# Patient Record
Sex: MALE | Race: WHITE | NOT HISPANIC OR LATINO | Employment: OTHER | URBAN - METROPOLITAN AREA
[De-identification: names, ages, dates, MRNs, and addresses within clinical notes are randomized per-mention and may not be internally consistent; named-entity substitution may affect disease eponyms.]

---

## 2017-05-17 RX ORDER — ESCITALOPRAM OXALATE 10 MG/1
10 TABLET ORAL DAILY
Qty: 30 TABLET | Refills: 11 | OUTPATIENT
Start: 2017-05-17 | End: 2018-05-17

## 2017-05-31 ENCOUNTER — OFFICE VISIT (OUTPATIENT)
Dept: FAMILY MEDICINE | Facility: CLINIC | Age: 82
End: 2017-05-31
Payer: MEDICARE

## 2017-05-31 VITALS
WEIGHT: 182.5 LBS | DIASTOLIC BLOOD PRESSURE: 60 MMHG | SYSTOLIC BLOOD PRESSURE: 148 MMHG | HEART RATE: 68 BPM | BODY MASS INDEX: 27.03 KG/M2 | TEMPERATURE: 98 F | RESPIRATION RATE: 16 BRPM | HEIGHT: 69 IN
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
## 2017-05-31 DIAGNOSIS — E11.42 TYPE 2 DIABETES MELLITUS WITH DIABETIC POLYNEUROPATHY, WITHOUT LONG-TERM CURRENT USE OF INSULIN: ICD-10-CM

## 2017-05-31 DIAGNOSIS — E03.9 HYPOTHYROIDISM, UNSPECIFIED TYPE: ICD-10-CM

## 2017-05-31 DIAGNOSIS — L30.9 ECZEMA OF BOTH UPPER EXTREMITIES: ICD-10-CM

## 2017-05-31 DIAGNOSIS — M47.817 LUMBAR AND SACRAL ARTHRITIS: ICD-10-CM

## 2017-05-31 DIAGNOSIS — R97.20 ELEVATED PSA: ICD-10-CM

## 2017-05-31 DIAGNOSIS — T63.481A INSECT BITES AND STINGS, ACCIDENTAL OR UNINTENTIONAL, INITIAL ENCOUNTER: Primary | ICD-10-CM

## 2017-05-31 PROCEDURE — 99202 OFFICE O/P NEW SF 15 MIN: CPT | Mod: ,,, | Performed by: FAMILY MEDICINE

## 2017-05-31 RX ORDER — MEMANTINE HYDROCHLORIDE 28 MG/1
CAPSULE, EXTENDED RELEASE ORAL
COMMUNITY
Start: 2017-05-08 | End: 2017-08-02 | Stop reason: SDUPTHER

## 2017-05-31 RX ORDER — LEVOTHYROXINE SODIUM 125 UG/1
125 TABLET ORAL DAILY
Qty: 30 TABLET | Refills: 11 | Status: ON HOLD | OUTPATIENT
Start: 2017-05-31 | End: 2017-08-23 | Stop reason: HOSPADM

## 2017-05-31 RX ORDER — SIMVASTATIN 40 MG/1
TABLET, FILM COATED ORAL
COMMUNITY
Start: 2017-04-21 | End: 2017-05-31 | Stop reason: SDUPTHER

## 2017-05-31 RX ORDER — ENALAPRIL MALEATE 2.5 MG/1
TABLET ORAL
Status: ON HOLD | COMMUNITY
Start: 2017-05-16 | End: 2017-08-23 | Stop reason: HOSPADM

## 2017-05-31 RX ORDER — ESCITALOPRAM OXALATE 10 MG/1
TABLET ORAL
Status: ON HOLD | COMMUNITY
Start: 2017-05-18 | End: 2017-08-23 | Stop reason: HOSPADM

## 2017-05-31 RX ORDER — SIMVASTATIN 20 MG/1
TABLET, FILM COATED ORAL
Status: ON HOLD | COMMUNITY
Start: 2017-03-18 | End: 2017-08-23 | Stop reason: HOSPADM

## 2017-05-31 RX ORDER — LEVOTHYROXINE SODIUM 150 UG/1
TABLET ORAL
COMMUNITY
Start: 2017-05-16 | End: 2017-05-31 | Stop reason: DRUGHIGH

## 2017-05-31 RX ORDER — HYDROXYZINE HYDROCHLORIDE 25 MG/1
25 TABLET, FILM COATED ORAL NIGHTLY PRN
Qty: 30 TABLET | Refills: 1 | Status: ON HOLD | OUTPATIENT
Start: 2017-05-31 | End: 2017-08-23 | Stop reason: HOSPADM

## 2017-05-31 RX ORDER — MOMETASONE FUROATE 1 MG/G
OINTMENT TOPICAL DAILY
Qty: 60 G | Refills: 2 | Status: SHIPPED | OUTPATIENT
Start: 2017-05-31 | End: 2017-07-03 | Stop reason: SDUPTHER

## 2017-05-31 RX ORDER — GLIPIZIDE 5 MG/1
TABLET, FILM COATED, EXTENDED RELEASE ORAL
Status: ON HOLD | COMMUNITY
Start: 2017-04-04 | End: 2017-08-23 | Stop reason: HOSPADM

## 2017-05-31 NOTE — PROGRESS NOTES
Subjective:       Patient ID: Saman Crockett is a 88 y.o. male.    Chief Complaint: Sores (elbow and feet) and Follow-up (medication)    Sores   This is a new problem. The current episode started in the past 7 days. The problem occurs intermittently (one month). Associated symptoms include numbness. Pertinent negatives include no joint swelling or rash. Associated symptoms comments: Diabetic neuropathy. Nothing aggravates the symptoms. He has tried nothing for the symptoms. The treatment provided mild relief.   Elbow Injury   This is a chronic problem. The current episode started more than 1 year ago. The problem occurs 2 to 4 times per day. The problem has been gradually worsening. Associated symptoms include numbness. Pertinent negatives include no joint swelling or rash. Exacerbated by: itching scrathing. He has tried nothing for the symptoms. The treatment provided mild relief.     Review of Systems   Musculoskeletal: Negative for joint swelling.   Skin: Negative for rash.   Neurological: Positive for numbness.       Objective:      Physical Exam   Musculoskeletal:        Back:         Feet:    Skin: Capillary refill takes less than 2 seconds. Rash (as illustrated) noted.            Assessment:       1. Insect bites and stings, accidental or unintentional, initial encounter    2. Hypothyroidism, unspecified type    3. Type 2 diabetes mellitus with diabetic polyneuropathy, without long-term current use of insulin    4. Eczema of both upper extremities    5. Lumbar and sacral arthritis    6. Elevated PSA        Plan:       Saman was seen today for sores and follow-up.    Diagnoses and all orders for this visit:    Insect bites and stings, accidental or unintentional, initial encounter  -     hydrOXYzine HCl (ATARAX) 25 MG tablet; Take 1 tablet (25 mg total) by mouth nightly as needed for Itching.  -     mometasone (ELOCON) 0.1 % ointment; Apply topically once daily.    Hypothyroidism, unspecified type  -      levothyroxine (SYNTHROID) 125 MCG tablet; Take 1 tablet (125 mcg total) by mouth once daily.    Type 2 diabetes mellitus with diabetic polyneuropathy, without long-term current use of insulin    Eczema of both upper extremities  -     hydrOXYzine HCl (ATARAX) 25 MG tablet; Take 1 tablet (25 mg total) by mouth nightly as needed for Itching.  -     mometasone (ELOCON) 0.1 % ointment; Apply topically once daily.    Lumbar and sacral arthritis  -     X-Ray Lumbar Spine Ap And Lateral; Future    Elevated PSA         Return in about 6 months (around 11/30/2017).

## 2017-06-06 DIAGNOSIS — I71.40 ABDOMINAL AORTIC ANEURYSM (AAA) WITHOUT RUPTURE: Primary | ICD-10-CM

## 2017-06-07 ENCOUNTER — TELEPHONE (OUTPATIENT)
Dept: FAMILY MEDICINE | Facility: CLINIC | Age: 82
End: 2017-06-07

## 2017-06-07 NOTE — TELEPHONE ENCOUNTER
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  Rx: CTa of abd as per radiology recs.

## 2017-06-08 ENCOUNTER — TELEPHONE (OUTPATIENT)
Dept: FAMILY MEDICINE | Facility: CLINIC | Age: 82
End: 2017-06-08

## 2017-06-08 NOTE — TELEPHONE ENCOUNTER
----- Message from Rajan Munson MD sent at 6/8/2017  1:11 PM CDT -----  CT reviewed, AAA stable. Please notify pt.
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
## 2017-07-03 DIAGNOSIS — L30.9 ECZEMA OF BOTH UPPER EXTREMITIES: ICD-10-CM

## 2017-07-03 DIAGNOSIS — T63.481A INSECT BITES AND STINGS, ACCIDENTAL OR UNINTENTIONAL, INITIAL ENCOUNTER: ICD-10-CM

## 2017-07-03 RX ORDER — MOMETASONE FUROATE 1 MG/G
OINTMENT TOPICAL DAILY
Qty: 60 G | Refills: 2 | Status: ON HOLD | OUTPATIENT
Start: 2017-07-03 | End: 2017-08-23 | Stop reason: HOSPADM

## 2017-08-02 DIAGNOSIS — G30.1 LATE ONSET ALZHEIMER'S DISEASE WITHOUT BEHAVIORAL DISTURBANCE: Primary | ICD-10-CM

## 2017-08-02 DIAGNOSIS — F02.80 LATE ONSET ALZHEIMER'S DISEASE WITHOUT BEHAVIORAL DISTURBANCE: Primary | ICD-10-CM

## 2017-08-02 RX ORDER — MEMANTINE HYDROCHLORIDE 28 MG/1
1 CAPSULE, EXTENDED RELEASE ORAL DAILY
Qty: 30 CAPSULE | Refills: 11 | Status: ON HOLD | OUTPATIENT
Start: 2017-08-02 | End: 2017-08-23 | Stop reason: HOSPADM

## 2017-08-07 ENCOUNTER — HISTORICAL (OUTPATIENT)
Dept: ADMINISTRATIVE | Facility: HOSPITAL | Age: 82
End: 2017-08-07

## 2017-08-07 ENCOUNTER — TELEPHONE (OUTPATIENT)
Dept: FAMILY MEDICINE | Facility: CLINIC | Age: 82
End: 2017-08-07

## 2017-08-07 LAB
HCT VFR BLD AUTO: 30.3 % (ref 39–55)
HGB BLD-MCNC: 10.2 G/DL (ref 14–16)

## 2017-08-09 ENCOUNTER — TELEPHONE (OUTPATIENT)
Dept: UROLOGY | Facility: CLINIC | Age: 82
End: 2017-08-09

## 2017-08-09 ENCOUNTER — HOSPITAL ENCOUNTER (INPATIENT)
Facility: HOSPITAL | Age: 82
LOS: 14 days | Discharge: HOME HEALTH | DRG: 560 | End: 2017-08-23
Attending: PHYSICAL MEDICINE & REHABILITATION | Admitting: PHYSICAL MEDICINE & REHABILITATION
Payer: MEDICARE

## 2017-08-09 DIAGNOSIS — N28.9 RENAL INSUFFICIENCY: Primary | ICD-10-CM

## 2017-08-09 DIAGNOSIS — Z96.641 S/P TOTAL HIP ARTHROPLASTY, RIGHT: ICD-10-CM

## 2017-08-09 PROBLEM — Z96.649 S/P TOTAL HIP ARTHROPLASTY: Status: ACTIVE | Noted: 2017-08-09

## 2017-08-09 LAB
HCT VFR BLD AUTO: 25.3 % (ref 39–55)
HGB BLD-MCNC: 8.5 G/DL (ref 14–16)
MCH RBC QN AUTO: 31 PG (ref 25–35)
MCHC RBC AUTO-ENTMCNC: 33.6 G/DL (ref 31–36)
MCV RBC AUTO: 92.3 FL (ref 80–100)
NUCLEATED RBCS: 0 %
PLATELET # BLD AUTO: 215 K/UL (ref 140–440)
RBC # BLD AUTO: 2.74 M/UL (ref 4.3–5.9)
WBC # BLD AUTO: 14 K/UL (ref 5–10)

## 2017-08-09 PROCEDURE — 25000003 PHARM REV CODE 250: Performed by: PHYSICAL MEDICINE & REHABILITATION

## 2017-08-09 PROCEDURE — 12800000 HC REHAB SEMI-PRIVATE ROOM

## 2017-08-09 PROCEDURE — 63600175 PHARM REV CODE 636 W HCPCS: Performed by: PHYSICAL MEDICINE & REHABILITATION

## 2017-08-09 RX ORDER — METFORMIN HYDROCHLORIDE 750 MG/1
750 TABLET, EXTENDED RELEASE ORAL 2 TIMES DAILY WITH MEALS
Status: DISCONTINUED | OUTPATIENT
Start: 2017-08-10 | End: 2017-08-23 | Stop reason: HOSPADM

## 2017-08-09 RX ORDER — GLIPIZIDE 5 MG/1
5 TABLET, FILM COATED, EXTENDED RELEASE ORAL
Status: DISCONTINUED | OUTPATIENT
Start: 2017-08-10 | End: 2017-08-23 | Stop reason: HOSPADM

## 2017-08-09 RX ORDER — GEMFIBROZIL 600 MG/1
600 TABLET, FILM COATED ORAL
Status: DISCONTINUED | OUTPATIENT
Start: 2017-08-10 | End: 2017-08-23 | Stop reason: HOSPADM

## 2017-08-09 RX ORDER — MAG HYDROX/ALUMINUM HYD/SIMETH 200-200-20
30 SUSPENSION, ORAL (FINAL DOSE FORM) ORAL 4 TIMES DAILY PRN
Status: DISCONTINUED | OUTPATIENT
Start: 2017-08-09 | End: 2017-08-23 | Stop reason: HOSPADM

## 2017-08-09 RX ORDER — HYDROCODONE BITARTRATE AND ACETAMINOPHEN 5; 325 MG/1; MG/1
2 TABLET ORAL EVERY 4 HOURS PRN
Status: DISCONTINUED | OUTPATIENT
Start: 2017-08-09 | End: 2017-08-23 | Stop reason: HOSPADM

## 2017-08-09 RX ORDER — TAMSULOSIN HYDROCHLORIDE 0.4 MG/1
0.4 CAPSULE ORAL DAILY
Status: DISCONTINUED | OUTPATIENT
Start: 2017-08-10 | End: 2017-08-23 | Stop reason: HOSPADM

## 2017-08-09 RX ORDER — MEMANTINE HYDROCHLORIDE 5 MG/1
5 TABLET ORAL 2 TIMES DAILY
Status: DISCONTINUED | OUTPATIENT
Start: 2017-08-09 | End: 2017-08-10

## 2017-08-09 RX ORDER — ENOXAPARIN SODIUM 100 MG/ML
30 INJECTION SUBCUTANEOUS EVERY 24 HOURS
Status: DISCONTINUED | OUTPATIENT
Start: 2017-08-09 | End: 2017-08-10

## 2017-08-09 RX ORDER — LEVOTHYROXINE SODIUM 150 UG/1
150 TABLET ORAL
Status: DISCONTINUED | OUTPATIENT
Start: 2017-08-10 | End: 2017-08-23 | Stop reason: HOSPADM

## 2017-08-09 RX ORDER — ESCITALOPRAM OXALATE 10 MG/1
10 TABLET ORAL DAILY
Status: DISCONTINUED | OUTPATIENT
Start: 2017-08-10 | End: 2017-08-23 | Stop reason: HOSPADM

## 2017-08-09 RX ORDER — ASPIRIN 325 MG
325 TABLET ORAL 2 TIMES DAILY
Status: DISCONTINUED | OUTPATIENT
Start: 2017-08-09 | End: 2017-08-23 | Stop reason: HOSPADM

## 2017-08-09 RX ORDER — ACETAMINOPHEN 325 MG/1
650 TABLET ORAL EVERY 4 HOURS PRN
Status: DISCONTINUED | OUTPATIENT
Start: 2017-08-09 | End: 2017-08-23 | Stop reason: HOSPADM

## 2017-08-09 RX ORDER — BISACODYL 5 MG
10 TABLET, DELAYED RELEASE (ENTERIC COATED) ORAL DAILY PRN
Status: DISCONTINUED | OUTPATIENT
Start: 2017-08-09 | End: 2017-08-23 | Stop reason: HOSPADM

## 2017-08-09 RX ORDER — SYRING-NEEDL,DISP,INSUL,0.3 ML 29 G X1/2"
296 SYRINGE, EMPTY DISPOSABLE MISCELLANEOUS DAILY PRN
Status: DISCONTINUED | OUTPATIENT
Start: 2017-08-09 | End: 2017-08-23 | Stop reason: HOSPADM

## 2017-08-09 RX ORDER — FERROUS GLUCONATE 324(38)MG
324 TABLET ORAL
Status: DISCONTINUED | OUTPATIENT
Start: 2017-08-10 | End: 2017-08-23 | Stop reason: HOSPADM

## 2017-08-09 RX ORDER — ENALAPRIL MALEATE 2.5 MG/1
2.5 TABLET ORAL DAILY
Status: DISCONTINUED | OUTPATIENT
Start: 2017-08-10 | End: 2017-08-23 | Stop reason: HOSPADM

## 2017-08-09 RX ADMIN — INSULIN DETEMIR 30 UNITS: 100 INJECTION, SOLUTION SUBCUTANEOUS at 10:08

## 2017-08-09 RX ADMIN — ASPIRIN 325 MG ORAL TABLET 325 MG: 325 PILL ORAL at 10:08

## 2017-08-09 RX ADMIN — ENOXAPARIN SODIUM 30 MG: 100 INJECTION SUBCUTANEOUS at 10:08

## 2017-08-09 RX ADMIN — MEMANTINE HYDROCHLORIDE 5 MG: 5 TABLET ORAL at 10:08

## 2017-08-09 NOTE — TELEPHONE ENCOUNTER
This [facility] note was written into [facility] for convenience and making future follow-up easier. It was copied over to [facility] notes.       [facility]    I was consulted on pt at [facility] for urinary retention after hip surgery and some resulting hematuria this morning but he was discharged prior to seeing.     He has a known hx of bph and sees  for this. He has been on flomax at least since 2013.    They removed his elena yesterday and he was unable to void. In and out cath was 325. He was then given opportunity to void but could not and elena was placed. Nurse did not document how much came out.     Would not try another voiding trial until pt is ambulating and having regular bowel movements. Would try again in another week. He can get cipro 500 bid x 3days since he had elena manipulation x 3 but would not recommend daily abx.continue flomax.    If he cannot void after next [facility]ing [facility] and we can set up outpt f/u with .

## 2017-08-09 NOTE — TELEPHONE ENCOUNTER
----- Message from Drea Yuen sent at 8/9/2017  3:13 PM CDT -----  Contact: [facility]-  Saranya- [phone]  The hospital has a consult for the doctor for above listed patient.Thanks!

## 2017-08-09 NOTE — TELEPHONE ENCOUNTER
"Called Saranya back and message sent to Dr. Gray to consult as family concerned that there is a "pinkish" color to urine since catheter placed last night and being dc'd to inpatient rehab today  "

## 2017-08-10 LAB
ALBUMIN SERPL BCP-MCNC: 2.2 G/DL
ALP SERPL-CCNC: 54 U/L
ALT SERPL W/O P-5'-P-CCNC: 5 U/L
AMORPH CRY URNS QL MICRO: ABNORMAL
ANION GAP SERPL CALC-SCNC: 9 MMOL/L
AST SERPL-CCNC: 36 U/L
BACTERIA #/AREA URNS HPF: ABNORMAL /HPF
BASOPHILS # BLD AUTO: 0 K/UL
BASOPHILS NFR BLD: 0.3 %
BILIRUB SERPL-MCNC: 0.6 MG/DL
BILIRUB UR QL STRIP: NEGATIVE
BUN SERPL-MCNC: 26 MG/DL
CALCIUM SERPL-MCNC: 8.1 MG/DL
CHLORIDE SERPL-SCNC: 106 MMOL/L
CLARITY UR: CLEAR
CO2 SERPL-SCNC: 22 MMOL/L
COLOR UR: YELLOW
CREAT SERPL-MCNC: 1.4 MG/DL
DIFFERENTIAL METHOD: ABNORMAL
EOSINOPHIL # BLD AUTO: 0.4 K/UL
EOSINOPHIL NFR BLD: 3.5 %
ERYTHROCYTE [DISTWIDTH] IN BLOOD BY AUTOMATED COUNT: 14.3 %
EST. GFR  (AFRICAN AMERICAN): 51 ML/MIN/1.73 M^2
EST. GFR  (NON AFRICAN AMERICAN): 45 ML/MIN/1.73 M^2
GLUCOSE SERPL-MCNC: 105 MG/DL
GLUCOSE UR QL STRIP: NEGATIVE
GRAN CASTS #/AREA URNS LPF: 4 /LPF
HCT VFR BLD AUTO: 22.2 %
HGB BLD-MCNC: 7.7 G/DL
HGB UR QL STRIP: ABNORMAL
HYALINE CASTS #/AREA URNS LPF: 0 /LPF
KETONES UR QL STRIP: NEGATIVE
LEUKOCYTE ESTERASE UR QL STRIP: ABNORMAL
LYMPHOCYTES # BLD AUTO: 4.1 K/UL
LYMPHOCYTES NFR BLD: 36.8 %
MCH RBC QN AUTO: 31.1 PG
MCHC RBC AUTO-ENTMCNC: 34.5 G/DL
MCV RBC AUTO: 90 FL
MICROSCOPIC COMMENT: ABNORMAL
MONOCYTES # BLD AUTO: 0.7 K/UL
MONOCYTES NFR BLD: 6 %
NEUTROPHILS # BLD AUTO: 6 K/UL
NEUTROPHILS NFR BLD: 53.4 %
NITRITE UR QL STRIP: NEGATIVE
PH UR STRIP: 6 [PH] (ref 5–8)
PLATELET # BLD AUTO: 223 K/UL
PMV BLD AUTO: 7.1 FL
POCT GLUCOSE: 115 MG/DL (ref 70–110)
POCT GLUCOSE: 135 MG/DL (ref 70–110)
POCT GLUCOSE: 183 MG/DL (ref 70–110)
POCT GLUCOSE: 228 MG/DL (ref 70–110)
POCT GLUCOSE: 301 MG/DL (ref 70–110)
POTASSIUM SERPL-SCNC: 3.8 MMOL/L
PROT SERPL-MCNC: 5.5 G/DL
PROT UR QL STRIP: ABNORMAL
RBC # BLD AUTO: 2.47 M/UL
RBC #/AREA URNS HPF: 20 /HPF (ref 0–4)
SODIUM SERPL-SCNC: 137 MMOL/L
SP GR UR STRIP: 1.01 (ref 1–1.03)
SQUAMOUS #/AREA URNS HPF: 1 /HPF
URN SPEC COLLECT METH UR: ABNORMAL
UROBILINOGEN UR STRIP-ACNC: NEGATIVE EU/DL
WBC # BLD AUTO: 11.2 K/UL
WBC #/AREA URNS HPF: 3 /HPF (ref 0–5)

## 2017-08-10 PROCEDURE — 92507 TX SP LANG VOICE COMM INDIV: CPT

## 2017-08-10 PROCEDURE — 97161 PT EVAL LOW COMPLEX 20 MIN: CPT

## 2017-08-10 PROCEDURE — 12800000 HC REHAB SEMI-PRIVATE ROOM

## 2017-08-10 PROCEDURE — 81000 URINALYSIS NONAUTO W/SCOPE: CPT

## 2017-08-10 PROCEDURE — 25000003 PHARM REV CODE 250: Performed by: PHYSICAL MEDICINE & REHABILITATION

## 2017-08-10 PROCEDURE — 87086 URINE CULTURE/COLONY COUNT: CPT

## 2017-08-10 PROCEDURE — 27000221 HC OXYGEN, UP TO 24 HOURS

## 2017-08-10 PROCEDURE — 97165 OT EVAL LOW COMPLEX 30 MIN: CPT

## 2017-08-10 PROCEDURE — 85025 COMPLETE CBC W/AUTO DIFF WBC: CPT

## 2017-08-10 PROCEDURE — 36415 COLL VENOUS BLD VENIPUNCTURE: CPT

## 2017-08-10 PROCEDURE — 80053 COMPREHEN METABOLIC PANEL: CPT

## 2017-08-10 PROCEDURE — 97530 THERAPEUTIC ACTIVITIES: CPT

## 2017-08-10 PROCEDURE — 92523 SPEECH SOUND LANG COMPREHEN: CPT

## 2017-08-10 PROCEDURE — 63600175 PHARM REV CODE 636 W HCPCS: Performed by: PHYSICAL MEDICINE & REHABILITATION

## 2017-08-10 PROCEDURE — 94761 N-INVAS EAR/PLS OXIMETRY MLT: CPT

## 2017-08-10 RX ORDER — MEMANTINE HYDROCHLORIDE 28 MG/1
28 CAPSULE, EXTENDED RELEASE ORAL DAILY
Status: DISCONTINUED | OUTPATIENT
Start: 2017-08-10 | End: 2017-08-23 | Stop reason: HOSPADM

## 2017-08-10 RX ORDER — ENOXAPARIN SODIUM 100 MG/ML
40 INJECTION SUBCUTANEOUS EVERY 24 HOURS
Status: DISCONTINUED | OUTPATIENT
Start: 2017-08-10 | End: 2017-08-23 | Stop reason: HOSPADM

## 2017-08-10 RX ORDER — MEMANTINE HYDROCHLORIDE 5 MG/1
10 TABLET ORAL 2 TIMES DAILY
Status: DISCONTINUED | OUTPATIENT
Start: 2017-08-10 | End: 2017-08-10

## 2017-08-10 RX ADMIN — ENOXAPARIN SODIUM 40 MG: 100 INJECTION SUBCUTANEOUS at 04:08

## 2017-08-10 RX ADMIN — GEMFIBROZIL 600 MG: 600 TABLET ORAL at 04:08

## 2017-08-10 RX ADMIN — INSULIN DETEMIR 30 UNITS: 100 INJECTION, SOLUTION SUBCUTANEOUS at 09:08

## 2017-08-10 RX ADMIN — METFORMIN HYDROCHLORIDE 750 MG: 750 TABLET, EXTENDED RELEASE ORAL at 05:08

## 2017-08-10 RX ADMIN — TAMSULOSIN HYDROCHLORIDE 0.4 MG: 0.4 CAPSULE ORAL at 08:08

## 2017-08-10 RX ADMIN — GEMFIBROZIL 600 MG: 600 TABLET ORAL at 06:08

## 2017-08-10 RX ADMIN — LEVOTHYROXINE SODIUM 150 MCG: 150 TABLET ORAL at 06:08

## 2017-08-10 RX ADMIN — MEMANTINE HYDROCHLORIDE 28 MG: 28 CAPSULE, EXTENDED RELEASE ORAL at 08:08

## 2017-08-10 RX ADMIN — THERA TABS 1 TABLET: TAB at 08:08

## 2017-08-10 RX ADMIN — ESCITALOPRAM 10 MG: 10 TABLET, FILM COATED ORAL at 08:08

## 2017-08-10 RX ADMIN — ASPIRIN 325 MG ORAL TABLET 325 MG: 325 PILL ORAL at 08:08

## 2017-08-10 RX ADMIN — ASPIRIN 325 MG ORAL TABLET 325 MG: 325 PILL ORAL at 09:08

## 2017-08-10 RX ADMIN — FERROUS GLUCONATE 324 MG: 324 TABLET ORAL at 08:08

## 2017-08-10 NOTE — PLAN OF CARE
Problem: Fall Risk (Adult)  Goal: Absence of Falls  Patient will demonstrate the desired outcomes by discharge/transition of care.   Outcome: Ongoing (interventions implemented as appropriate)  Instructed to call, not fall. No incident of injury or fall currently

## 2017-08-10 NOTE — PROGRESS NOTES
Spoke with patient at bedside to complete assessment.  Patient is  and lives with his wife in [place].  They have 4 children who all live in the area.  Patient has no DME and does not have HH following .  Patient uses [facility] on [address] [facility].  Patient's wife fell last night and hit her head and in now hospitalized at [facility].  SW will follow and assist with discharge planning as needed.

## 2017-08-10 NOTE — PLAN OF CARE
08/10/17 1457   PRE-TX-O2-ETCO2   O2 Device (Oxygen Therapy) nasal cannula   $ Is the patient on Oxygen? Yes   Flow (L/min) 3   Oxygen Concentration (%) 32   SpO2 (!) 93 %   Pulse Oximetry Type Intermittent   $ Pulse Oximetry - Multiple Charge Pulse Oximetry - Multiple   Ready to Wean/Extubation Screen   FIO2<=50 (chart decimal) 0.32

## 2017-08-10 NOTE — H&P
DATE OF ADMISSION:  08/09/2017    ADMITTING PHYSICIAN:  Laura Gutierrez M.D.    REFERRING FACILITY:  [facility].    CHIEF COMPLAINT:  Unable to walk.    HISTORY OF PRESENT ILLNESS:  Mr. Crockett is an 88-year-old gentleman with   experience of fall and subsequent admission to [facility] on 08/05/2017.  The patient's   workup included imaging studies as well as evaluation by the orthopedic   surgeon.  The patient with diagnosis of completely displaced femoral neck   fracture, mid portion.  The patient is status post total hip arthroplasty, right   hip, anterior approach.  Procedure was performed on 08/07/2017 and surgeon was   Dr. Ko.  As the patient's medical condition was stabilized, I had the   opportunity to review the patient's medical record and recommended intensive   inhouse rehabilitation prior to considering discharge to home.    PAST MEDICAL HISTORY:  Significant for chronic kidney disease stage III, anemia,   hypertension, insulin-dependent type 2 diabetes, hypothyroidism, coronary   artery disease.    PAST SURGICAL HISTORY:  As above, coronary artery bypass graft.    ALLERGIES:  LEVAQUIN.    MEDICATIONS:  Aspirin 325 mg 1 p.o. b.i.d., enalapril 2.5 mg 1 p.o. daily,   Lovenox 30 mg subcutaneously daily, escitalopram 10 mg p.o. daily, ferrous   gluconate 325 mg 1 p.o. daily with breakfast, gemfibrozil 600 mg 1 p.o. b.i.d.,   glipizide 5 mg 1 p.o. with breakfast, insulin 30 units subQ at bedtime,   levothyroxine 150 mcg 1 p.o. before breakfast, memantine 28 mg 1 p.o. daily,   metformin 750 mg 1 p.o. b.i.d., multivitamin 1 tab p.o. daily, tamsulosin 0.4 mg   1 p.o. daily and hydrocodone 1 p.o. b.i.d.    LABORATORIES:  On 08/10/2017, Accu-Chek is 115.  On 08/10/2017, CBC:  White   blood cells 11.2, hemoglobin 7.7, hematocrit 22.2 and platelets 223.  On   08/10/2017, CMP includes sodium of 137, potassium 3.8, chloride 106, CO2 of 22,   glucose 105, BUN 26, creatinine 1.4, calcium 8.1, albumin  2.2, alkaline   phosphatase 54, AST 36, ALT 5.    PHYSICAL EXAMINATION:  VITAL SIGNS:  Temperature is 99, pulse is 75, respirations 20, systolic blood   pressure is 115, diastolic blood pressure 54, and pulse ox is 92%.  GENERAL:  Calm, cooperative, in no acute distress.  HEENT:  Normocephalic, atraumatic.  Extraocular muscles are intact.  Sclerae are   nonicteric.  NECK:  Supple.  Throat is clear.  LUNGS:  Clear to auscultation bilateral.  HEART:  Regular rate and rhythm.  No murmur or gallops noted.  ABDOMEN:  Soft, nontender, nondistended, positive bowel sounds.  EXTREMITIES:  No clubbing, cyanosis or edema is noted.  MANUAL MUSCLE STRENGTH:  Bilateral upper extremities are 4+/5, left lower   extremity is also 4/5, right lower extremity ankle dorsiflexion and plantar   flexion is 4/5, knee flexion and extension is 3-/5.  Straight leg raising is   1/5.  GAIT:  Deferred.  GENITAL:  Deferred.  RECTAL:  Deferred.  SKIN:  Surgical incision is dry and intact, secured with staples.    ASSESSMENT:  1.  Status post right total hip arthroplasty.  2.  History of hypertension.  3.  History of diabetes.  4.  Prominent anemia.  5.  History of coronary artery disease.    PLAN:  PT, OT eval, treat as indicated.  Social Service to arrange for   post-discharge planning.  Rehab to nursing with emphasis on bowel and bladder   management, skin care and fall precautions.    PROGNOSIS:  Good.    ESTIMATED LENGTH OF STAY:  Approximately 2 weeks.  Additionally, we will monitor   CBC.  Check stool for blood and homemade transfuse as indicated.      DEANNA  dd: 08/10/2017 09:11:51 (CDT)  td: 08/10/2017 12:36:39 (CDT)  Doc ID   #[id number]  Job ID #[id number]    CC:

## 2017-08-10 NOTE — PLAN OF CARE
Problem: Fall Risk (Adult)  Goal: Identify Related Risk Factors and Signs and Symptoms  Related risk factors and signs and symptoms are identified upon initiation of Human Response Clinical Practice Guideline (CPG)   Outcome: Ongoing (interventions implemented as appropriate)  Pt alert with much forgetfulness. Cont of bowel. White cath patent draining yellow urine. Mod assist with transfers. Bed/chair alarm on at all times.

## 2017-08-10 NOTE — PLAN OF CARE
Problem: Physical Therapy Goal  Goal: Physical Therapy Goal  Goals to be met by: 2017     Patient will increase functional independence with mobility by performin. Supine to sit with Stand-by Assistance  2. Sit to supine with Stand-by Assistance  3. Sit to stand transfer with Stand-by Assistance  4. Bed to chair transfer with Stand-by Assistance using Rolling Walker  5. Gait  x 200 feet with Stand-by Assistance using Rolling Walker.   6. Wheelchair propulsion x 200 feet with Modified [place] using bilateral uppper extremities  7. Ascend/descend at least 4 stair with left Handrail Contact Guard Assistance.    Outcome: Ongoing (interventions implemented as appropriate)    PT evaluation completed and POC initiated.

## 2017-08-10 NOTE — PT/OT/SLP EVAL
"Occupational Therapy  Evaluation    Saman Crockett   MRN: [id number]   Admitting Diagnosis: R femur fx with displacement s/p anterior hip arthroplasty     OT Date of Treatment: 08/10/17   OT Start Time: 0800  OT Stop Time: 0930  OT Total Time (min): 90 min    Billable Minutes: 90    Evaluation 60 Therapeutic activity 30  Total Minutes: 90    Diagnosis: R femur fx with displacement s/p anterior hip arthroplasty     Past Medical History:   Diagnosis Date    Diabetes mellitus     Elevated PSA     Hyperlipidemia     Kidney stone     Thyroid disease       Past Surgical History:   Procedure Laterality Date    LITHOTRIPSY      tonisilectomy         Referring physician: Brenda  Date referred to OT: 8/10/17    General Precautions: Standard, fall  Orthopedic Precautions: RLE weight bearing as tolerated, RLE anterior precautions  Braces: N/A    Do you have any cultural, spiritual, Holiness conflicts, given your current situation?: None     Patient History:  Living Environment  Lives With: spouse  Living Arrangements: house  Home Accessibility: stairs to enter home  Home Layout: Able to live on 1st floor  Number of Stairs to Enter Home: 2  Stair Railings at Home: outside, present on left side  Transportation Available: car, family or friend will provide  Equipment Currently Used at Home: none    Prior level of function:   Bed Mobility/Transfers: independent  Grooming: independent  Bathing: independent  Upper Body Dressing: independent  Lower Body Dressing: independent  Toileting: independent  Home Management Skills:  (Shares with spouse)  Homemaking Responsibilities:  (Shares with spouse)  Driving License: Yes  Mode of Transportation: Car     Dominant hand: right    Subjective:  Communicated with nurse prior to session.    Chief Complaint: Pain of R LE   Patient/Family stated goals: "This is really miserable. And I am worried about my wife. She fell yesterday and needed stitches."    Pain/Comfort  Pain Rating 1:  " "(Unrated pain )  Pain Addressed 1: Distraction, Nurse notified (Nurse provided pain medication midway through evaluation)  Pain Rating Post-Intervention 1: 0/10 (no pain reported)    Objective:  Patient found with: bed alarm, elena catheter, oxygen, peripheral IV (Pt found without NC inserted in nose - 2 L/min; (R) hip incision dressing)    Cognitive Exam:  Oriented to: Person and pt somewhat aware of situation - during bed mobility pt felt pain and said, "Oh, I must have broken my hip or something."  Follows Commands/attention: Follows one-step commands  Communication: clear/fluent  Memory:  Impaired - see ST baker for details  Safety awareness/insight to disability: impaired  Coping skills/emotional control: Appropriate to situation    Visual/perceptual:  Intact to perform self care tasks     Physical Exam:  Postural examination/scapula alignment: Rounded shoulder and Head forward  Skin integrity: Bruising of (L) forearm     Sensation:   Bilateral UE light touch intact     Upper Extremity Range of Motion:  Right Upper Extremity: WFL  Left Upper Extremity: WFL    Upper Extremity Strength:  Right Upper Extremity: 4/5  Left Upper Extremity: 4/5   Strength: 4/5    Fine motor coordination:   Intact    Gross motor coordination: WFL    Functional Mobility:  Bed Mobility:   Supine to sit: Moderate Assistance   Sit to supine: Moderate Assistance    Transfers:   Sit to stand:Minimal Assistance with Grab bars verbal instruction for correct hand placement/proper technique  Bed <> Chair:  Stand Pivot with Moderate Assistance with No Assistive Device verbal instruction for correct transfer technique  Toilet Transfer:  Pt Stand Pivot with Minimal Assistance with Grab bars verbal instruction for correct transfer technique with proper hand placement    Feeding:  Set-up of food tray - verbal instruction required for opening milk container - pt able to open with Supervision    Grooming:  Set-up of items sitting sink " side    Bathing:  To be assessed     UE Dressing:  Supervision to don/doff t-shirt from wheelchair level    LE Dressing:  To be assessed     Toileting:  Pt performed toileting with Moderate Assistance with grab bar and bedside commode     Balance:   Static Sit: Supervision   Dynamic Sit: Stand By Assistance   Static Stand: Min (A)/CGA with UE support of DME  Dynamic stand: Min (A) with UE support of DME    Additional Treatment:  - OTR providing education regarding OT role/POC, inpatient rehab purpose, therapy schedule, correct transfer techniques with use of DME, adaptive techniques to increase independence and safety with self care tasks, pain management  - Patient oriented to place, situation, and inpatient rehab therapy (3 hr of therapy/day etc) multiple times throughout evaluation; OTR assisting patient to call daughter 3 times - instructed on use of phone - no answer, OTR instructing patient on use of call button, seatbelt and bed alarms, etc - pt will benefit from daily reinforcement of all education     Patient left HOB elevated with all lines intact, call button in reach, bed alarm on and nurse present    Assessment:  Saman Crockett is a 88 y.o. male with a medical diagnosis of R femur fx with displacement s/p anterior hip arthroplasty and presents with performance deficits of physical skills including impaired balance, mobility, strength, dexterity, fine motor coordination, gross motor coordination and endurance.  These performance deficits have resulted in activity limitations including, but not limited to: bed mobility, transfers, ambulating short distances, navigating around obstacles during ambulation, transitional movement patterns ( kneeling, bending, reaching), upper body dressing, lower body dressing, grooming, toileting, bathing and carrying objects.   Pt's role as adult living independently with all I/ADL without DME, driving has been significantly affected.  Patient will benefit from skilled OT  services to maximize level of independence with deficits listed above.    Rehab potential is good    Activity tolerance: Good    Discharge recommendations: home     Equipment recommendations:  (TBD)     GOALS:    Occupational Therapy Goals        Problem: Occupational Therapy Goal    Goal Priority Disciplines Outcome Interventions   Occupational Therapy Goal     OT, PT/OT     Description:  Goals to be met by: 8/24/17     Patient will increase functional independence with ADLs by performing:    Feeding with [place].  UE Dressing with [place].  LE Dressing with Modified [place].  Grooming while seated with [place].  Toileting from toilet with Modified [place] for hygiene and clothing management.   Bathing from shower chair/bench with Supervision.  Shower transfer with Supervision.  Toilet transfer to toilet with Modified [place].                      PLAN: Patient to be seen 6 x/week to address the above listed problems via self-care/home management, community/work re-entry, therapeutic activities, therapeutic exercises, therapeutic groups, wheelchair management/training  Plan of Care expires: 08/24/17  Plan of Care reviewed with: patient    CHEL Prince LOTR  08/10/2017

## 2017-08-10 NOTE — PLAN OF CARE
Problem: Patient Care Overview  Goal: Plan of Care Review  Outcome: Ongoing (interventions implemented as appropriate)  Slept fair, pulled out iv overnight, was not aware of event. Pain is controlled, safety maintained.

## 2017-08-10 NOTE — PLAN OF CARE
Problem: SLP Goal  Goal: SLP Goal  Long term goals:  1. Pt will use appropriate memory strategies to schedule and recall weekly activities, express needs and recall names to maintain safety and participate socially in functional living environment.     Short term goals:  1. Pt will demonstrate use of memory strategies with 80% acc given verbal and/or visual cues with 80% acc following review.  2. Pt will recall a 3 part functional message related to ADL's with min verbal and visual assistance immediately after review with 80% acc.  3. Pt will recall daily activities via retelling/answering questions with 80% acc given min verbal and visual assistance   4. Pt will use external memory aids and compensatory strategies to recall routine, personal information, and recent events to improve orientation to time & recall daily events with 80% acc and min verbal and/or visual cues.   5. Pt will participate in ongoing assessment of reasoning, problem solving and safety awareness skills   Outcome: Ongoing (interventions implemented as appropriate)  Saman Crockett is a 88 y.o. male with a medical diagnosis of s/p total hip arthroplasty and presents with severe short term memory deficits. Pt would benefit from ST services to introduce compensatory strategies for enhanced memory, safety, and orientation.

## 2017-08-10 NOTE — CONSULTS
The enoxaparin dose has been adjusted for Saman Crockett [id number] according to the pharmacy practice protocol.      Based on the patient's Estimated Creatinine Clearance: 40 mL/min (based on Cr of 1.4)., Body mass index is 24.95 kg/m²., and weight= 83.5 kg (184 lb), the enoxaparin dose has been adjusted 40 mg every 24 hours for CrCl =/>30.    Thank you,  Wilian Ace, Pharmacist

## 2017-08-10 NOTE — PT/OT/SLP EVAL
"Speech Language Pathology  Evaluation    Saman Crockett   MRN: [id number]   Admitting Diagnosis: s/p total hip arthroplasty     Diet recommendations: Solid Diet Level: Regular  Liquid Diet Level: Thin     SLP Treatment Date: 08/10/17  Speech Start Time: 1420     Speech Stop Time: 1505     Speech Total (min): 45 min       TREATMENT BILLABLE MINUTES:  Eval 35  and Speech Therapy Individual 10    Diagnosis: s/p total hip arthroplasty    Past Medical History:   Diagnosis Date    Diabetes mellitus     Elevated PSA     Hyperlipidemia     Kidney stone     Thyroid disease      Past Surgical History:   Procedure Laterality Date    LITHOTRIPSY      tonisilectomy         Has the patient been evaluated by SLP for swallowing? 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 Enjoys yard work; dependent for bill management. Independent for all other ADLs.     Subjective:  "I don't have the funkiest idea of why i'm here"   Patient goals: go home     Pain/Comfort  Pain Rating 1: 0/10     Objective:   Patient found with: bed alarm, elena catheter, oxygen, peripheral IV        Cognitive Status:  Behavioral Observations: alert and appropriate-  Memory: Pt with significant short-term memory deficits; Pt recalled 2/3 items immediately after verbal presentation with category cue for each. Accuracy did not improve with repetition. Following 2 minute delay, Pt was able to recall 3/3 items with category cue. Pt was instructed on visual cues w/in immediate environment to enhance orientation. Pt was unable to identify and utilize visual cues following 3 minute delay.   Orientation: Oriented to self and year; oriented to place, city, date. Pt not oriented to current medical situation   Attention: WFL; no errors in sustained attention or serial subtraction tasks   Problem " Solving: Ongoing assessment   Pragmatics: [facility]   Executive Function: Compare/contrast: 100% acc; Organization: 100% acc with MoCa clock drawing and identifying/completing pattern task    Language: [facility]     Auditory Comprehension: 100% w/ complex Y/N and complex 2 step commands; Pt answered questions following short paragraph (4 sentences) with 60% acc increasing to 100% acc w/ binary choice     Verbal Expression: Pt fluent at the conversational level; named objects with 100% acc and abstract pictures with 2/3 accuracy    Motor Speech: No dysarthria     Voice: WFL    Reading: Ongoing assessment     Writing: Ongoing assessment     Visual-Spatial: Appropriate sizing and spacing throughout written tasks; no evidence of neglect     Additional Treatment:    [place] Cognitive Assessment (MoCA) score - 17 out of 30 indicating moderate-severe cognitive-linguistic deficits     Pt was educated on environmental cues to enhance orientation to environment; Following instruction, Pt demonstrated enhanced orientation and reduced confusion. Following 10 minute delay, Pt was prompted to demonstrate orientation, however was unable to provide accurate responses. With visual cue to utilize board, Pt was able to self orient.     Assessment:  Saman Crockett is a 88 y.o. male with a medical diagnosis of s/p total hip arthroplasty and presents with severe short term memory deficits and mild cognitive linguistic impairment. Pt would benefit from ST services to introduce compensatory strategies for enhanced memory, safety, and orientation.        Discharge recommendations: Discharge Facility/Level Of Care Needs: home     Goals:    SLP Goals        Problem: SLP Goal    Goal Priority Disciplines Outcome   SLP Goal     SLP Ongoing (interventions implemented as appropriate)   Description:  Long term goals:  1. Pt will use appropriate memory strategies to schedule and recall weekly activities, express needs and recall names to maintain safety and  participate socially in functional living environment.     Short term goals:  1. Pt will demonstrate use of memory strategies with 80% acc given verbal and/or visual cues with 80% acc following review.  2. Pt will recall a 3 part functional message related to ADL's with min verbal and visual assistance immediately after review with 80% acc.  3. Pt will recall daily activities via retelling/answering questions with 80% acc given min verbal and visual assistance   4. Pt will use external memory aids and compensatory strategies to recall routine, personal information, and recent events to improve orientation to time & recall daily events with 80% acc and min verbal and/or visual cues.   5. Pt will participate in ongoing assessment of reasoning, problem solving and safety awareness skills                      Plan:   Patient to be seen Therapy Frequency: 5 x/week   Plan of Care expires:    Plan of Care reviewed with: patient  SLP Follow-up?: Yes  SLP - Next Visit Date: 08/11/17           ST Berna  08/10/2017

## 2017-08-10 NOTE — PT/OT/SLP EVAL
"PhysicalTherapy   Evaluation    Saman Crockett   MRN: [id number]     PT Received On: 08/10/17  PT Start Time: 0945     PT Stop Time: 1005   PT Start Time: 1240  PT Stop Time: 1335    PT Total Time (min): 75 min       Billable Minutes:  Evaluation 60 and Therapeutic Activity 15  Total Minutes: 75    Diagnosis: R hip fracture with Anterior Arthroplasty  PT Diagnosis: Impaired mobility and gait disturbance  Past Medical History:   Diagnosis Date    Diabetes mellitus     Elevated PSA     Hyperlipidemia     Kidney stone     Thyroid disease       Past Surgical History:   Procedure Laterality Date    LITHOTRIPSY      tonisilectomy         Referring physician: Dr. Gutierrez  Date referred to PT: 8/09/2017    General Precautions: Standard, fall, hearing impaired  Orthopedic Precautions: RLE weight bearing as tolerated, RLE anterior precautions   Braces:            Patient History:  Lives With: spouse  Living Arrangements: house  Home Accessibility: stairs to enter home  Home Layout: Able to live on 1st floor  Number of Stairs to Enter Home: 2  Stair Railings at Home: outside, present on left side  Transportation Available: family or friend will provide  Equipment Currently Used at Home: none    DME owned (not currently used): none    Previous Level of Function:  Ambulation Skills: independent  Transfer Skills: independent  ADL Skills: independent  Work/Leisure Activity: independent    Subjective:  Communicated with nurse Perea prior to session.  Twice during pm session, pt reported "I can't remember anything anymore."    Chief Complaint: R thigh pain (Initially, pt c/o PT coming too early and pt refused to get oob in am for physical examination)  Patient goals: To get better and go home.  Family goals: Family not present    Pain/Comfort  Pain Rating 1: 3/10  Location - Side 1: Right  Location 1: thigh  Pain Addressed 1: Reposition, Distraction, Nurse notified  Pain Rating Post-Intervention 1: 3/10    Objective:  Patient " found lying in bed in am.  Pt participated in interview with PT in am, but refused to get oob for physical examination.  In pm, pt found in w/c after just finishing lunch.  Pt on 2Lpm O2 by nc.   Patient found with: elena catheter, oxygen, peripheral IV    Cognitive Exam:  Oriented to: Person and Situation  Follows Commands/attention: Follows one-step commands  Communication: clear/fluent  Safety awareness/insight to disability: impaired    Physical Exam:  Postural examination/scapula alignment: Rounded shoulder, Head forward and Posterior pelvic tilt    Skin integrity: Visible skin intact  Edema: Mild R LE    Sensation:   Intact  light/touch B LEs    Upper Extremity Range of Motion:  Refer to OT evaluation for UE ROM.    Upper Extremity Strength:  Refer to OT evaluation for UE strength.    Lower Extremity Range of Motion:  Right Lower Extremity: WFL except knee limited approximately 25% & hip limited approximately 50%  Left Lower Extremity: WFL    Lower Extremity Strength:  Right Lower Extremity: grossly 2+ > 3-/5  Left Lower Extremity: grossly 4+/5     Fine motor coordination:  See OT evaluation    Gross motor coordination: WFL    Functional Mobility:    Bed Mobility :   Supine to sit: Moderate Assistance   Sit to supine: Moderate Assistance   Rolling: Minimal Assistance   Scooting: Minimal Assistance    Transfers:  Sit to stand:Minimal Assistance and Moderate Assistance with Rolling Walker & vc  Bed <> Chair:  step turn with Minimal Assistance with Rolling Walker & vc    Wheelchair:  Pt propelled Standard wheelchair x 200 feet on Level tile with  Bilateral upper extremity with Supervision or Set-up Assistance and extra time needed due to UE fatigue and mild SOB.     Gait:  Patient ambulated FWB/WBAT: right lower extremity 40 and 35  feet on level tile with Rolling Walker with Minimal Assistance.  Pt with demonstarting a  3-point gait with decreased cristiana, decreased step length, decreased stride length and  decreased weight-shifting ability.Impairments contributing to gait deviations include impaired balance, pain and decreased strength.  For 2nd ambulation trial, O2 removed, but SPO2 dropped to 84% after gait trial of room air.      Stairs:  Not Tested      Balance:   Static Sit: GOOD-: Takes MODERATE challenges from all directions but inconsistently  Dynamic Sit:  FAIR+: Maintains balance through MINIMAL excursions of active trunk motion  Static Stand: POOR+: Needs MINIMAL assist to maintain  Dynamic stand: POOR: N/A    Endurance deficit:  Pt needed breaks due to SOB with exertion despite O2.    Additional Treatment:  Pt transferred into bed at end of pm session as noted above.  Pt educated on Rehab process and need to participate in 3 hours of therapy at least 5 days each week to meet requirement for this level of care.    Patient left HOB elevated with all lines intact, call button in reach, bed alarm on and nurse notified.    Assessment:  Saman Crockett is a 88 y.o. male with a medical diagnosis of <principal problem not specified>. He presents with impaired mobility and gait due to R hip arthroplasty.  Pt exhibits O2 dependence, impaired memory, impaired balance and safety awareness and antalgic gait pattern.  Pt would benefit from inpatient PT to increase independence and safety with transfers and gait prior to discharge home.    Rehab potential is good.    Activity tolerance: Fair    Plan: plan care intiated, bed mobility initiated, transfer training iniated, gait training, balance training, ROM, strengthening, wheelchair management/propulsion and endurance training and group procedures.    Discharge recommendations: home, home health PT     Equipment recommendations: walker, rolling    GOALS:    Physical Therapy Goals        Problem: Physical Therapy Goal    Goal Priority Disciplines Outcome Goal Variances Interventions   Physical Therapy Goal     PT/OT, PT Ongoing (interventions implemented as appropriate)      Description:  Goals to be met by: 2017     Patient will increase functional independence with mobility by performin. Supine to sit with Stand-by Assistance  2. Sit to supine with Stand-by Assistance  3. Sit to stand transfer with Stand-by Assistance  4. Bed to chair transfer with Stand-by Assistance using Rolling Walker  5. Gait  x 200 feet with Stand-by Assistance using Rolling Walker.   6. Wheelchair propulsion x 200 feet with Modified [place] using bilateral uppper extremities  7. Ascend/descend at least 4 stair with left Handrail Contact Guard Assistance.                      PLAN:    Patient to be seen daily to address the above listed problems via gait training, therapeutic activities, therapeutic exercises, therapeutic groups, wheelchair management/training  Plan of Care expires: 17  Plan of Care reviewed with: patient          Re Strong, PT 8/10/2017

## 2017-08-11 LAB
ABO + RH BLD: NORMAL
BACTERIA UR CULT: NO GROWTH
BLD GP AB SCN CELLS X3 SERPL QL: NORMAL
BLD PROD TYP BPU: NORMAL
BLD PROD TYP BPU: NORMAL
BLOOD UNIT EXPIRATION DATE: NORMAL
BLOOD UNIT EXPIRATION DATE: NORMAL
BLOOD UNIT TYPE CODE: 9500
BLOOD UNIT TYPE CODE: 9500
BLOOD UNIT TYPE: NORMAL
BLOOD UNIT TYPE: NORMAL
CODING SYSTEM: NORMAL
CODING SYSTEM: NORMAL
DISPENSE STATUS: NORMAL
DISPENSE STATUS: NORMAL
ERYTHROCYTE [DISTWIDTH] IN BLOOD BY AUTOMATED COUNT: 14.2 %
HCT VFR BLD AUTO: 22.1 %
HGB BLD-MCNC: 7.6 G/DL
MCH RBC QN AUTO: 31.1 PG
MCHC RBC AUTO-ENTMCNC: 34.4 G/DL
MCV RBC AUTO: 90 FL
NUM UNITS TRANS PACKED RBC: NORMAL
NUM UNITS TRANS PACKED RBC: NORMAL
OB PNL STL: NEGATIVE
PLATELET # BLD AUTO: 279 K/UL
PMV BLD AUTO: 7 FL
POCT GLUCOSE: 118 MG/DL (ref 70–110)
POCT GLUCOSE: 120 MG/DL (ref 70–110)
POCT GLUCOSE: 148 MG/DL (ref 70–110)
POCT GLUCOSE: 50 MG/DL (ref 70–110)
RBC # BLD AUTO: 2.45 M/UL
WBC # BLD AUTO: 10.7 K/UL

## 2017-08-11 PROCEDURE — 36415 COLL VENOUS BLD VENIPUNCTURE: CPT

## 2017-08-11 PROCEDURE — 97532 *HC SP COG SKL DEV EA 15 MIN: CPT

## 2017-08-11 PROCEDURE — 94761 N-INVAS EAR/PLS OXIMETRY MLT: CPT

## 2017-08-11 PROCEDURE — P9016 RBC LEUKOCYTES REDUCED: HCPCS

## 2017-08-11 PROCEDURE — 97530 THERAPEUTIC ACTIVITIES: CPT

## 2017-08-11 PROCEDURE — 30233N1 TRANSFUSION OF NONAUTOLOGOUS RED BLOOD CELLS INTO PERIPHERAL VEIN, PERCUTANEOUS APPROACH: ICD-10-PCS | Performed by: PHYSICAL MEDICINE & REHABILITATION

## 2017-08-11 PROCEDURE — 97535 SELF CARE MNGMENT TRAINING: CPT

## 2017-08-11 PROCEDURE — 25000003 PHARM REV CODE 250: Performed by: PHYSICAL MEDICINE & REHABILITATION

## 2017-08-11 PROCEDURE — 97110 THERAPEUTIC EXERCISES: CPT

## 2017-08-11 PROCEDURE — 86850 RBC ANTIBODY SCREEN: CPT

## 2017-08-11 PROCEDURE — 97116 GAIT TRAINING THERAPY: CPT

## 2017-08-11 PROCEDURE — 86900 BLOOD TYPING SEROLOGIC ABO: CPT

## 2017-08-11 PROCEDURE — 85027 COMPLETE CBC AUTOMATED: CPT

## 2017-08-11 PROCEDURE — 97542 WHEELCHAIR MNGMENT TRAINING: CPT

## 2017-08-11 PROCEDURE — 86920 COMPATIBILITY TEST SPIN: CPT

## 2017-08-11 PROCEDURE — 92507 TX SP LANG VOICE COMM INDIV: CPT

## 2017-08-11 PROCEDURE — 12800000 HC REHAB SEMI-PRIVATE ROOM

## 2017-08-11 PROCEDURE — 63600175 PHARM REV CODE 636 W HCPCS: Performed by: PHYSICAL MEDICINE & REHABILITATION

## 2017-08-11 PROCEDURE — 82272 OCCULT BLD FECES 1-3 TESTS: CPT

## 2017-08-11 RX ORDER — HYDROCODONE BITARTRATE AND ACETAMINOPHEN 500; 5 MG/1; MG/1
TABLET ORAL
Status: DISCONTINUED | OUTPATIENT
Start: 2017-08-11 | End: 2017-08-18

## 2017-08-11 RX ADMIN — GEMFIBROZIL 600 MG: 600 TABLET ORAL at 06:08

## 2017-08-11 RX ADMIN — METFORMIN HYDROCHLORIDE 750 MG: 750 TABLET, EXTENDED RELEASE ORAL at 04:08

## 2017-08-11 RX ADMIN — SODIUM CHLORIDE: 0.9 INJECTION, SOLUTION INTRAVENOUS at 01:08

## 2017-08-11 RX ADMIN — GLIPIZIDE 5 MG: 5 TABLET, FILM COATED, EXTENDED RELEASE ORAL at 08:08

## 2017-08-11 RX ADMIN — GEMFIBROZIL 600 MG: 600 TABLET ORAL at 04:08

## 2017-08-11 RX ADMIN — ESCITALOPRAM 10 MG: 10 TABLET, FILM COATED ORAL at 09:08

## 2017-08-11 RX ADMIN — METFORMIN HYDROCHLORIDE 750 MG: 750 TABLET, EXTENDED RELEASE ORAL at 08:08

## 2017-08-11 RX ADMIN — TAMSULOSIN HYDROCHLORIDE 0.4 MG: 0.4 CAPSULE ORAL at 09:08

## 2017-08-11 RX ADMIN — ACETAMINOPHEN 650 MG: 325 TABLET, FILM COATED ORAL at 12:08

## 2017-08-11 RX ADMIN — FERROUS GLUCONATE 324 MG: 324 TABLET ORAL at 09:08

## 2017-08-11 RX ADMIN — ASPIRIN 325 MG ORAL TABLET 325 MG: 325 PILL ORAL at 09:08

## 2017-08-11 RX ADMIN — THERA TABS 1 TABLET: TAB at 09:08

## 2017-08-11 RX ADMIN — ENALAPRIL MALEATE 2.5 MG: 2.5 TABLET ORAL at 09:08

## 2017-08-11 RX ADMIN — LEVOTHYROXINE SODIUM 150 MCG: 150 TABLET ORAL at 06:08

## 2017-08-11 RX ADMIN — ENOXAPARIN SODIUM 40 MG: 100 INJECTION SUBCUTANEOUS at 04:08

## 2017-08-11 RX ADMIN — MEMANTINE HYDROCHLORIDE 28 MG: 28 CAPSULE, EXTENDED RELEASE ORAL at 09:08

## 2017-08-11 NOTE — PLAN OF CARE
"Problem: SLP Goal  Goal: SLP Goal  Long term goals:  1. Pt will use appropriate memory strategies to schedule and recall weekly activities, express needs and recall names to maintain safety and participate socially in functional living environment.     Short term goals:  1. Pt will demonstrate use of memory strategies with 80% acc given verbal and/or visual cues with 80% acc following review.  2. Pt will recall a 3 part functional message related to ADL's with min verbal and visual assistance immediately after review with 80% acc.  3. Pt will recall daily activities via retelling/answering questions with 80% acc given min verbal and visual assistance   4. Pt will use external memory aids and compensatory strategies to recall routine, personal information, and recent events to improve orientation to time & recall daily events with 80% acc and min verbal and/or visual cues.   5. Pt will participate in ongoing assessment of reasoning, problem solving and safety awareness skills    Outcome: Ongoing (interventions implemented as appropriate)  Pt noted with tonyargly vocal quality that increased toward end of sentence length utterance. When asked about coughing/difficulties swallowing, Pt stated, "well maybe. I dont have the memory to tell ya". Pt seen with open cup sip x3 and straw sips (single and sequential) of thin liquid with no overt s/s of aspiration or changes in vocal quality observed. Pt was educated on swallowing mechanism and aspiration precautions. Instructed Pt to monitor vocal quality for wetness and to complete volitional swallow when noted.       "

## 2017-08-11 NOTE — H&P
PHYSICAL MEDICINE AND REHABILITATION POST-ADMISSION EVALUATION    PHYSICIAN ADMISSION UPDATE AND COMMENTS REGARDING PREADMISSION SCREENING STATUS:    There are no substantial changes between the preadmission assessment and the   patient's current status.    APPROPRIATENESS FOR ADMISSION TO INPATIENT REHABILITATION FACILITY:  The   patient's condition is sufficiently stable to allow active participation in   intensive inpatient rehabilitation program.  The patient would benefit from   inpatient rehabilitation due to the followin.  Three hours of therapy at least 5 days per week.    PLAN FOR COMMUNITY DISCHARGE:  The patient has good family support.  The patient   is motivated.  The patient is willing to participate.  The patient is   cognitively relatively intact.  The patient has good premorbid functional status   and there is a need for interdisciplinary inpatient rehabilitation provided by   a physician with rehab focused nursing and need for PT and OT.    REHAB DIAGNOSIS:  Status post right total hip arthroplasty.    IMPAIRMENTS AND DISABILITIES:  Pain, inability to ambulate safely and inability   to manage self ADLs.    REHABILITATION PRECAUTIONS AND RESTRICTIONS:  Fall.    POSSIBLE BARRIERS TO PROGRESS AND DISCHARGE:  The patient's progress may be   impaired by the followin.  Anemia.  2.  Hypertension.  3.  Diabetes.  4.  Pain.  5.  Gait disorder.    ACTIVE PROBLEM LIST AND POTENTIAL COMPLICATION FROM PATIENT'S MEDICAL CONDITION   AND PLAN TO PREVENT AND ADDRESS THESE:  The patient remains at risk for fall,   DVT as well as pressure ulcer; however, likely above will be decreased as the   patient will be engaged in therapeutic activities.    THIS PATIENT'S MEDICAL COMPLEXITY REQUIRES CLOSE PHYSICIAN SUPERVISION AND   24-HOUR REHABILITATION NURSING CARE TO ADDRESS THE ETIOLOGIC DIAGNOSIS, WHICH IS   FURTHER COMPLICATED BY FOLLOWING COMORBIDITIES:  1.  Anemia.  2.  Hypertension.  3.  Diabetes.  4.   Pain.  5.  Gait disorder.    FUNCTIONAL GOALS TO BE ACHIEVED:  Modified independent.    THE PATIENT REQUIRES INTENSIVE INPATIENT REHABILITATION WITH CARE AND TREATMENT   BY FOLLOWING DISCIPLINES:  1.  Medical supervision.  2.  A 24-hour rehabilitation nursing.  3.  Physical therapy.  4.  Occupational therapy.    PLAN OF CARE:  The interdisciplinary team will work collaboratively to address   the patient's problem as identified on the individualized interdisciplinary plan   of care.  The plan of care will be initiated and reviewed on a regular basis to   ensure that all appropriate concerns are being addressed throughout the entire   rehab stay.    THE PATIENT'S EXPECTED LEVEL OF IMPROVEMENT WITH INPATIENT REHABILITATION   ADMISSION:  Good.    ANTICIPATED DESTINATION POST-DISCHARGE FROM INPATIENT REHABILITATION:  Home with   family.    INTERDISCIPLINARY CARE PLAN:  Reviewed and there are no changes indicated at   this time.    ESTIMATED LENGTH OF STAY:  Approximately 10 to 14 days.    MEDICAL REHABILITATION PROGNOSIS:  Good.    ARE THE ESTABLISHED GOALS SUFFICIENT FOR ACHIEVING THE OPTIMAL LEVEL OF   FUNCTION:  Yes.    ARE THE INTERVENTION NOTED ALONG WITH THE MEDICAL INTERVENTION AS DOCUMENTED IN   THE HISTORY AND PHYSICAL SUFFICIENT FOR ACHIEVING THE OPTIMAL LEVEL OF FUNCTION:    Yes.    THERAPY PLAN:  Physical therapy b.i.d. for 5 days and every day for Saturday and   Sunday.  Occupational therapy b.i.d. for 5 days and every day for Saturday and   Sunday.    ANTICIPATED DISCHARGE DESTINATION:  Home with family.      DEANNA  dd: 08/11/2017 08:31:28 (CDT)  td: 08/11/2017 16:16:06 (CDT)  Doc ID   #[id number]  Job ID #[id number]    CC:

## 2017-08-11 NOTE — PT/OT/SLP PROGRESS
"Speech Language Pathology Treatment          Saman Crockett   MRN: [id number]     Diet recommendations: Solid Diet Level: Regular  Liquid Diet Level: Thin     SLP Treatment Date: 08/11/17  Speech Start Time: 0935     Speech Stop Time: 1035     Speech Total (min): 60 min       TREATMENT BILLABLE MINUTES:  Speech Therapy Individual 60    General Precautions: Standard, fall, hearing impaired        Subjective:  "I don't know where the hell I am"    Objective:   Patient found with: elena catheter, peripheral IV  1. Pt will demonstrate use of memory strategies with 80% acc given verbal and/or visual cues with 80% acc following review.  2. Pt will use external memory aids and compensatory strategies to recall routine, personal information, and recent events to improve orientation to time & recall daily events with 80% acc and min verbal and/or visual cues.   Pain/Comfort  Pain Rating 1: 0/10    Assessment:  Saman Crockett is a 88 y.o. male with a medical diagnosis of s/p total hip arthroplasty and presents with severe short term memory deficits. Pt would benefit from ST services to introduce compensatory strategies for enhanced memory, safety, and orientation. Pt provided with monthly calendar to enhance orientation to date, place, and therapy schedule. With mod assistance and intermittent cueing to utilize white board for information, Pt was able to copy target information for more convenient access. Calendar was then used throughout spaced retrieval task at 1,3,6, and 12 minute delays. Following initial 1 minute delay, Pt independently recalled city and required verbal prompt to use calendar for remaining information. For remainder of retrievals, Pt was able to recall target information independently with 100% acc.    Pt noted with gurgly vocal quality that increased toward end of sentence length utterance. When asked about coughing/difficulties swallowing, Pt stated, "well maybe. I dont have the memory to tell ya". Pt " seen with open cup sip x3 and straw sips (single and sequential) of thin liquid with no overt s/s of aspiration or changes in vocal quality observed. Pt was educated on swallowing mechanism and aspiration precautions. Instructed Pt to monitor vocal quality for wetness and to complete volitional swallow when noted.     Diet recommendations:        Liquid Diet Level: Thin  Solid: Regular     Discharge recommendations: Discharge Facility/Level Of Care Needs: home     Goals:    SLP Goals        Problem: SLP Goal    Goal Priority Disciplines Outcome   SLP Goal     SLP Ongoing (interventions implemented as appropriate)   Description:  Long term goals:  1. Pt will use appropriate memory strategies to schedule and recall weekly activities, express needs and recall names to maintain safety and participate socially in functional living environment.     Short term goals:  1. Pt will demonstrate use of memory strategies with 80% acc given verbal and/or visual cues with 80% acc following review.  2. Pt will recall a 3 part functional message related to ADL's with min verbal and visual assistance immediately after review with 80% acc.  3. Pt will recall daily activities via retelling/answering questions with 80% acc given min verbal and visual assistance   4. Pt will use external memory aids and compensatory strategies to recall routine, personal information, and recent events to improve orientation to time & recall daily events with 80% acc and min verbal and/or visual cues.   5. Pt will participate in ongoing assessment of reasoning, problem solving and safety awareness skills                      Plan:   Patient to be seen Therapy Frequency: 5 x/week   Plan of Care Expires: 08/24/17  Plan of Care reviewed with: patient  SLP Follow-up?: Yes  SLP - Next Visit Date: 08/11/17           ST Berna 8/11/2017

## 2017-08-11 NOTE — PLAN OF CARE
Problem: Physical Therapy Goal  Goal: Physical Therapy Goal  Goals to be met by: 2017     Patient will increase functional independence with mobility by performin. Supine to sit with Stand-by Assistance  2. Sit to supine with Stand-by Assistance  3. Sit to stand transfer with Stand-by Assistance  4. Bed to chair transfer with Stand-by Assistance using Rolling Walker  5. Gait  x 200 feet with Stand-by Assistance using Rolling Walker.   6. Wheelchair propulsion x 200 feet with Modified [place] using bilateral uppper extremities  7. Ascend/descend at least 4 stair with left Handrail Contact Guard Assistance.     Outcome: Ongoing (interventions implemented as appropriate)    PT for there ex, w/c, activity and gait.

## 2017-08-11 NOTE — PLAN OF CARE
Problem: Occupational Therapy Goal  Goal: Occupational Therapy Goal  Goals to be met by: 8/24/17     Patient will increase functional independence with ADLs by performing:    Feeding with [place].  UE Dressing with [place].  LE Dressing with Modified [place].  Grooming while seated with [place].  Toileting from toilet with Modified [place] for hygiene and clothing management.   Bathing from shower chair/bench with Supervision.  Shower transfer with Supervision.  Toilet transfer to toilet with Modified [place].     Outcome: Ongoing (interventions implemented as appropriate)  OT goals remain appropriate

## 2017-08-11 NOTE — PROGRESS NOTES
[facility]  Physical Medicine & Rehab  Progress Note    Patient Name: Saman Crockett  MRN: [id number]  Patient Class: IP- Rehab   Admission Date: 8/9/2017  Length of Stay: 2 days  Attending Physician: Laura Gutierrez MD  Primary Care Provider: Rajan Munson MD    Subjective:     Principal Problem:  S/p R TIFFANIE   Interval History: pt is 88 y.o male  With anemia & need for transfusion today     Scheduled Medications:  aspirin  325 mg Oral BID    enalapril  2.5 mg Oral Daily    enoxaparin  40 mg Subcutaneous Daily    escitalopram oxalate  10 mg Oral Daily    ferrous gluconate  324 mg Oral Daily with breakfast    gemfibrozil  600 mg Oral BID AC    glipiZIDE  5 mg Oral Daily with breakfast    insulin detemir  30 Units Subcutaneous QHS    levothyroxine  150 mcg Oral Before breakfast    memantine  28 mg Oral Daily    metformin  750 mg Oral BID WM    multivitamin  1 tablet Oral Daily    tamsulosin  0.4 mg Oral Daily       PRN Medications: sodium chloride, acetaminophen, aluminum-magnesium hydroxide-simethicone, bisacodyl, hydrocodone-acetaminophen 5-325mg, magnesium citrate    Review of Systems   Constitutional: Negative.    HENT: Negative.    Eyes: Negative.    Respiratory: Negative.    Cardiovascular: Negative.    Gastrointestinal: Negative.    Endocrine: Negative.    Genitourinary: Negative.    Musculoskeletal: Negative.    Skin: Negative.    Allergic/Immunologic: Negative.    Neurological: Negative.    Hematological: Negative.    Psychiatric/Behavioral: Negative.      Objective:     Vital Signs (Most Recent):  Temp: 97.6 °F (36.4 °C) (08/11/17 0711)  Pulse: (!) 51 (08/11/17 0711)  Resp: 18 (08/11/17 0711)  BP: (!) 126/55 (08/11/17 0711)  SpO2: 98 % (08/11/17 0711)    Vital Signs (24h Range):  Temp:  [97.6 °F (36.4 °C)-98.5 °F (36.9 °C)] 97.6 °F (36.4 °C)  Pulse:  [51-80] 51  Resp:  [16-20] 18  SpO2:  [93 %-98 %] 98 %  BP: (115-150)/(52-69) 126/55     Physical Exam   Constitutional: He  is oriented to person, place, and time. He appears well-developed and well-nourished. No distress.   HENT:   Head: Normocephalic and atraumatic.   Right Ear: External ear normal.   Left Ear: External ear normal.   Nose: Nose normal.   Mouth/Throat: Oropharynx is clear and moist. No oropharyngeal exudate.   Eyes: Conjunctivae and EOM are normal. Pupils are equal, round, and reactive to light. Right eye exhibits no discharge. Left eye exhibits no discharge. No scleral icterus.   Neck: Normal range of motion. Neck supple. No JVD present. No tracheal deviation present. No thyromegaly present.   Cardiovascular: Normal rate, regular rhythm, normal heart sounds and intact distal pulses.  Exam reveals no gallop and no friction rub.    No murmur heard.  Pulmonary/Chest: Effort normal and breath sounds normal. No stridor. No respiratory distress. He has no wheezes. He has no rales. He exhibits no tenderness.   Abdominal: Soft. Bowel sounds are normal. He exhibits no distension and no mass. There is no tenderness. There is no rebound and no guarding. No hernia.   Genitourinary:   Genitourinary Comments: deferred   Musculoskeletal: Normal range of motion. He exhibits tenderness ( right hip ). He exhibits no edema or deformity.   Lymphadenopathy:     He has no cervical adenopathy.   Neurological: He is alert and oriented to person, place, and time. He has normal reflexes. He displays normal reflexes. No cranial nerve deficit. He exhibits normal muscle tone. Coordination normal.   Skin: No rash noted. He is not diaphoretic. No erythema. No pallor.   Psychiatric: He has a normal mood and affect. His behavior is normal. Judgment and thought content normal.     NEUROLOGICAL EXAMINATION:     MENTAL STATUS   Oriented to person, place, and time.     CRANIAL NERVES     CN III, IV, VI   Pupils are equal, round, and reactive to light.  Extraocular motions are normal.       Assessment/Plan:      Saman Crockett is a 88 y.o. male admitted  to inpatient rehabilitation on 8/9/2017 for <principal problem not specified> with impaired mobility and ADLs. Patient remains appropriate for PT, OT, and as required Speech therapy. Patient continues to require 24 hour nursing care as well as daily Physician assessment.    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] 08/09/2017 Not Applicable      Problems Resolved During this Admission:    Diagnosis Date Noted Date Resolved POA     S/p R TIFFANIE, cont PT, OT  DVT prophylaxis, cont sq Lovenox  DM, accu check noted, cont current meds  HTN, vitals noted, cont current meds  Anemia, consent & transfuse today     DISCHARGE PLANNING:  Tentative Discharge Date:     Future Appointments  Date Time Provider Department Center   8/30/2017 1:00 PM Rajan Munson MD [facility] [address]       Laura Gutierrez MD  Department of Physical Medicine & Rehab  [facility]

## 2017-08-11 NOTE — PLAN OF CARE
Problem: Patient Care Overview  Goal: Plan of Care Review  Outcome: Ongoing (interventions implemented as appropriate)  VSS tolerating therapy well no acute distress noted at this time

## 2017-08-11 NOTE — PT/OT/SLP PROGRESS
Physical Therapy         Treatment        Saman Crockett   MRN: [id number]     PT Received On: 08/11/17  Total Time (min): 60        Billable Minutes:  Gait [id number], Therapeutic Activity 10, Therapeutic Exercise 20 and Train/Wheelchair Management 15  Total Minutes: 60    Treatment Type: Treatment  PT/PTA: PT             General Precautions: Standard, fall, hearing impaired  Orthopedic Precautions: Orthopedic Precautions : RLE weight bearing as tolerated, RLE anterior precautions   Braces: Braces: N/A         Subjective:  Communicated with nurse Black prior to session.  Wayne reported pt will be getting a transfusion after therapy today.    Pain/Comfort  Pain Rating 1: 0/10 (pt reported soreness/tenderness, but not pain)  Location - Side 1: Right  Location 1: thigh  Pain Addressed 1: Distraction, Reposition, Cessation of Activity  Pain Rating Post-Intervention 1: 0/10    Objective:  Patient found sitting up in w/c.   Patient found with: elena catheter, peripheral IV    Transfer Training:  Sit to stand:Moderate Assistance with Rolling Walker & vc for technique    Wheelchair Training:  Pt propelled Standard wheelchair x 150 feet on Level tile with  Bilateral upper extremity with Supervision or Set-up Assistance.  SPO2 92% on RA after trial.    Gait Training:  On level tile x 125 feet with Rolling Walker and CGA to steady and vc to widen his GELY for increased stability on RA.  SPO2 dropped to 77% for short period after gait trial, but quickly increased to 100% on RA    Additional Treatment:  Pt performed B LE there ex sitting x 30 reps with vc for proper execution and joint protection: ap, laq, marching, hip abd/add, gs/qs.      Activity Tolerance:  Patient tolerated treatment well despite c/o soreness.    Patient left up in chair with call button in reach, chair alarm on and nurse notified.    Assessment:  Saman Crockett is a 88 y.o. male with a medical diagnosis of <principal problem not specified>. He presents  with impaired mobility and gait disturbance s/p R hip arthroplasty.  Pt tolerated most of session on RA until gait which resulted in pt desatting for short period.    Rehab potential is fair.    Activity tolerance: Fair    Discharge recommendations: Discharge Facility/Level Of Care Needs: home, home health PT     Equipment recommendations: Equipment Needed After Discharge: walker, rolling     GOALS:    Physical Therapy Goals        Problem: Physical Therapy Goal    Goal Priority Disciplines Outcome Goal Variances Interventions   Physical Therapy Goal     PT/OT, PT Ongoing (interventions implemented as appropriate)     Description:  Goals to be met by: 2017     Patient will increase functional independence with mobility by performin. Supine to sit with Stand-by Assistance  2. Sit to supine with Stand-by Assistance  3. Sit to stand transfer with Stand-by Assistance  4. Bed to chair transfer with Stand-by Assistance using Rolling Walker  5. Gait  x 200 feet with Stand-by Assistance using Rolling Walker.   6. Wheelchair propulsion x 200 feet with Modified [place] using bilateral uppper extremities  7. Ascend/descend at least 4 stair with left Handrail Contact Guard Assistance.                      PLAN:    Patient to be seen daily  to address the above listed problems via gait training, therapeutic activities, therapeutic exercises, therapeutic groups, neuromuscular re-education  Plan of Care expires: 17  Plan of Care reviewed with: patient, spouse, daughter         Re Strong, PT 2017

## 2017-08-11 NOTE — PT/OT/SLP PROGRESS
Occupational Therapy  Treatment    Saman Crockett   MRN: [id number]   Admitting Diagnosis: femur fx with displacement s/p R hip arthroplasty     OT Date of Treatment: 08/11/17   Total Time (min): 75 min    Billable Minutes:  Self Care/Home Management 65 and Therapeutic Activity 10  Total Minutes: 75    General Precautions: Standard, fall, hearing impaired  Orthopedic Precautions: RLE weight bearing as tolerated, RLE anterior precautions  Braces: N/A    Do you have any cultural, spiritual, L[employer]-day conflicts, given your current situation?: None    Subjective:  Communicated with nurse prior to session.    Pain/Comfort  Pain Rating 1:  (no pain reported)    Objective:  Patient found with: elena catheter, peripheral IV (w/c alarm intact; pt found without oxygen) SpO2 measured immediately upon OT entry since pt found without oxygen - SpO2 measuring 96% to 99% on room air - nurse clearing pt for OT session without supplemental O2    Functional Mobility:  Transfer Training:  -Sit to stand:Minimal Assistance with Rolling Walker - performed 5+ times throughout session with verbal cues for safety and correct technique  -Toilet Transfer:  Pt Stand Pivot with Contact Guard Assistance with Rolling Walker x 2 throughout session (1x before shower and 1x after shower)  -Tub bench transfer Stand Pivot with Minimal Assistance with Rolling Walker - verbal instruction for correct technique including proper hand placement   >>>> Patient ambulating with CGA to SBA and RW from wheelchair > toilet > transfer tub bench > wheelchair approximately 45 ft with verbal instruction for safety awareness; pt then ambulating again from wheelchair <> toilet with CGA and RW with verbal cues for safety awareness -- approximately 50 ft with no LOB     Bathing:  Patient performed bathing with Minimal Assistance with grab bar, Handheld shower head and tub bench at Shower.- SBA in standing to wash/rinse/dry rehan area and buttocks with alternating UE support  of grab bar     UE Dressing:  Patient performed UE Dressing with Supervision or Set-up Assistance with Set up of items at transfer tub bench.    LE Dressing:  Patient don/doffed socks with Total Assistance  Patient don/doffed long pants with Maximum Assistance - pt requires (A) to thread LEs through clothing; pt able to manage clothing over hips in standing with CGA and UE support of RW     Toilet Training:  Pt performed toileting with Stand-by Assistance with RW and bedside commode positioned over toilet     Balance:   Static Stand: GOOD-: Takes MODERATE challenges from all directions inconsistently  Dynamic stand: FAIR: Needs CONTACT GUARD during gait    Additional Treatment:  - OTR reinforced OT role/POC, inpatient rehab therapy purpose & schedule, etc, correct/safe transfer techniques, safety awareness, adaptive techniques to increase independence with self care tasks and functional mobility  - pt will benefit from daily reinforcement    Patient left up in chair with all lines intact, call button in reach, chair alarm on and nurse notified    ASSESSMENT:  Saman Crockett is a 88 y.o. male with a medical diagnosis of femur fx with displacement s/p R hip arthroplasty and presents with daily improvements towards OT goals. Patient should continue to benefit from skilled OT services per est POC to increase functional independence with self care, functional mobility, and safety.    Rehab potential is good    Activity tolerance: Good    Discharge recommendations: home     Equipment recommendations:  (tbd)     GOALS:    Occupational Therapy Goals        Problem: Occupational Therapy Goal    Goal Priority Disciplines Outcome Interventions   Occupational Therapy Goal     OT, PT/OT Ongoing (interventions implemented as appropriate)    Description:  Goals to be met by: 8/24/17     Patient will increase functional independence with ADLs by performing:    Feeding with [place].  UE Dressing with [place].  LE  Dressing with Modified [place].  Grooming while seated with [place].  Toileting from toilet with Modified [place] for hygiene and clothing management.   Bathing from shower chair/bench with Supervision.  Shower transfer with Supervision.  Toilet transfer to toilet with Modified [place].                      Plan:  Patient to be seen 6 x/week to address the above listed problems via self-care/home management, community/work re-entry, therapeutic activities, therapeutic groups, therapeutic exercises, wheelchair management/training  Plan of Care expires: 08/24/17  Plan of Care reviewed with: patient    Chica CHEL May, BRAYDEN  08/11/2017

## 2017-08-12 LAB
POCT GLUCOSE: 117 MG/DL (ref 70–110)
POCT GLUCOSE: 145 MG/DL (ref 70–110)
POCT GLUCOSE: 48 MG/DL (ref 70–110)
POCT GLUCOSE: 79 MG/DL (ref 70–110)
POCT GLUCOSE: 86 MG/DL (ref 70–110)
POCT GLUCOSE: 96 MG/DL (ref 70–110)

## 2017-08-12 PROCEDURE — 25000003 PHARM REV CODE 250: Performed by: PHYSICAL MEDICINE & REHABILITATION

## 2017-08-12 PROCEDURE — 97542 WHEELCHAIR MNGMENT TRAINING: CPT

## 2017-08-12 PROCEDURE — 63600175 PHARM REV CODE 636 W HCPCS: Performed by: PHYSICAL MEDICINE & REHABILITATION

## 2017-08-12 PROCEDURE — 97110 THERAPEUTIC EXERCISES: CPT

## 2017-08-12 PROCEDURE — 94761 N-INVAS EAR/PLS OXIMETRY MLT: CPT

## 2017-08-12 PROCEDURE — 12800000 HC REHAB SEMI-PRIVATE ROOM

## 2017-08-12 PROCEDURE — 97532 *HC SP COG SKL DEV EA 15 MIN: CPT

## 2017-08-12 PROCEDURE — 97535 SELF CARE MNGMENT TRAINING: CPT

## 2017-08-12 PROCEDURE — 97116 GAIT TRAINING THERAPY: CPT

## 2017-08-12 RX ADMIN — TAMSULOSIN HYDROCHLORIDE 0.4 MG: 0.4 CAPSULE ORAL at 08:08

## 2017-08-12 RX ADMIN — ENOXAPARIN SODIUM 40 MG: 100 INJECTION SUBCUTANEOUS at 05:08

## 2017-08-12 RX ADMIN — THERA TABS 1 TABLET: TAB at 08:08

## 2017-08-12 RX ADMIN — GEMFIBROZIL 600 MG: 600 TABLET ORAL at 05:08

## 2017-08-12 RX ADMIN — GLIPIZIDE 5 MG: 5 TABLET, FILM COATED, EXTENDED RELEASE ORAL at 08:08

## 2017-08-12 RX ADMIN — GEMFIBROZIL 600 MG: 600 TABLET ORAL at 06:08

## 2017-08-12 RX ADMIN — LEVOTHYROXINE SODIUM 150 MCG: 150 TABLET ORAL at 06:08

## 2017-08-12 RX ADMIN — ENALAPRIL MALEATE 2.5 MG: 2.5 TABLET ORAL at 08:08

## 2017-08-12 RX ADMIN — ASPIRIN 325 MG ORAL TABLET 325 MG: 325 PILL ORAL at 08:08

## 2017-08-12 RX ADMIN — METFORMIN HYDROCHLORIDE 750 MG: 750 TABLET, EXTENDED RELEASE ORAL at 05:08

## 2017-08-12 RX ADMIN — FERROUS GLUCONATE 324 MG: 324 TABLET ORAL at 01:08

## 2017-08-12 RX ADMIN — MEMANTINE HYDROCHLORIDE 28 MG: 28 CAPSULE, EXTENDED RELEASE ORAL at 08:08

## 2017-08-12 RX ADMIN — ESCITALOPRAM 10 MG: 10 TABLET, FILM COATED ORAL at 08:08

## 2017-08-12 RX ADMIN — ACETAMINOPHEN 650 MG: 325 TABLET, FILM COATED ORAL at 02:08

## 2017-08-12 RX ADMIN — METFORMIN HYDROCHLORIDE 750 MG: 750 TABLET, EXTENDED RELEASE ORAL at 08:08

## 2017-08-12 RX ADMIN — ASPIRIN 325 MG ORAL TABLET 325 MG: 325 PILL ORAL at 09:08

## 2017-08-12 NOTE — PLAN OF CARE
Problem: Pain, Acute (Adult)  Goal: Acceptable Pain Control/Comfort Level  Patient will demonstrate the desired outcomes by discharge/transition of care.   Outcome: Ongoing (interventions implemented as appropriate)  Pain is well controlled on non narcotic analgesia. Pain is currently denied

## 2017-08-12 NOTE — PT/OT/SLP PROGRESS
"Physical Therapy         Treatment        Saman Crockett   MRN: [id number]     PT Received On: 08/12/17  Total Time (min): 60        Billable Minutes:  Gait [id number], Therapeutic Exercise 40 and Train/Wheelchair Management 10  Total Minutes: 60    Treatment Type: Treatment  PT/PTA: PTA     PTA Visit Number: 1       General Precautions: Standard, fall, hearing impaired  Orthopedic Precautions: Orthopedic Precautions : RLE weight bearing as tolerated, RLE anterior precautions   Braces:           Subjective:  Communicated with nurse Tia prior to session.  Pt stated, "I can tolerate it" when asked about his pain level.  Pt also stated, " I hurt but its not that bad."       Objective:  Patient found supine in bed, with Patient found with: elena catheter, peripheral IV    Functional Mobility:  Bed Mobility:   Supine to sit: Contact Guard Assistance   Sit to supine: Activity did not occur   Rolling: Contact Guard Assistance   Scooting: Contact Guard Assistance      Transfer Training:  Sit to stand:Minimal Assistance with No Assistive Device verbal cues for technique  Bed <> Chair:  Stand Pivot with Minimal Assistance with No Assistive Device verbal cues for technique    Wheelchair Training:  Pt propelled standard w/c 150' x 1 with CGA/SBA.  Pt occasionally needing assist with maneuvering w/c      Gait Training:  Patient gait trained FWB/WBAT: right lower extremity 80' x 2  feet on level tile with Rolling Walker with Contact Guard Assistance.  Pt with demonstarting a  3-point gait with decreased cristiana, increased time in double stance, decreased velocity of limb motion, decreased step length, decreased stride length, decreased swing-to-stance ratio, decreased toe-to-floor clearance and decreased weight-shifting ability.Impairments contributing to gait deviations include impaired balance, impaired coordination, pain, impaired postural control, decreased ROM and decreased strength    Additional Treatment:  Supine there ex " AP, QS, HS, abd, SLR 2 x 10 reps,    Seated there ex HR/TR, LAQ with 2#, ball squeezes x 30 reps  scifit L-1 x 10 min    Activity Tolerance:  Patient tolerated treatment well    Patient left up in chair with call button in reach and chair alarm on.    Assessment:  Saman Crockett is a 88 y.o. male with a medical diagnosis of <principal problem not specified>.    Rehab potential is good.    Activity tolerance: Good    Discharge recommendations:       Equipment recommendations:       GOALS:    Physical Therapy Goals        Problem: Physical Therapy Goal    Goal Priority Disciplines Outcome Goal Variances Interventions   Physical Therapy Goal     PT/OT, PT Ongoing (interventions implemented as appropriate)     Description:  Goals to be met by: 2017     Patient will increase functional independence with mobility by performin. Supine to sit with Stand-by Assistance  2. Sit to supine with Stand-by Assistance  3. Sit to stand transfer with Stand-by Assistance  4. Bed to chair transfer with Stand-by Assistance using Rolling Walker  5. Gait  x 200 feet with Stand-by Assistance using Rolling Walker.   6. Wheelchair propulsion x 200 feet with Modified [place] using bilateral uppper extremities  7. Ascend/descend at least 4 stair with left Handrail Contact Guard Assistance.                      PLAN:    Patient to be seen daily  to address the above listed problems via gait training, therapeutic activities, therapeutic exercises, therapeutic groups, neuromuscular re-education  Plan of Care expires: 17  Plan of Care reviewed with: patient         Celia Jett, PTA 2017

## 2017-08-12 NOTE — PLAN OF CARE
Problem: Patient Care Overview  Goal: Plan of Care Review  Outcome: Ongoing (interventions implemented as appropriate)  Second unit of blood transfused without incident. Pain is well controlled, slept only fair. No new incident or issue.   Safety maintained.

## 2017-08-12 NOTE — PROGRESS NOTES
08/12/17 0913   PRE-TX-O2-ETCO2   O2 Device (Oxygen Therapy) room air   SpO2 (!) 92 %   Pulse Oximetry Type Intermittent   $ Pulse Oximetry - Multiple Charge Pulse Oximetry - Multiple   Pulse (!) 55   Resp 20

## 2017-08-12 NOTE — PLAN OF CARE
Problem: Fall Risk (Adult)  Goal: Absence of Falls  Patient will demonstrate the desired outcomes by discharge/transition of care.   Outcome: Ongoing (interventions implemented as appropriate)  No current incident of injury or fall.

## 2017-08-12 NOTE — PROGRESS NOTES
[facility]  Physical Medicine & Rehab  Progress Note    Patient Name: Saman Crockett  MRN: [id number]  Patient Class: IP- Rehab   Admission Date: 8/9/2017  Length of Stay: 3 days  Attending Physician: Laura Gutierrez MD  Primary Care Provider: Rajan Munson MD    Subjective:     Principal Problem:  S/p R TIFFANIE  Interval History: pt is 88 y.o male s/p R TIFFANIE,  participating with therapy .    Scheduled Medications:  aspirin  325 mg Oral BID    enalapril  2.5 mg Oral Daily    enoxaparin  40 mg Subcutaneous Daily    escitalopram oxalate  10 mg Oral Daily    ferrous gluconate  324 mg Oral Daily with breakfast    gemfibrozil  600 mg Oral BID AC    glipiZIDE  5 mg Oral Daily with breakfast    insulin detemir  25 Units Subcutaneous QHS    levothyroxine  150 mcg Oral Before breakfast    memantine  28 mg Oral Daily    metformin  750 mg Oral BID WM    multivitamin  1 tablet Oral Daily    tamsulosin  0.4 mg Oral Daily       PRN Medications: sodium chloride, acetaminophen, aluminum-magnesium hydroxide-simethicone, bisacodyl, hydrocodone-acetaminophen 5-325mg, magnesium citrate    Review of Systems   Constitutional: Negative.    HENT: Negative.    Eyes: Negative.    Respiratory: Negative.    Cardiovascular: Negative.    Gastrointestinal: Negative.    Endocrine: Negative.    Genitourinary: Negative.    Musculoskeletal: Negative.    Skin: Negative.    Allergic/Immunologic: Negative.    Neurological: Negative.    Hematological: Negative.    Psychiatric/Behavioral: Negative.      Objective:     Vital Signs (Most Recent):  Temp: 97.5 °F (36.4 °C) (08/12/17 0628)  Pulse: (!) 55 (08/12/17 0913)  Resp: 20 (08/12/17 0913)  BP: (!) 127/53 (08/12/17 0628)  SpO2: (!) 92 % (08/12/17 0913)    Vital Signs (24h Range):  Temp:  [97.3 °F (36.3 °C)-98.9 °F (37.2 °C)] 97.5 °F (36.4 °C)  Pulse:  [55-93] 55  Resp:  [18-20] 20  SpO2:  [92 %-99 %] 92 %  BP: (127-156)/(51-68) 127/53     Physical Exam   Constitutional: He  is oriented to person, place, and time. He appears well-developed and well-nourished. No distress.   HENT:   Head: Normocephalic and atraumatic.   Right Ear: External ear normal.   Left Ear: External ear normal.   Nose: Nose normal.   Mouth/Throat: Oropharynx is clear and moist. No oropharyngeal exudate.   Eyes: Conjunctivae and EOM are normal. Pupils are equal, round, and reactive to light. Right eye exhibits no discharge. Left eye exhibits no discharge. No scleral icterus.   Neck: Normal range of motion. Neck supple. No JVD present. No tracheal deviation present. No thyromegaly present.   Cardiovascular: Normal rate, regular rhythm, normal heart sounds and intact distal pulses.  Exam reveals no gallop and no friction rub.    No murmur heard.  Pulmonary/Chest: Effort normal and breath sounds normal. No stridor. No respiratory distress. He has no wheezes. He has no rales. He exhibits no tenderness.   Abdominal: Soft. Bowel sounds are normal. He exhibits no distension and no mass. There is no tenderness. There is no rebound and no guarding. No hernia.   Genitourinary:   Genitourinary Comments: deferred   Musculoskeletal: Normal range of motion. He exhibits no edema, tenderness or deformity.   Lymphadenopathy:     He has no cervical adenopathy.   Neurological: He is alert and oriented to person, place, and time. He has normal reflexes. He exhibits normal muscle tone.   Skin: No rash noted. He is not diaphoretic. No erythema. No pallor.   Surgical incision stable    Psychiatric: He has a normal mood and affect. His behavior is normal. Judgment and thought content normal.     NEUROLOGICAL EXAMINATION:     MENTAL STATUS   Oriented to person, place, and time.     CRANIAL NERVES     CN III, IV, VI   Pupils are equal, round, and reactive to light.  Extraocular motions are normal.       Assessment/Plan:      Saman Crockett is a 88 y.o. male admitted to inpatient rehabilitation on 8/9/2017 for <principal problem not  specified> with impaired mobility and ADLs. Patient remains appropriate for PT, OT, and as required Speech therapy. Patient continues to require 24 hour nursing care as well as daily Physician assessment.    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] 08/09/2017 Not Applicable      Problems Resolved During this Admission:    Diagnosis Date Noted Date Resolved POA   s/p  R TIFFANIE, cont PT, OT  Pain, managed with current meds  DVT prophylaxis, cont sq Lovenox   HTN, vitals noted, cont current meds  DM, accu check noted, cont current meds      DISCHARGE PLANNING:  Tentative Discharge Date:     Future Appointments  Date Time Provider Department Center   8/30/2017 1:00 PM Rajan Munson MD [facility] [address]       Laura Gutierrez MD  Department of Physical Medicine & Rehab  [facility]

## 2017-08-12 NOTE — PLAN OF CARE
Problem: SLP Goal  Goal: SLP Goal  Long term goals:  1. Pt will use appropriate memory strategies to schedule and recall weekly activities, express needs and recall names to maintain safety and participate socially in functional living environment.     Short term goals:  1. Pt will demonstrate use of memory strategies with 80% acc given verbal and/or visual cues with 80% acc following review.  2. Pt will recall a 3 part functional message related to ADL's with min verbal and visual assistance immediately after review with 80% acc.  3. Pt will recall daily activities via retelling/answering questions with 80% acc given min verbal and visual assistance   4. Pt will use external memory aids and compensatory strategies to recall routine, personal information, and recent events to improve orientation to time & recall daily events with 80% acc and min verbal and/or visual cues.   5. Pt will participate in ongoing assessment of reasoning, problem solving and safety awareness skills    Outcome: Ongoing (interventions implemented as appropriate)  1. Intro to memory strategies, recalled 2 facts from paragraph immediately after it was read to him; recalled 1 fact after 1 min filled delay given SBA  2 Recalled 3 steps to avoid falls immediately after presentation, recalled 1 step after 1 min filled delay  3 mod-max A to recall any activities in earlier PT/OT tx or nurse's name.  After review & filled delay, looked at board for RN's name, recalled he walked & rolled w/c in earlier tx.  5. Max A for any recall of safety precautions.

## 2017-08-12 NOTE — PLAN OF CARE
Problem: Physical Therapy Goal  Goal: Physical Therapy Goal  Goals to be met by: 2017     Patient will increase functional independence with mobility by performin. Supine to sit with Stand-by Assistance  2. Sit to supine with Stand-by Assistance  3. Sit to stand transfer with Stand-by Assistance  4. Bed to chair transfer with Stand-by Assistance using Rolling Walker  5. Gait  x 200 feet with Stand-by Assistance using Rolling Walker.   6. Wheelchair propulsion x 200 feet with Modified [place] using bilateral uppper extremities  7. Ascend/descend at least 4 stair with left Handrail Contact Guard Assistance.     Outcome: Ongoing (interventions implemented as appropriate)  Pt progressing in PT

## 2017-08-12 NOTE — PLAN OF CARE
Problem: Occupational Therapy Goal  Goal: Occupational Therapy Goal  Goals to be met by: 8/24/17     Patient will increase functional independence with ADLs by performing:    Feeding with [place].  UE Dressing with [place].  LE Dressing with Modified [place].  Grooming while seated with [place].  Toileting from toilet with Modified [place] for hygiene and clothing management.   Bathing from shower chair/bench with Supervision.  Shower transfer with Supervision.  Toilet transfer to toilet with Modified [place].     Outcome: Ongoing (interventions implemented as appropriate)  Goals remain appropriate

## 2017-08-12 NOTE — PLAN OF CARE
Overall Plan of Care      Saman Crockett   MRN: [id number]  Admit Date/Time: 8/9/2017  9:04 PM   Admitting Diagnosis: s/p R TIFFANIE    Estimated Length of Stay (days): 14 days     1.  Medical Prognosis:     I have reviewed each team member's Treatment Plan and the current list of barriers/impairments and limitations for this patient.  I approve the goals and recommendations outlined in the Transdisciplinary Plan of Care     1. Assessment:      Medical Prognosis       [ x ]  Improvement expected in admitting condition, with associated             Functional improvement       [   ] Medical condition is chronic or deteriorating, but the patient          Should have functional improvement       [   ]  Other:        Medical/Nursing Interventions:    [x  ]  Fall Prevention Program                                 [x  ]  Adequate Nutrition/Hydration    [x  ]  Monitor Skin Condition                                   [ x ]  Adaptive Equipment    [x  ]  Implement Rehab Therapy Goals                  [ x ]  Bowel and Bladder Program    [ x ]  Monitor Surgical Site Healing                         [  ]  Manage Swallowing disorder    [  ]  Manage Swallowing Disorder                         [  x]  Pain Management    [x  ]  Effectiveness of Medications                          [x ]  Patient / Family Education    [x  ]  Manage Risk of UTI ([facility])                   [ x ]  Diabetic Teaching, Blood Sugar                                                                                      Checks / Insulin Administration    [  ]  Peg Tube Feeds                                             [ x ]  Frequent Vitals/Neuro                                                                                       Assessments and/or Changes     [  ] Trach Care/Education           [  ]  Ostomy Care/Education      Therapy Plan:    Physical Therapy:  Intensity (hrs/day): 1-1.5  Frequency (day/wk:) 7  Estimated Discharge Date: 08/25/17     PT Treatment  Plan:  Plan: gait training, therapeutic activities, therapeutic exercises, therapeutic groups, neuromuscular re-education      Occupational Therapy:  Intensity (hrs/day): 1-1.5  Frequency (day/wk:) 7  Estimated Discharge Date:  08/24/17    OT Treatment Plan:  OT Plan: self-care/home management, community/work re-entry, therapeutic activities, therapeutic exercises, therapeutic groups, wheelchair management/training    SLP:  Intensity (hrs/day): 1-1.5  Frequency (day/wk:) 5  Estimated Discharge Date: 08/24/17    SLP Treatment Plan:  SLP Plan: Therapy Frequency: 5 x/week      Therapy Recommendations:    Therapy Discharge Recommendations: home     Therapy Equipment Recommendations:  (tbd)          Anticipated Functional Outcome:    [  ]  Independent    [ x ]  Modified Independent    [  ]  Requiring Supervision / Assistance      Discharge Planning:    Discharge Disposition:    [  ]  Home with Home Health    [ x ]  Home with Oupatient    [  ]  Assisted Living Facility      Team Goal:    To enable the patient's safe return to the home or a community based environment upon discharge.       Laura Gutierrez MD  08/12/2017  11:15 AM

## 2017-08-12 NOTE — PROGRESS NOTES
08/11/17 2100   PRE-TX-O2-ETCO2   O2 Device (Oxygen Therapy) room air   SpO2 95 %   Pulse Oximetry Type Intermittent   Pulse 62   Resp 18

## 2017-08-12 NOTE — PT/OT/SLP PROGRESS
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    Pain/Comfort  Pain Rating 1: 0/10    Objective:  Patient found with: elena catheter, peripheral IV    Functional Mobility:  Bed Mobility:   Supine to sit: Standby Assistance   Sit to supine: Standby Assistance     Transfer Training:  Sit to stand:Minimal Assistance with Rolling Walker verbal instruction for proper hand placement as pt attempts to pull on walker to stand  Bed <> Chair:  Stand Pivot with Contact Guard Assistance with Rolling Walker from bed with RW and to bed with no assistive device  Toilet Transfer:  Pt Stand Pivot with Stand By Assistance with Rolling Walker pt demonstrates correct transfer technique  >> pt ambulating with CGA/SBA from EOB > toilet > sink in bathroom > EOB with verbal cues for safety awareness and no LOB     Feeding:  Set-up of sandwich and milk carton     Grooming:  SBA standing sink side with RW with no LOB    Toilet Training:  Pt performed toileting with Stand-by Assistance with RW and bedside commode positioned over toilet     Additional Treatment:  - Wheelchair propulsion > 50 ft with Supervision  - UBE x 5 mins with no rest breaks    Patient left HOB elevated with all lines intact, call button in reach, bed alarm on, nurse notified and set up of lunch tray    ASSESSMENT:  Saman Crockett is a 88 y.o. male with a medical diagnosis of femur fx with displacement s/p R hip  arthroplasty and presents with daily progress towards OT goals as pt performing toileting tasks with SBA and grooming in standing with SBA. Patient should continue to benefit from skilled OT services per est POC to increase independence with I/ADL, mobility, and safety.     Rehab potential is good    Activity tolerance: Good    Discharge recommendations: home     Equipment recommendations:  (tbd)     GOALS:    Occupational Therapy Goals        Problem: Occupational Therapy Goal    Goal Priority Disciplines Outcome Interventions   Occupational Therapy Goal     OT, PT/OT Ongoing (interventions implemented as appropriate)    Description:  Goals to be met by: 8/24/17     Patient will increase functional independence with ADLs by performing:    Feeding with [place].  UE Dressing with [place].  LE Dressing with Modified [place].  Grooming while seated with [place].  Toileting from toilet with Modified [place] for hygiene and clothing management.   Bathing from shower chair/bench with Supervision.  Shower transfer with Supervision.  Toilet transfer to toilet with Modified [place].                      Plan:  Patient to be seen 6 x/week to address the above listed problems via self-care/home management, community/work re-entry, therapeutic activities, therapeutic exercises, therapeutic groups, wheelchair management/training  Plan of Care expires: 08/24/17  Plan of Care reviewed with: patient    CHEL Prince LOTR  08/12/2017

## 2017-08-12 NOTE — PT/OT/SLP PROGRESS
"Speech Language Pathology Treatment          Saman Crockett   MRN: [id number]     Diet recommendations: Solid Diet Level: Regular  Liquid Diet Level: Thin     SLP Treatment Date: 08/12/17  Speech Start Time: 1338     Speech Stop Time: 1445     Speech Total (min): 67 min       TREATMENT BILLABLE MINUTES:  Speech Therapy Individual 60 actual tx minutes    General Precautions: Standard, fall, hearing impaired          Subjective:  My memory is awful    Objective:   Patient found in bed, alert & cooperative, no dysarthria.  See [facility] for tx details           Assessment:  Saman Crockett is a 88 y.o. male with a SLP diagnosis of Cognitive-Linguistic Impairment. He presented with moderate effort to complete tasks, using "yes,but..." as an excuse for not accurately completing any task.  Cont POC      Discharge recommendations: Discharge Facility/Level Of Care Needs: home     Goals:    SLP Goals        Problem: SLP Goal    Goal Priority Disciplines Outcome   SLP Goal     SLP Ongoing (interventions implemented as appropriate)   Description:  Long term goals:  1. Pt will use appropriate memory strategies to schedule and recall weekly activities, express needs and recall names to maintain safety and participate socially in functional living environment.     Short term goals:  1. Pt will demonstrate use of memory strategies with 80% acc given verbal and/or visual cues with 80% acc following review.  2. Pt will recall a 3 part functional message related to ADL's with min verbal and visual assistance immediately after review with 80% acc.  3. Pt will recall daily activities via retelling/answering questions with 80% acc given min verbal and visual assistance   4. Pt will use external memory aids and compensatory strategies to recall routine, personal information, and recent events to improve orientation to time & recall daily events with 80% acc and min verbal and/or visual cues.   5. Pt will participate in ongoing assessment " of reasoning, problem solving and safety awareness skills                      Plan:   Patient to be seen Therapy Frequency: 5 x/week   Plan of Care Expires: 08/24/17  Plan of Care reviewed with: patient  SLP Follow-up?: Yes  SLP - Next Visit Date: 08/13/17           Tati Murphy CCC-SLP/A 8/12/2017

## 2017-08-13 LAB
ERYTHROCYTE [DISTWIDTH] IN BLOOD BY AUTOMATED COUNT: 14.4 %
HCT VFR BLD AUTO: 29.4 %
HGB BLD-MCNC: 10.1 G/DL
MCH RBC QN AUTO: 31.2 PG
MCHC RBC AUTO-ENTMCNC: 34.2 G/DL
MCV RBC AUTO: 91 FL
PLATELET # BLD AUTO: 384 K/UL
PLATELET BLD QL SMEAR: NORMAL
PMV BLD AUTO: 6.7 FL
POCT GLUCOSE: 130 MG/DL (ref 70–110)
POCT GLUCOSE: 199 MG/DL (ref 70–110)
POCT GLUCOSE: 264 MG/DL (ref 70–110)
POCT GLUCOSE: 98 MG/DL (ref 70–110)
RBC # BLD AUTO: 3.23 M/UL
WBC # BLD AUTO: 11.1 K/UL

## 2017-08-13 PROCEDURE — 94761 N-INVAS EAR/PLS OXIMETRY MLT: CPT

## 2017-08-13 PROCEDURE — 97535 SELF CARE MNGMENT TRAINING: CPT

## 2017-08-13 PROCEDURE — 25000003 PHARM REV CODE 250: Performed by: PHYSICAL MEDICINE & REHABILITATION

## 2017-08-13 PROCEDURE — 97110 THERAPEUTIC EXERCISES: CPT

## 2017-08-13 PROCEDURE — 63600175 PHARM REV CODE 636 W HCPCS: Performed by: PHYSICAL MEDICINE & REHABILITATION

## 2017-08-13 PROCEDURE — 85027 COMPLETE CBC AUTOMATED: CPT

## 2017-08-13 PROCEDURE — 36415 COLL VENOUS BLD VENIPUNCTURE: CPT

## 2017-08-13 PROCEDURE — 97530 THERAPEUTIC ACTIVITIES: CPT

## 2017-08-13 PROCEDURE — 12800000 HC REHAB SEMI-PRIVATE ROOM

## 2017-08-13 PROCEDURE — 97532 *HC SP COG SKL DEV EA 15 MIN: CPT

## 2017-08-13 RX ADMIN — METFORMIN HYDROCHLORIDE 750 MG: 750 TABLET, EXTENDED RELEASE ORAL at 04:08

## 2017-08-13 RX ADMIN — METFORMIN HYDROCHLORIDE 750 MG: 750 TABLET, EXTENDED RELEASE ORAL at 08:08

## 2017-08-13 RX ADMIN — GEMFIBROZIL 600 MG: 600 TABLET ORAL at 04:08

## 2017-08-13 RX ADMIN — HYDROCODONE BITARTRATE AND ACETAMINOPHEN 2 TABLET: 5; 325 TABLET ORAL at 08:08

## 2017-08-13 RX ADMIN — ASPIRIN 325 MG ORAL TABLET 325 MG: 325 PILL ORAL at 08:08

## 2017-08-13 RX ADMIN — MEMANTINE HYDROCHLORIDE 28 MG: 28 CAPSULE, EXTENDED RELEASE ORAL at 08:08

## 2017-08-13 RX ADMIN — GEMFIBROZIL 600 MG: 600 TABLET ORAL at 05:08

## 2017-08-13 RX ADMIN — ENALAPRIL MALEATE 2.5 MG: 2.5 TABLET ORAL at 08:08

## 2017-08-13 RX ADMIN — GLIPIZIDE 5 MG: 5 TABLET, FILM COATED, EXTENDED RELEASE ORAL at 08:08

## 2017-08-13 RX ADMIN — TAMSULOSIN HYDROCHLORIDE 0.4 MG: 0.4 CAPSULE ORAL at 08:08

## 2017-08-13 RX ADMIN — HYDROCODONE BITARTRATE AND ACETAMINOPHEN 2 TABLET: 5; 325 TABLET ORAL at 10:08

## 2017-08-13 RX ADMIN — ESCITALOPRAM 10 MG: 10 TABLET, FILM COATED ORAL at 08:08

## 2017-08-13 RX ADMIN — HYDROCODONE BITARTRATE AND ACETAMINOPHEN 2 TABLET: 5; 325 TABLET ORAL at 01:08

## 2017-08-13 RX ADMIN — FERROUS GLUCONATE 324 MG: 324 TABLET ORAL at 08:08

## 2017-08-13 RX ADMIN — ENOXAPARIN SODIUM 40 MG: 100 INJECTION SUBCUTANEOUS at 04:08

## 2017-08-13 RX ADMIN — LEVOTHYROXINE SODIUM 150 MCG: 150 TABLET ORAL at 05:08

## 2017-08-13 RX ADMIN — THERA TABS 1 TABLET: TAB at 08:08

## 2017-08-13 NOTE — PLAN OF CARE
Problem: Patient Care Overview  Goal: Plan of Care Review  Outcome: Ongoing (interventions implemented as appropriate)  Awake, alert and oriented to self and place. Medicated for pain x 1. Forgetful and difficulty sleeping. Ambulates with walker to bathroom for toileting. Safety ongoing with alarms in use.

## 2017-08-13 NOTE — PLAN OF CARE
Problem: Patient Care Overview  Goal: Plan of Care Review  Plan of Care Reviewed    Comments: Patient is alert and oriented to person and place. Bed and chair alarm used. Patient is forgetful and education needs to be reinforced about usage of the call light. Patient remained free from falls. Standard precautions maintained.

## 2017-08-13 NOTE — PT/OT/SLP PROGRESS
Occupational Therapy  Treatment    Saman Crockett   MRN: [id number]     OT Date of Treatment: 08/13/17   Total Time (min): 60 min      Billable Minutes:  Self Care/Home Management 60  Total Minutes: 60    General Precautions: Standard, fall, hearing impaired  Orthopedic Precautions: RLE weight bearing as tolerated, RLE anterior precautions    Do you have any cultural, spiritual, Mandaen conflicts, given your current situation?: None    Subjective:  Communicated with nurse prior to session.    Pain/Comfort  Pain Rating 1: 0/10    Objective:  Patient found with: elena catheter    Functional Mobility:  Bed Mobility:   Supine to sit: Standby Assistance   Sit to supine: Standby Assistance      Transfer Training:  Bed <> chair: stand pivot with CGA with no assistance device    Feeding:  Set up A with sandwich    Grooming:  Patient performed hand washing with standby assistance sitting at sink.  Patient performed hair grooming with standby assistance sitting at sink.          Patient left HOB elevated with call button in reach and bed alarm on    ASSESSMENT:  Saman Crockett is a 88 y.o. male and presents with progress towards goals. He would benefit from continued skilled occupational therapy services to improve independence, mobility and safety.    Rehab potential is good    Activity tolerance: good          GOALS:    Occupational Therapy Goals        Problem: Occupational Therapy Goal    Goal Priority Disciplines Outcome Interventions   Occupational Therapy Goal     OT, PT/OT Ongoing (interventions implemented as appropriate)    Description:  Goals to be met by: 8/24/17     Patient will increase functional independence with ADLs by performing:    Feeding with [place].  UE Dressing with [place].  LE Dressing with Modified [place].  Grooming while seated with [place].  Toileting from toilet with Modified [place] for hygiene and clothing management.   Bathing from shower chair/bench with  Supervision.  Shower transfer with Supervision.  Toilet transfer to toilet with Modified [place].                      Plan:  Patient to be seen 6 x/week to address the above listed problems via self-care/home management, community/work re-entry, therapeutic activities, therapeutic exercises, therapeutic groups, wheelchair management/training  Plan of Care expires: 08/24/17  Plan of Care reviewed with: patient      Tena BRAYDEN Pitts    8/13/2017

## 2017-08-13 NOTE — PROGRESS NOTES
08/12/17 1933   PRE-TX-O2-ETCO2   O2 Device (Oxygen Therapy) room air   SpO2 98 %   Pulse Oximetry Type Intermittent   Pulse 70

## 2017-08-13 NOTE — PT/OT/SLP PROGRESS
"Speech Language Pathology Treatment          Saman Crockett   MRN: [id number]     Diet recommendations: Solid Diet Level: Regular  Liquid Diet Level: Thin     SLP Treatment Date: 17  Speech Start Time: 134     Speech Stop Time: 1442     Speech Total (min): 60 min       TREATMENT BILLABLE MINUTES:  Speech Therapy Individual 60 and Total Time 60    General Precautions: Standard, fall, hearing impaired          Subjective:  In bed visiting with multiple family members. Agreed to therapy.   "My memory is shot."    Objective:    Patient found with: bed alarm, elena catheter  1. Pt will demonstrate use of memory strategies with 80% acc given verbal and/or visual cues with 80% acc following review.  2. Pt will recall a 3 part functional message related to ADL's with min verbal and visual assistance immediately after review with 80% acc.  4. Pt will use external memory aids and compensatory strategies to recall routine, personal information, and recent events to improve orientation to time & recall daily events with 80% acc and min verbal and/or visual cues.     Pain/Comfort  Pain Rating 1: 0/10    Assessment:  Saman Crockett is a 88 y.o. male with a SLP diagnosis of Cognitive Impairment. He present with improved orientation. Consistently oriented to time, year, place and situation. Required constant verbal cues for use of visual cue (calendar) to accurately recall month and date. He answered Y/N Q related to orientation with 100% accuracy. Recalled personal information (, age, address, phone #, children dn spouse names) independently. Required MOD/MAX A to recall daily activities, therapies and meal items. Immediately recalled 3 details from message presented verbally with 83% accuracy given MIN A; 2 details with 100% accuracy independently. He demonstrated impulsivity when transferring to and from w/c to bed. Risk for fall. Ensure bed and w/c alarm engaged at all times.     Discharge recommendations: Discharge " Facility/Level Of Care Needs: home     Goals:    SLP Goals        Problem: SLP Goal    Goal Priority Disciplines Outcome   SLP Goal     SLP Ongoing (interventions implemented as appropriate)   Description:  Long term goals:  1. Pt will use appropriate memory strategies to schedule and recall weekly activities, express needs and recall names to maintain safety and participate socially in functional living environment.     Short term goals:  1. Pt will demonstrate use of memory strategies with 80% acc given verbal and/or visual cues with 80% acc following review.  2. Pt will recall a 3 part functional message related to ADL's with min verbal and visual assistance immediately after review with 80% acc.  3. Pt will recall daily activities via retelling/answering questions with 80% acc given min verbal and visual assistance   4. Pt will use external memory aids and compensatory strategies to recall routine, personal information, and recent events to improve orientation to time & recall daily events with 80% acc and min verbal and/or visual cues.   5. Pt will participate in ongoing assessment of reasoning, problem solving and safety awareness skills                      Plan: Cont POC  Patient to be seen Therapy Frequency: 5 x/week   Plan of Care Expires: 08/24/17  Plan of Care reviewed with: patient, spouse  SLP Follow-up?: Yes  SLP - Next Visit Date: 08/14/17           Sharri Stack CCC-SLP 8/13/2017

## 2017-08-13 NOTE — PLAN OF CARE
Problem: SLP Goal  Goal: SLP Goal  Long term goals:  1. Pt will use appropriate memory strategies to schedule and recall weekly activities, express needs and recall names to maintain safety and participate socially in functional living environment.     Short term goals:  1. Pt will demonstrate use of memory strategies with 80% acc given verbal and/or visual cues with 80% acc following review.  2. Pt will recall a 3 part functional message related to ADL's with min verbal and visual assistance immediately after review with 80% acc.  3. Pt will recall daily activities via retelling/answering questions with 80% acc given min verbal and visual assistance   4. Pt will use external memory aids and compensatory strategies to recall routine, personal information, and recent events to improve orientation to time & recall daily events with 80% acc and min verbal and/or visual cues.   5. Pt will participate in ongoing assessment of reasoning, problem solving and safety awareness skills    Outcome: Ongoing (interventions implemented as appropriate)  Saman Crockett is a 88 y.o. male with a SLP diagnosis of Cognitive Impairment. He present with improved orientation. Consistently oriented to time, year, place and situation. Required constant verbal cues for use of visual cue (calendar) to accurately recall month and date. He answered Y/N Q related to orientation with 100% accuracy. Recalled personal information (, age, address, phone #, children dn spouse names) independently. Required MOD/MAX A to recall daily activities, therapies and meal items. Immediately recalled 3 details from message presented verbally with 83% accuracy given MIN A; 2 details with 100% accuracy independently. He demonstrated impulsivity when transferring to and from w/c to bed. Risk for fall. Ensure bed and w/c alarm engaged at all times.     Sharri Stack MS, CCC/SLP 2017

## 2017-08-13 NOTE — PT/OT/SLP PROGRESS
Physical Therapy         Treatment        Saman Crockett   MRN: [id number]     PT Received On: 17  Total Time (min): 60        Billable Minutes:  Therapeutic Activity 20 and Therapeutic Exercise 40  Total Minutes: 60    Treatment Type: Treatment  PT/PTA: PT     PTA Visit Number: 0       General Precautions: Standard, fall, hearing impaired  Orthopedic Precautions: Orthopedic Precautions : RLE weight bearing as tolerated, RLE anterior precautions        Subjective:  Pain/Comfort  Pain Rating 1: 310  Location - Side 1: Right  Location 1: hip  Pain Addressed 1: Nurse notified, Cessation of Activity, Distraction, Reposition  Pain Rating Post-Intervention 1: 3/10    Objective:  Patient found supine in bed, in NAD.       Functional Mobility:  Bed Mobility:   Supine to sit: Minimal Assistance     Transfer Training:  Sit to stand:Minimal Assistance with Rolling Walker  x 8  Bed <> Chair:  Stand Pivot with Contact Guard Assistance with Rolling Walker      Wheelchair Trainin' with SBA    Gait Trainin' x 3 with RW with CGA and cues    Additional Treatment:  SEATED: LAQ and hip flexion (L with 3#), hip abduction with yellow tband, hip adduction with ball, ankle DF/PF, knee flexion with red tband, x 20 reps each  SUPINE: SLR (R with assist), ankle DF, x 10 reps each (ankle x 3 sets)  STANDING: with RW: ankle DF, ankle PF, minisquats, x 20 reps each       Activity Tolerance:  Patient tolerated treatment well    Patient left up in chair with call button in reach and chair alarm on.    Assessment:  Saman Crockett is a 88 y.o. male     Rehab potential is good.    Activity tolerance: Fair    GOALS:    Physical Therapy Goals        Problem: Physical Therapy Goal    Goal Priority Disciplines Outcome Goal Variances Interventions   Physical Therapy Goal     PT/OT, PT Ongoing (interventions implemented as appropriate)     Description:  Goals to be met by: 2017     Patient will increase functional independence with  mobility by performin. Supine to sit with Stand-by Assistance  2. Sit to supine with Stand-by Assistance  3. Sit to stand transfer with Stand-by Assistance  4. Bed to chair transfer with Stand-by Assistance using Rolling Walker  5. Gait  x 200 feet with Stand-by Assistance using Rolling Walker.   6. Wheelchair propulsion x 200 feet with Modified [place] using bilateral uppper extremities  7. Ascend/descend at least 4 stair with left Handrail Contact Guard Assistance.                      PLAN:    Patient to be seen daily  to address the above listed problems via gait training, therapeutic activities, therapeutic exercises, neuromuscular re-education, wheelchair management/training  Plan of Care expires: 17  Plan of Care reviewed with: patient         Jeffrey ZUNIGA Katherine, PT 2017

## 2017-08-13 NOTE — PROGRESS NOTES
[facility]  Physical Medicine & Rehab  Progress Note    Patient Name: Saman Crockett  MRN: [id number]  Patient Class: IP- Rehab   Admission Date: 8/9/2017  Length of Stay: 4 days  Attending Physician: Laura Gutierrez MD  Primary Care Provider: Rajan Munson MD    Subjective:     Principal Problem:  S/p R TIFFANIE  Interval History: pt is 88 y.o male s/p R TIFFANIE, participating with therapy    Scheduled Medications:  aspirin  325 mg Oral BID    enalapril  2.5 mg Oral Daily    enoxaparin  40 mg Subcutaneous Daily    escitalopram oxalate  10 mg Oral Daily    ferrous gluconate  324 mg Oral Daily with breakfast    gemfibrozil  600 mg Oral BID AC    glipiZIDE  5 mg Oral Daily with breakfast    insulin detemir  25 Units Subcutaneous QHS    levothyroxine  150 mcg Oral Before breakfast    memantine  28 mg Oral Daily    metformin  750 mg Oral BID WM    multivitamin  1 tablet Oral Daily    tamsulosin  0.4 mg Oral Daily       PRN Medications: sodium chloride, acetaminophen, aluminum-magnesium hydroxide-simethicone, bisacodyl, hydrocodone-acetaminophen 5-325mg, magnesium citrate    Review of Systems   Constitutional: Negative.    HENT: Negative.    Eyes: Negative.    Respiratory: Negative.    Cardiovascular: Negative.    Gastrointestinal: Negative.    Endocrine: Negative.    Genitourinary: Negative.    Musculoskeletal: Negative.    Skin: Negative.    Allergic/Immunologic: Negative.    Neurological: Negative.    Hematological: Negative.    Psychiatric/Behavioral: Negative.      Objective:     Vital Signs (Most Recent):  Temp: 97.6 °F (36.4 °C) (08/13/17 0710)  Pulse: 62 (08/13/17 0710)  Resp: 16 (08/13/17 0710)  BP: (!) 168/66 (08/13/17 0710)  SpO2: 100 % (08/13/17 0710)    Vital Signs (24h Range):  Temp:  [97.6 °F (36.4 °C)-98.5 °F (36.9 °C)] 97.6 °F (36.4 °C)  Pulse:  [58-70] 62  Resp:  [14-20] 16  SpO2:  [93 %-100 %] 100 %  BP: (144-177)/(58-68) 168/66     Physical Exam   Constitutional: He is  oriented to person, place, and time. He appears well-developed and well-nourished. No distress.   HENT:   Head: Normocephalic and atraumatic.   Right Ear: External ear normal.   Left Ear: External ear normal.   Nose: Nose normal.   Mouth/Throat: No oropharyngeal exudate.   Eyes: Conjunctivae and EOM are normal. Pupils are equal, round, and reactive to light. Right eye exhibits no discharge. Left eye exhibits no discharge. No scleral icterus.   Neck: Normal range of motion. Neck supple. No JVD present. No tracheal deviation present. No thyromegaly present.   Cardiovascular: Normal rate, regular rhythm, normal heart sounds and intact distal pulses.  Exam reveals no gallop and no friction rub.    No murmur heard.  Pulmonary/Chest: Effort normal and breath sounds normal. No stridor. No respiratory distress. He has no wheezes. He has no rales. He exhibits no tenderness.   Abdominal: Soft. Bowel sounds are normal. He exhibits no distension and no mass. There is no tenderness. There is no rebound and no guarding. No hernia.   Genitourinary:   Genitourinary Comments: deferred   Musculoskeletal: Normal range of motion. He exhibits tenderness ( right hip typical post op ). He exhibits no edema or deformity.   Lymphadenopathy:     He has no cervical adenopathy.   Neurological: He is alert and oriented to person, place, and time. He has normal reflexes. He exhibits normal muscle tone.   Skin: No rash noted. He is not diaphoretic. No erythema. No pallor.   Incision stable    Psychiatric: He has a normal mood and affect. His behavior is normal. Judgment and thought content normal.     NEUROLOGICAL EXAMINATION:     MENTAL STATUS   Oriented to person, place, and time.     CRANIAL NERVES     CN III, IV, VI   Pupils are equal, round, and reactive to light.  Extraocular motions are normal.       Assessment/Plan:      Saman Crockett is a 88 y.o. male admitted to inpatient rehabilitation on 8/9/2017 for <principal problem not  specified> with impaired mobility and ADLs. Patient remains appropriate for PT, OT, and as required Speech therapy. Patient continues to require 24 hour nursing care as well as daily Physician assessment.    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] 08/09/2017 Not Applicable      Problems Resolved During this Admission:    Diagnosis Date Noted Date Resolved POA     S/p R TIFFANIE, cont PT, OT  Pain, managed with current meds  DVT prophylaxis , cont sq Lovenox   Anemia, s/p transfusion, CBC pending  HTN, vitals noted, cont current meds  Diabeties, cont current management     DISCHARGE PLANNING:  Tentative Discharge Date:     Future Appointments  Date Time Provider Department Center   8/30/2017 1:00 PM Rajan Munson MD [facility] [address]       Laura Gutierrez MD  Department of Physical Medicine & Rehab  [facility]

## 2017-08-14 LAB
POCT GLUCOSE: 112 MG/DL (ref 70–110)
POCT GLUCOSE: 195 MG/DL (ref 70–110)
POCT GLUCOSE: 46 MG/DL (ref 70–110)
POCT GLUCOSE: 53 MG/DL (ref 70–110)
POCT GLUCOSE: 73 MG/DL (ref 70–110)

## 2017-08-14 PROCEDURE — 97530 THERAPEUTIC ACTIVITIES: CPT

## 2017-08-14 PROCEDURE — 97116 GAIT TRAINING THERAPY: CPT

## 2017-08-14 PROCEDURE — 97110 THERAPEUTIC EXERCISES: CPT

## 2017-08-14 PROCEDURE — 25000003 PHARM REV CODE 250: Performed by: PHYSICAL MEDICINE & REHABILITATION

## 2017-08-14 PROCEDURE — 63600175 PHARM REV CODE 636 W HCPCS: Performed by: PHYSICAL MEDICINE & REHABILITATION

## 2017-08-14 PROCEDURE — 12800000 HC REHAB SEMI-PRIVATE ROOM

## 2017-08-14 PROCEDURE — 97542 WHEELCHAIR MNGMENT TRAINING: CPT

## 2017-08-14 PROCEDURE — 97535 SELF CARE MNGMENT TRAINING: CPT

## 2017-08-14 PROCEDURE — 94761 N-INVAS EAR/PLS OXIMETRY MLT: CPT

## 2017-08-14 RX ADMIN — ASPIRIN 325 MG ORAL TABLET 325 MG: 325 PILL ORAL at 08:08

## 2017-08-14 RX ADMIN — TAMSULOSIN HYDROCHLORIDE 0.4 MG: 0.4 CAPSULE ORAL at 08:08

## 2017-08-14 RX ADMIN — GEMFIBROZIL 600 MG: 600 TABLET ORAL at 05:08

## 2017-08-14 RX ADMIN — MEMANTINE HYDROCHLORIDE 28 MG: 28 CAPSULE, EXTENDED RELEASE ORAL at 09:08

## 2017-08-14 RX ADMIN — METFORMIN HYDROCHLORIDE 750 MG: 750 TABLET, EXTENDED RELEASE ORAL at 05:08

## 2017-08-14 RX ADMIN — THERA TABS 1 TABLET: TAB at 08:08

## 2017-08-14 RX ADMIN — FERROUS GLUCONATE 324 MG: 324 TABLET ORAL at 08:08

## 2017-08-14 RX ADMIN — GLIPIZIDE 5 MG: 5 TABLET, FILM COATED, EXTENDED RELEASE ORAL at 07:08

## 2017-08-14 RX ADMIN — ENALAPRIL MALEATE 2.5 MG: 2.5 TABLET ORAL at 08:08

## 2017-08-14 RX ADMIN — GEMFIBROZIL 600 MG: 600 TABLET ORAL at 06:08

## 2017-08-14 RX ADMIN — ENOXAPARIN SODIUM 40 MG: 100 INJECTION SUBCUTANEOUS at 05:08

## 2017-08-14 RX ADMIN — ESCITALOPRAM 10 MG: 10 TABLET, FILM COATED ORAL at 08:08

## 2017-08-14 RX ADMIN — ACETAMINOPHEN 650 MG: 325 TABLET, FILM COATED ORAL at 08:08

## 2017-08-14 RX ADMIN — METFORMIN HYDROCHLORIDE 750 MG: 750 TABLET, EXTENDED RELEASE ORAL at 07:08

## 2017-08-14 RX ADMIN — LEVOTHYROXINE SODIUM 150 MCG: 150 TABLET ORAL at 06:08

## 2017-08-14 NOTE — PLAN OF CARE
Problem: Patient Care Overview  Goal: Plan of Care Review  Outcome: Ongoing (interventions implemented as appropriate)  AAOx3 incision well approximated and healing tolerating therapy well

## 2017-08-14 NOTE — PLAN OF CARE
Problem: Physical Therapy Goal  Goal: Physical Therapy Goal  Goals to be met by: 2017     Patient will increase functional independence with mobility by performin. Supine to sit with Stand-by Assistance  2. Sit to supine with Stand-by Assistance  3. Sit to stand transfer with Stand-by Assistance  4. Bed to chair transfer with Stand-by Assistance using Rolling Walker  5. Gait  x 200 feet with Stand-by Assistance using Rolling Walker.   6. Wheelchair propulsion x 200 feet with Modified [place] using bilateral uppper extremities  7. Ascend/descend at least 4 stair with left Handrail Contact Guard Assistance.     Outcome: Ongoing (interventions implemented as appropriate)    PT for there ex, activity w/c and gait.

## 2017-08-14 NOTE — PLAN OF CARE
Problem: Occupational Therapy Goal  Goal: Occupational Therapy Goal  Goals to be met by: 8/24/17     Patient will increase functional independence with ADLs by performing:    Feeding with [place].  UE Dressing with [place].  LE Dressing with Modified [place].  Grooming while seated with [place].  Toileting from toilet with Modified [place] for hygiene and clothing management.   Bathing from shower chair/bench with Supervision.  Shower transfer with Supervision.  Toilet transfer to toilet with Modified [place].     Outcome: Ongoing (interventions implemented as appropriate)  OT goals remain appropriate

## 2017-08-14 NOTE — PT/OT/SLP PROGRESS
Occupational Therapy  Treatment    Saman Crockett   MRN: [id number]   Admitting Diagnosis: femur fx with displacement s/p R hip arthroplasty     OT Date of Treatment: 08/14/17   Total Time (min): 70 min    Billable Minutes:  Self Care/Home Management 60 and Therapeutic Exercise 10  Total Minutes: 70    General Precautions: Standard, fall, hearing impaired  Orthopedic Precautions: RLE weight bearing as tolerated, RLE anterior precautions  Braces: N/A    Do you have any cultural, spiritual, Mosque conflicts, given your current situation?: None    Subjective:  Communicated with nurse prior to session.    Pain/Comfort  Pain Rating 1: 0/10 (no pain reported)  Pain Rating Post-Intervention 1: 0/10    Objective:  Patient found with: peripheral IV (R forearm )    Functional Mobility:  Bed Mobility:   Sit to supine: Minimal Assistance    Transfer Training:  Sit to stand:Contact Guard Assistance with Rolling Walker verbal cues for safety - locking w/c brakes  Bed <> Chair:  Stand Pivot with Contact Guard Assistance with Rolling Walker verbal cues for safety   Patient tub bench transfer Stand Pivot with Contact Guard Assistance with Grab bars   >>>> pt ambulating with CGA and RW from wheelchair in hallway > EOB approximately 20 ft with no LOB     Grooming:  Set-up sitting sink side    Bathing:  Patient performed bathing with Minimal Assistance with grab bar, Handheld shower head and tub bench at Shower.   > standing with unilateral UE support of grab bar to wash/rinse/dry rehan area/buttocks with SBA no LOB     UE Dressing:  Patient performed UE Dressing with Supervision or Set-up Assistance with no AE at tub bench prior to shower and w/c after shower.    LE Dressing:  Patient don/doffed socks with Moderate Assistance with use of sock aid - pt provided with and instructed on use of sock aid to increase independence with LB dressing tasks - pt demonstrates use with Mod (A)  Patient don/doffed long pants with Minimal Assistance  - pt able to thread LE with Min (A) to adjust clothing and CGA/SBA in standing to manage clothing over hips     Additional Treatment:  - Wheelchair propulsion > 50 ft with SBA/Min (A)   - UBE x 5 mins with no rest breaks     Patient left HOB elevated with all lines intact, call button in reach, bed alarm on and nurse notified    ASSESSMENT:  Saman Crockett is a 88 y.o. male with a medical diagnosis of femur fx with displacement s/p R hip arthroplasty and presents with progress towards OT goals with self care tasks; however, pt also demonstrates memory deficits requiring frequent verbal cues for safety. Patient should continue to benefit from skilled OT services per est POC to increase independence with self care tasks, functional mobility, and safety.     Rehab potential is good    Activity tolerance: Good    Discharge recommendations: home     Equipment recommendations:  (tbd)     GOALS:    Occupational Therapy Goals        Problem: Occupational Therapy Goal    Goal Priority Disciplines Outcome Interventions   Occupational Therapy Goal     OT, PT/OT Ongoing (interventions implemented as appropriate)    Description:  Goals to be met by: 8/24/17     Patient will increase functional independence with ADLs by performing:    Feeding with [place].  UE Dressing with [place].  LE Dressing with Modified [place].  Grooming while seated with [place].  Toileting from toilet with Modified [place] for hygiene and clothing management.   Bathing from shower chair/bench with Supervision.  Shower transfer with Supervision.  Toilet transfer to toilet with Modified [place].                      Plan:  Patient to be seen 6 x/week to address the above listed problems via self-care/home management, community/work re-entry, therapeutic activities, therapeutic exercises, therapeutic groups, wheelchair management/training  Plan of Care expires: 08/24/17  Plan of Care reviewed with: patient    Chica May  BRAYDEN MILLIGAN    08/14/2017

## 2017-08-14 NOTE — NURSING
Awake and alert and oriented in no distress. Vital signs wnl with blood sugar 46 and no s/s noted. 4oz OJ with carrol/peanut butter given. 0635 blood sugar retaken at 73. Remains alert sitting up in w/c in room.

## 2017-08-14 NOTE — PROGRESS NOTES
[facility]  Physical Medicine & Rehab  Progress Note    Patient Name: Saman Crockett  MRN: [id number]  Patient Class: IP- Rehab   Admission Date: 8/9/2017  Length of Stay: 5 days  Attending Physician: Laura Gutierrez MD  Primary Care Provider: Rajan Munson MD    Subjective:     Principal Problem:  S/p R TIFFANIE  Interval History: pt is 88 y.o male s/p R TIFFANIE,  Participating with therapy     Scheduled Medications:  aspirin  325 mg Oral BID    enalapril  2.5 mg Oral Daily    enoxaparin  40 mg Subcutaneous Daily    escitalopram oxalate  10 mg Oral Daily    ferrous gluconate  324 mg Oral Daily with breakfast    gemfibrozil  600 mg Oral BID AC    glipiZIDE  5 mg Oral Daily with breakfast    insulin detemir  18 Units Subcutaneous QHS    levothyroxine  150 mcg Oral Before breakfast    memantine  28 mg Oral Daily    metformin  750 mg Oral BID WM    multivitamin  1 tablet Oral Daily    tamsulosin  0.4 mg Oral Daily       PRN Medications: sodium chloride, acetaminophen, aluminum-magnesium hydroxide-simethicone, bisacodyl, hydrocodone-acetaminophen 5-325mg, magnesium citrate    Review of Systems   Constitutional: Negative.    HENT: Negative.    Eyes: Negative.    Respiratory: Negative.    Cardiovascular: Negative.    Gastrointestinal: Negative.    Endocrine: Negative.    Genitourinary: Negative.    Musculoskeletal: Negative.    Skin: Negative.    Allergic/Immunologic: Negative.    Neurological: Negative.    Hematological: Negative.    Psychiatric/Behavioral: Negative.      Objective:     Vital Signs (Most Recent):  Temp: 98.1 °F (36.7 °C) (08/14/17 0556)  Pulse: (!) 57 (08/14/17 0701)  Resp: 17 (08/14/17 0701)  BP: (!) 150/61 (08/14/17 0701)  SpO2: 96 % (08/14/17 0701)    Vital Signs (24h Range):  Temp:  [98.1 °F (36.7 °C)-98.7 °F (37.1 °C)] 98.1 °F (36.7 °C)  Pulse:  [57-65] 57  Resp:  [16-18] 17  SpO2:  [96 %-100 %] 96 %  BP: (123-180)/(56-71) 150/61     Physical Exam   Constitutional: He is  oriented to person, place, and time. He appears well-developed and well-nourished. No distress.   HENT:   Head: Normocephalic and atraumatic.   Right Ear: External ear normal.   Left Ear: External ear normal.   Nose: Nose normal.   Mouth/Throat: Oropharynx is clear and moist. No oropharyngeal exudate.   Eyes: Conjunctivae and EOM are normal. Pupils are equal, round, and reactive to light. Right eye exhibits no discharge. Left eye exhibits no discharge. No scleral icterus.   Neck: Normal range of motion. Neck supple. No JVD present. No tracheal deviation present. No thyromegaly present.   Cardiovascular: Normal rate, regular rhythm, normal heart sounds and intact distal pulses.  Exam reveals no gallop and no friction rub.    No murmur heard.  Pulmonary/Chest: Effort normal and breath sounds normal. No stridor. No respiratory distress. He has no wheezes. He has no rales. He exhibits no tenderness.   Abdominal: Soft. Bowel sounds are normal. He exhibits no distension and no mass. There is no tenderness. There is no rebound and no guarding. No hernia.   Genitourinary:   Genitourinary Comments: deferred   Musculoskeletal: Normal range of motion. He exhibits no edema, tenderness or deformity.   Lymphadenopathy:     He has no cervical adenopathy.   Neurological: He is alert and oriented to person, place, and time. He exhibits normal muscle tone.   Skin: No rash noted. He is not diaphoretic. No erythema. No pallor.   Incision healing well    Psychiatric: He has a normal mood and affect. His behavior is normal. Judgment and thought content normal.     NEUROLOGICAL EXAMINATION:     MENTAL STATUS   Oriented to person, place, and time.     CRANIAL NERVES     CN III, IV, VI   Pupils are equal, round, and reactive to light.  Extraocular motions are normal.       Assessment/Plan:      Saman Crockett is a 88 y.o. male admitted to inpatient rehabilitation on 8/9/2017 for <principal problem not specified> with impaired mobility and  ADLs. Patient remains appropriate for PT, OT, and as required Speech therapy. Patient continues to require 24 hour nursing care as well as daily Physician assessment.    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] 08/09/2017 Not Applicable      Problems Resolved During this Admission:    Diagnosis Date Noted Date Resolved POA   s/p R TIFFANIE , cont PT, OT  Pain, managed with current meds  DVT prophylaxis, cont sq Lovenox  DM, accu check noted, dec insulin dose   HTN, vitals noted, dec enalapril dose     DISCHARGE PLANNING:  Tentative Discharge Date:     Future Appointments  Date Time Provider Department Center   8/30/2017 1:00 PM Rajan Munson MD [facility] [address]       Laura Gutierrez MD  Department of Physical Medicine & Rehab  [facility]

## 2017-08-14 NOTE — PLAN OF CARE
Problem: Patient Care Overview  Goal: Plan of Care Review  Outcome: Ongoing (interventions implemented as appropriate)  Awake, alert and oriented to self and place. Impulsive and  forgetful at times.  Medicated for pain x 1. White catheter patent and continent of bowel. Safety ongoing with alarms in use.Reinforcement needed with teaching.

## 2017-08-14 NOTE — PT/OT/SLP PROGRESS
Physical Therapy         Treatment        Saman Crockett   MRN: [id number]     PT Received On: 08/14/17  Total Time (min): 80        Billable Minutes:  Gait [id number], Therapeutic Activity 20, Therapeutic Exercise 35 and Train/Wheelchair Management 15  Total Minutes: 80    Treatment Type: Treatment  PT/PTA: PT     PTA Visit Number: 0       General Precautions: Standard, fall, hearing impaired  Orthopedic Precautions: Orthopedic Precautions : RLE weight bearing as tolerated, RLE anterior precautions   Braces: Braces: N/A         Subjective:  Communicated with nurse Wayne prior to session.    Pain/Comfort  Pain Rating 1: 7/10 (with activity)  Location - Side 1: Right  Location 1: thigh  Pain Addressed 1: Reposition, Distraction, Nurse notified (Nurse brought Tylenol during session.)  Pain Rating Post-Intervention 1: 3/10    Objective:  Patient found sitting up in w/c after breakfast on RA.       Transfer Training:  Sit to stand:Contact Guard Assistance with Rolling Walker      Wheelchair Training:  Pt propelled Standard wheelchair x 200 feet on Level tile with  Bilateral upper extremity with Supervision or Set-up Assistance and Contact Guard Assistance.     Gait Training:  On level tile with Rolling Walker x 90 feet with CGA for safety.  Pt continues to exhibit an antalgic gait pattern due to persistent anterolateral thigh pain, hip to knee.    Additional Treatment:  Pt performed B LE there ex sitting x 30 reps with 2# wt L and red TB, with vc for proper execution and joint protection: ap, laq, marching, hip abd/add, gs/qs, ham curls.    SciFit stepper level 1.5 x 10 min    Activity Tolerance:  Patient limited by pain and tolerated treatment fairly.    Patient left up in chair with call button in reach, chair alarm on and nurse notified.    Assessment:  Saman Crockett is a 88 y.o. male with a medical diagnosis of <principal problem not specified>. He presents with impaired mobility and gait due to R LE weakness post  R hip arthroplasty.  Pt tolerated activity on room air well with no complaints of SOB.  Pt's short term memory is poor, needing reminders 3 times during session that today is Monday and needed reminder several times that he had surgical repair of R femur fx.     Rehab potential is fair to good.    Activity tolerance: Fair    Discharge recommendations: Discharge Facility/Level Of Care Needs: home, home health PT     Equipment recommendations: Equipment Needed After Discharge: walker, rolling     GOALS:    Physical Therapy Goals        Problem: Physical Therapy Goal    Goal Priority Disciplines Outcome Goal Variances Interventions   Physical Therapy Goal     PT/OT, PT Ongoing (interventions implemented as appropriate)     Description:  Goals to be met by: 2017     Patient will increase functional independence with mobility by performin. Supine to sit with Stand-by Assistance  2. Sit to supine with Stand-by Assistance  3. Sit to stand transfer with Stand-by Assistance  4. Bed to chair transfer with Stand-by Assistance using Rolling Walker  5. Gait  x 200 feet with Stand-by Assistance using Rolling Walker.   6. Wheelchair propulsion x 200 feet with Modified [place] using bilateral uppper extremities  7. Ascend/descend at least 4 stair with left Handrail Contact Guard Assistance.                      PLAN:    Patient to be seen daily  to address the above listed problems via gait training, therapeutic activities, therapeutic exercises, therapeutic groups, wheelchair management/training  Plan of Care expires: 17  Plan of Care reviewed with: patient         Re Strong, PT 2017

## 2017-08-15 LAB
POCT GLUCOSE: 124 MG/DL (ref 70–110)
POCT GLUCOSE: 167 MG/DL (ref 70–110)
POCT GLUCOSE: 218 MG/DL (ref 70–110)
POCT GLUCOSE: 252 MG/DL (ref 70–110)

## 2017-08-15 PROCEDURE — 94761 N-INVAS EAR/PLS OXIMETRY MLT: CPT

## 2017-08-15 PROCEDURE — 92507 TX SP LANG VOICE COMM INDIV: CPT

## 2017-08-15 PROCEDURE — 12800000 HC REHAB SEMI-PRIVATE ROOM

## 2017-08-15 PROCEDURE — 97110 THERAPEUTIC EXERCISES: CPT

## 2017-08-15 PROCEDURE — 97530 THERAPEUTIC ACTIVITIES: CPT

## 2017-08-15 PROCEDURE — 97532 *HC SP COG SKL DEV EA 15 MIN: CPT

## 2017-08-15 PROCEDURE — 63600175 PHARM REV CODE 636 W HCPCS: Performed by: PHYSICAL MEDICINE & REHABILITATION

## 2017-08-15 PROCEDURE — 97535 SELF CARE MNGMENT TRAINING: CPT

## 2017-08-15 PROCEDURE — 25000003 PHARM REV CODE 250: Performed by: PHYSICAL MEDICINE & REHABILITATION

## 2017-08-15 PROCEDURE — 97116 GAIT TRAINING THERAPY: CPT

## 2017-08-15 RX ADMIN — ESCITALOPRAM 10 MG: 10 TABLET, FILM COATED ORAL at 09:08

## 2017-08-15 RX ADMIN — ENALAPRIL MALEATE 2.5 MG: 2.5 TABLET ORAL at 08:08

## 2017-08-15 RX ADMIN — ASPIRIN 325 MG ORAL TABLET 325 MG: 325 PILL ORAL at 09:08

## 2017-08-15 RX ADMIN — ACETAMINOPHEN 650 MG: 325 TABLET, FILM COATED ORAL at 10:08

## 2017-08-15 RX ADMIN — LEVOTHYROXINE SODIUM 150 MCG: 150 TABLET ORAL at 05:08

## 2017-08-15 RX ADMIN — ASPIRIN 325 MG ORAL TABLET 325 MG: 325 PILL ORAL at 08:08

## 2017-08-15 RX ADMIN — GLIPIZIDE 5 MG: 5 TABLET, FILM COATED, EXTENDED RELEASE ORAL at 08:08

## 2017-08-15 RX ADMIN — METFORMIN HYDROCHLORIDE 750 MG: 750 TABLET, EXTENDED RELEASE ORAL at 04:08

## 2017-08-15 RX ADMIN — MEMANTINE HYDROCHLORIDE 28 MG: 28 CAPSULE, EXTENDED RELEASE ORAL at 09:08

## 2017-08-15 RX ADMIN — ENOXAPARIN SODIUM 40 MG: 100 INJECTION SUBCUTANEOUS at 04:08

## 2017-08-15 RX ADMIN — THERA TABS 1 TABLET: TAB at 09:08

## 2017-08-15 RX ADMIN — GEMFIBROZIL 600 MG: 600 TABLET ORAL at 05:08

## 2017-08-15 RX ADMIN — FERROUS GLUCONATE 324 MG: 324 TABLET ORAL at 08:08

## 2017-08-15 RX ADMIN — TAMSULOSIN HYDROCHLORIDE 0.4 MG: 0.4 CAPSULE ORAL at 09:08

## 2017-08-15 RX ADMIN — METFORMIN HYDROCHLORIDE 750 MG: 750 TABLET, EXTENDED RELEASE ORAL at 08:08

## 2017-08-15 RX ADMIN — GEMFIBROZIL 600 MG: 600 TABLET ORAL at 04:08

## 2017-08-15 NOTE — PT/OT/SLP PROGRESS
"Speech Language Pathology Treatment          Saman Crockett   MRN: [id number]     Diet recommendations: Solid Diet Level: Regular  Liquid Diet Level: Thin     SLP Treatment Date: 08/15/17  Speech Start Time: 1105     Speech Stop Time: 1205     Speech Total (min): 60 min       TREATMENT BILLABLE MINUTES:  Speech Therapy Individual 60    General Precautions: Standard, fall, hearing impaired          Subjective:  "my memory is shot. I can't remember crap"    Objective:    Patient found with: peripheral IV  1. Pt will demonstrate use of memory strategies with 80% acc given verbal and/or visual cues with 80% acc following review.  2. Pt will use external memory aids and compensatory strategies to recall routine, personal information, and recent events to improve orientation to time & recall daily events with 80% acc and min verbal and/or visual cues.   3. Pt will recall daily activities via retelling/answering questions with 80% acc given min verbal and visual assistance  Pain/Comfort  Pain Rating 1: 0/10     Assessment:  Saman Crockett is a 88 y.o. male with a medical diagnosis of s/p total hip arthroplasty and presents with severe short term memory deficits. Previously provided and updated monthly calendar retrieved from bedside to enhance orientation to date, place, and therapy schedule. Additional information including therapist names were added. Use of calendar to enhance orientation was thoroughly reviewed and left w/in Pt's reach for spaced retrieval task. Pt required visual cue to utilize aid in 3/3 trials across 1,2, and 4 minute delays. Pt was able to provide daily schedule and personal information w/ aid. Pt instructed on strategies to enhance orientation such as using clock to determine how many meals have served, therapy schedule, etc. Pt completed functional reasoning tasks within education of environmental aids with 70% acc.     Diet recommendations:        Liquid Diet Level: Thin  Solid: Regular "     Discharge recommendations: Discharge Facility/Level Of Care Needs: home     Goals:    SLP Goals        Problem: SLP Goal    Goal Priority Disciplines Outcome   SLP Goal     SLP Ongoing (interventions implemented as appropriate)   Description:  Long term goals:  1. Pt will use appropriate memory strategies to schedule and recall weekly activities, express needs and recall names to maintain safety and participate socially in functional living environment.     Short term goals:  1. Pt will demonstrate use of memory strategies with 80% acc given verbal and/or visual cues with 80% acc following review.  2. Pt will recall a 3 part functional message related to ADL's with min verbal and visual assistance immediately after review with 80% acc.  3. Pt will recall daily activities via retelling/answering questions with 80% acc given min verbal and visual assistance   4. Pt will use external memory aids and compensatory strategies to recall routine, personal information, and recent events to improve orientation to time & recall daily events with 80% acc and min verbal and/or visual cues.   5. Pt will participate in ongoing assessment of reasoning, problem solving and safety awareness skills                      Plan:   Patient to be seen Therapy Frequency: 5 x/week   Plan of Care Expires: 08/23/17  Plan of Care reviewed with: patient  SLP Follow-up?: Yes  SLP - Next Visit Date: 08/16/17           ST Berna 8/15/2017

## 2017-08-15 NOTE — PROGRESS NOTES
[facility]  Physical Medicine & Rehab  Progress Note    Patient Name: Saman Crockett  MRN: [id number]  Patient Class: IP- Rehab   Admission Date: 8/9/2017  Length of Stay: 6 days  Attending Physician: Laura Guteirrez MD  Primary Care Provider: Rajan Munson MD    Subjective:     Principal Problem: s/p RTHA    Interval History: pt is 88 y.o male s/p R TIFFANIE, particpating with therapy    Scheduled Medications:  aspirin  325 mg Oral BID    enalapril  2.5 mg Oral Daily    enoxaparin  40 mg Subcutaneous Daily    escitalopram oxalate  10 mg Oral Daily    ferrous gluconate  324 mg Oral Daily with breakfast    gemfibrozil  600 mg Oral BID AC    glipiZIDE  5 mg Oral Daily with breakfast    insulin detemir  18 Units Subcutaneous QHS    levothyroxine  150 mcg Oral Before breakfast    memantine  28 mg Oral Daily    metformin  750 mg Oral BID WM    multivitamin  1 tablet Oral Daily    tamsulosin  0.4 mg Oral Daily       PRN Medications: sodium chloride, acetaminophen, aluminum-magnesium hydroxide-simethicone, bisacodyl, hydrocodone-acetaminophen 5-325mg, magnesium citrate    Review of Systems   Constitutional: Negative.    HENT: Negative.    Eyes: Negative.    Respiratory: Negative.    Cardiovascular: Negative.    Gastrointestinal: Negative.    Endocrine: Negative.    Genitourinary: Negative.    Musculoskeletal: Negative.    Skin: Negative.    Allergic/Immunologic: Negative.    Neurological: Negative.    Hematological: Negative.    Psychiatric/Behavioral: Negative.      Objective:     Vital Signs (Most Recent):  Temp: 98.6 °F (37 °C) (08/15/17 0700)  Pulse: (!) 58 (08/15/17 0700)  Resp: 18 (08/15/17 0700)  BP: (!) 159/59 (08/15/17 0700)  SpO2: (!) 93 % (08/15/17 0700)    Vital Signs (24h Range):  Temp:  [98.6 °F (37 °C)-98.7 °F (37.1 °C)] 98.6 °F (37 °C)  Pulse:  [58-66] 58  Resp:  [17-20] 18  SpO2:  [93 %-96 %] 93 %  BP: (138-159)/(58-60) 159/59     Physical Exam   Constitutional: He is  oriented to person, place, and time. He appears well-developed and well-nourished. No distress.   HENT:   Head: Normocephalic and atraumatic.   Right Ear: External ear normal.   Left Ear: External ear normal.   Nose: Nose normal.   Mouth/Throat: Oropharynx is clear and moist. No oropharyngeal exudate.   Eyes: Conjunctivae and EOM are normal. Pupils are equal, round, and reactive to light. Right eye exhibits no discharge. Left eye exhibits no discharge. No scleral icterus.   Neck: Normal range of motion. Neck supple. No JVD present. No tracheal deviation present. No thyromegaly present.   Cardiovascular: Normal rate, regular rhythm, normal heart sounds and intact distal pulses.  Exam reveals no gallop and no friction rub.    No murmur heard.  Pulmonary/Chest: Effort normal and breath sounds normal. No respiratory distress. He has no wheezes. He has no rales. He exhibits no tenderness.   Abdominal: Soft. Bowel sounds are normal. He exhibits no distension and no mass. There is no tenderness. There is no rebound and no guarding. No hernia.   Genitourinary:   Genitourinary Comments: deferred   Musculoskeletal: Normal range of motion. He exhibits no edema, tenderness or deformity.   Lymphadenopathy:     He has no cervical adenopathy.   Neurological: He is alert and oriented to person, place, and time. He exhibits normal muscle tone.   Skin: No rash noted. He is not diaphoretic. No erythema. No pallor.   Incision healing well   Psychiatric: He has a normal mood and affect.     NEUROLOGICAL EXAMINATION:     MENTAL STATUS   Oriented to person, place, and time.     CRANIAL NERVES     CN III, IV, VI   Pupils are equal, round, and reactive to light.  Extraocular motions are normal.       Assessment/Plan:      Saman Crockett is a 88 y.o. male admitted to inpatient rehabilitation on 8/9/2017 for <principal problem not specified> with impaired mobility and ADLs. Patient remains appropriate for PT, OT, and as required Speech  therapy. Patient continues to require 24 hour nursing care as well as daily Physician assessment.    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] 08/09/2017 Not Applicable      Problems Resolved During this Admission:    Diagnosis Date Noted Date Resolved POA     S/p R TIFFANIE, cont PT, OT  Pain, managed with current meds  DVT prophylaxis, cont sq Lovenox  HTN, vitals noted, cont current meds  DM, accu check noted, cont current meds  White removed & pt is voiding   DISCHARGE PLANNING:  Tentative Discharge Date:     Future Appointments  Date Time Provider Department Center   8/30/2017 1:00 PM Rajan Munson MD [facility] [address]       Laura Gutierrez MD  Department of Physical Medicine & Rehab  [facility]

## 2017-08-15 NOTE — PLAN OF CARE
Problem: Occupational Therapy Goal  Goal: Occupational Therapy Goal  Goals to be met by: 8/24/17     Patient will increase functional independence with ADLs by performing:    Feeding with [place].  UE Dressing with [place].  LE Dressing with Modified [place].  Grooming while seated with [place].  Toileting from toilet with Modified [place] for hygiene and clothing management.   Bathing from shower chair/bench with Supervision.  Shower transfer with Supervision.  Toilet transfer to toilet with Modified [place].     Outcome: Ongoing (interventions implemented as appropriate)  OT goals remain appropriate

## 2017-08-15 NOTE — PLAN OF CARE
Problem: SLP Goal  Goal: SLP Goal  Long term goals:  1. Pt will use appropriate memory strategies to schedule and recall weekly activities, express needs and recall names to maintain safety and participate socially in functional living environment.     Short term goals:  1. Pt will demonstrate use of memory strategies with 80% acc given verbal and/or visual cues with 80% acc following review.  2. Pt will recall a 3 part functional message related to ADL's with min verbal and visual assistance immediately after review with 80% acc.  3. Pt will recall daily activities via retelling/answering questions with 80% acc given min verbal and visual assistance   4. Pt will use external memory aids and compensatory strategies to recall routine, personal information, and recent events to improve orientation to time & recall daily events with 80% acc and min verbal and/or visual cues.   5. Pt will participate in ongoing assessment of reasoning, problem solving and safety awareness skills    Outcome: Ongoing (interventions implemented as appropriate)  Saman Crockett is a 88 y.o. male with a medical diagnosis of s/p total hip arthroplasty and presents with severe short term memory deficits. Previously provided and updated monthly calendar retrieved from bedside to enhance orientation to date, place, and therapy schedule. Additional information including therapist names were added. Use of calendar to enhance orientation was thoroughly reviewed and left w/in Pt's reach for spaced retrieval task. Pt required visual cue to utilize aid in 3/3 trials across 1,2, and 4 minute delays. Pt was able to provide daily schedule and personal information w/ aid. Pt instructed on strategies to enhance orientation such as using clock to determine how many meals have served, therapy schedule, etc. Pt completed functional reasoning tasks within education of environmental aids with 70% acc.

## 2017-08-15 NOTE — PT/OT/SLP PROGRESS
"Physical Therapy         Treatment        Saman Crockett   MRN: [id number]     PT Received On: 08/15/17  Total Time (min): 75        Billable Minutes:  Gait [id number], Therapeutic Activity 10 and Therapeutic Exercise 45  Total Minutes: 75    Treatment Type: Treatment  PT/PTA: PT     PTA Visit Number: 0       General Precautions: Standard, fall  Orthopedic Precautions: Orthopedic Precautions : RLE weight bearing as tolerated, RLE anterior precautions      Subjective:  Pain/Comfort  Pain Rating 1: 4/10  Location - Side 1: Right  Location 1: hip  Pain Addressed 1: Reposition, Distraction, Nurse notified, Cessation of Activity  Pain Rating Post-Intervention 1: 4/10    Objective:  Patient found seated in w/c, in NAD.       Functional Mobility:    Transfer Training:  Sit to stand:Contact Guard Assistance with Rolling Walker  x 8  Toilet Transfer:  Pt Stand Pivot with Contact Guard Assistance with Grab bars      Wheelchair Training: `150'x  1, 50' x 2 with SBA    Gait Training: 15' x 1, 60' x 2, 175' x 1 with RW with CGA and cues    Stair Training: ascend/descend 6 4" steps with rails with CGA and cues      Additional Treatment:  SEATED: LAQ and hip flexion (L with 3#), hip abduction with red tband, hip adduction with ball, ankle DF/PF, knee flexion with red tband, x 30 reps each    SciFit StepOne: L 1.0 x 6 min, LEs only       Activity Tolerance:  Patient limited by fatigue, Patient limited by pain     Patient left up in chair with chair alarm on and OT notified.    Assessment:  Saman Crockett is a 88 y.o. male     Rehab potential is good.    Activity tolerance: Fair      GOALS:    Physical Therapy Goals        Problem: Physical Therapy Goal    Goal Priority Disciplines Outcome Goal Variances Interventions   Physical Therapy Goal     PT/OT, PT Ongoing (interventions implemented as appropriate)     Description:  Goals to be met by: 8/25/2017     Patient will increase functional independence with mobility by " performin. Supine to sit with Stand-by Assistance  2. Sit to supine with Stand-by Assistance  3. Sit to stand transfer with Stand-by Assistance  4. Bed to chair transfer with Stand-by Assistance using Rolling Walker  5. Gait  x 200 feet with Stand-by Assistance using Rolling Walker.   6. Wheelchair propulsion x 200 feet with Modified [place] using bilateral uppper extremities  7. Ascend/descend at least 4 stair with left Handrail Contact Guard Assistance.                      PLAN:    Patient to be seen daily  to address the above listed problems via gait training, therapeutic activities, therapeutic exercises, neuromuscular re-education, wheelchair management/training  Plan of Care expires: 17  Plan of Care reviewed with: patient         Jeffrey ZUNIGA Katherine, PT 8/15/2017

## 2017-08-15 NOTE — PROGRESS NOTES
Team conference attended and patient discussed.  Spoke with patient to discuss treatment plans, goals and ELOS.  Spoke with patient's daughter to update and discuss discharge plans and schedule family training for next Monday. SW will follow and assist with discharge planning as needed.

## 2017-08-15 NOTE — PLAN OF CARE
Problem: Patient Care Overview  Goal: Plan of Care Review  Outcome: Ongoing (interventions implemented as appropriate)  Patient awake/alert. Forgetfulness noted. No c/o pain this shift. Continent of B/B. Free from falls. Plan of care continued

## 2017-08-15 NOTE — PT/OT/SLP PROGRESS
"Occupational Therapy  Treatment    Saman Crockett   MRN: [id number]   Admitting Diagnosis: femur fx with displacement s/p R hip arthroplasty     OT Date of Treatment: 08/15/17   Total Time (min): 70 min    Billable Minutes:  Self Care/Home Management 10, Therapeutic Activity 25 and Therapeutic Exercise 35  Total Minutes: 70    General Precautions: Standard, fall, hearing impaired  Orthopedic Precautions: RLE weight bearing as tolerated, RLE anterior precautions  Braces: N/A    Do you have any cultural, spiritual, Yazidism conflicts, given your current situation?: None    Subjective:  Communicated with nurse prior to session.    Pain/Comfort  Pain Rating 1: 0/10 (Unrated discomfort of R LE)  Pain Addressed 1: Distraction, Nurse notified  Pain Rating Post-Intervention 1: 0/10    Objective:  Patient found with: peripheral IV ((R) arm)    Functional Mobility:  Feeding:  Patient Set-up with sandwich items (bread, peanut butter, jelly, knife) and pt fixing his own sandwich with Supervision and feeding self with Supervision    LE Dressing:  Pt don/doffed socks with dressing stick and sock aid - pt requires verbal cues to utilize adaptive equipment     Additional Treatment:  - UBE 2 sets x 5 mins for 2 "directions"  - B manny with 8# for 2 sets x 2 mins with rest break between sets  - Wheelchair propulsion > 50 ft with Supervision with verbal cues     Patient left up in chair with all lines intact, chair alarm on, nurse notified and seated within 10 ft of nurses station watching tv    ASSESSMENT:  Saman Crockett is a 88 y.o. male with a medical diagnosis of femur fx with displacement s/p R hip arthroplasty and presents with daily progress towards OT goals. Patient should continue to benefit from skilled OT services per est POC to increase independence with self care, mobility, and safety.     Rehab potential is good    Activity tolerance: Good    Discharge recommendations: home     Equipment recommendations:  (tbd) "     GOALS:    Occupational Therapy Goals        Problem: Occupational Therapy Goal    Goal Priority Disciplines Outcome Interventions   Occupational Therapy Goal     OT, PT/OT Ongoing (interventions implemented as appropriate)    Description:  Goals to be met by: 8/24/17     Patient will increase functional independence with ADLs by performing:    Feeding with [place].  UE Dressing with [place].  LE Dressing with Modified [place].  Grooming while seated with [place].  Toileting from toilet with Modified [place] for hygiene and clothing management.   Bathing from shower chair/bench with Supervision.  Shower transfer with Supervision.  Toilet transfer to toilet with Modified [place].                      Plan:  Patient to be seen 6 x/week to address the above listed problems via self-care/home management, community/work re-entry, therapeutic activities, therapeutic exercises, therapeutic groups, wheelchair management/training  Plan of Care expires: 08/24/17  Plan of Care reviewed with: patient    Chica CHEL May LOTR  08/15/2017

## 2017-08-15 NOTE — PLAN OF CARE
Problem: Infection, Risk/Actual (Adult)  Goal: Identify Related Risk Factors and Signs and Symptoms  Related risk factors and signs and symptoms are identified upon initiation of Human Response Clinical Practice Guideline (CPG)   Outcome: Ongoing (interventions implemented as appropriate)  AA vss tolerating therapy well incision well approximated and healing

## 2017-08-16 LAB
POCT GLUCOSE: 119 MG/DL (ref 70–110)
POCT GLUCOSE: 138 MG/DL (ref 70–110)
POCT GLUCOSE: 186 MG/DL (ref 70–110)

## 2017-08-16 PROCEDURE — 12800000 HC REHAB SEMI-PRIVATE ROOM

## 2017-08-16 PROCEDURE — 97532 *HC SP COG SKL DEV EA 15 MIN: CPT

## 2017-08-16 PROCEDURE — 97110 THERAPEUTIC EXERCISES: CPT

## 2017-08-16 PROCEDURE — 92610 EVALUATE SWALLOWING FUNCTION: CPT

## 2017-08-16 PROCEDURE — 94761 N-INVAS EAR/PLS OXIMETRY MLT: CPT

## 2017-08-16 PROCEDURE — 92507 TX SP LANG VOICE COMM INDIV: CPT

## 2017-08-16 PROCEDURE — 97116 GAIT TRAINING THERAPY: CPT

## 2017-08-16 PROCEDURE — 97535 SELF CARE MNGMENT TRAINING: CPT

## 2017-08-16 PROCEDURE — 97530 THERAPEUTIC ACTIVITIES: CPT

## 2017-08-16 PROCEDURE — 25000003 PHARM REV CODE 250: Performed by: PHYSICAL MEDICINE & REHABILITATION

## 2017-08-16 PROCEDURE — 63600175 PHARM REV CODE 636 W HCPCS: Performed by: PHYSICAL MEDICINE & REHABILITATION

## 2017-08-16 RX ADMIN — METFORMIN HYDROCHLORIDE 750 MG: 750 TABLET, EXTENDED RELEASE ORAL at 04:08

## 2017-08-16 RX ADMIN — GLIPIZIDE 5 MG: 5 TABLET, FILM COATED, EXTENDED RELEASE ORAL at 08:08

## 2017-08-16 RX ADMIN — GEMFIBROZIL 600 MG: 600 TABLET ORAL at 04:08

## 2017-08-16 RX ADMIN — ENOXAPARIN SODIUM 40 MG: 100 INJECTION SUBCUTANEOUS at 04:08

## 2017-08-16 RX ADMIN — FERROUS GLUCONATE 324 MG: 324 TABLET ORAL at 08:08

## 2017-08-16 RX ADMIN — ASPIRIN 325 MG ORAL TABLET 325 MG: 325 PILL ORAL at 08:08

## 2017-08-16 RX ADMIN — METFORMIN HYDROCHLORIDE 750 MG: 750 TABLET, EXTENDED RELEASE ORAL at 08:08

## 2017-08-16 RX ADMIN — TAMSULOSIN HYDROCHLORIDE 0.4 MG: 0.4 CAPSULE ORAL at 08:08

## 2017-08-16 RX ADMIN — THERA TABS 1 TABLET: TAB at 08:08

## 2017-08-16 RX ADMIN — ESCITALOPRAM 10 MG: 10 TABLET, FILM COATED ORAL at 08:08

## 2017-08-16 RX ADMIN — GEMFIBROZIL 600 MG: 600 TABLET ORAL at 05:08

## 2017-08-16 RX ADMIN — ACETAMINOPHEN 650 MG: 325 TABLET, FILM COATED ORAL at 02:08

## 2017-08-16 RX ADMIN — MEMANTINE HYDROCHLORIDE 28 MG: 28 CAPSULE, EXTENDED RELEASE ORAL at 08:08

## 2017-08-16 RX ADMIN — ACETAMINOPHEN 650 MG: 325 TABLET, FILM COATED ORAL at 03:08

## 2017-08-16 RX ADMIN — LEVOTHYROXINE SODIUM 150 MCG: 150 TABLET ORAL at 05:08

## 2017-08-16 RX ADMIN — ENALAPRIL MALEATE 2.5 MG: 2.5 TABLET ORAL at 08:08

## 2017-08-16 NOTE — PT/OT/SLP PROGRESS
"Speech Language Pathology Treatment          Saman Crockett   MRN: [id number]     Diet recommendations: Solid Diet Level: Regular  Liquid Diet Level: Nectar Thick   No straws, Small bites/sips, Alternating bites/sips, Meds whole buried in puree, Eliminate distractions and Assistance with thickening liquids    SLP Treatment Date: 08/16/17  Speech Start Time: 0955     Speech Stop Time: 1050     Speech Total (min): 55 min       TREATMENT BILLABLE MINUTES:  Speech Therapy Individual 55    General Precautions: Standard, fall, nectar thick, hearing impaired          Subjective:  "I don't need to be here. I'm about to roll out of here" Pt in re: rehab     Objective:   Patient found with: peripheral IV  1. Pt will demonstrate use of memory strategies with 80% acc given verbal and/or visual cues with 80% acc following review.  2. Pt will use external memory aids and compensatory strategies to recall routine, personal information, and recent events to improve orientation to time & recall daily events with 80% acc and min verbal and/or visual cues.   3. Pt will recall daily activities via retelling/answering questions with 80% acc given min verbal and visual assistance  Pain/Comfort  Pain Rating 1: 0/10     Assessment:  Saman Crockett is a 88 y.o. male with a medical diagnosis of s/p total hip arthroplasty and presents with severe short term memory deficits. Monthly calendar visible from bedside to enhance orientation to date, place, therapy schedule and staff names. Use of calendar to enhance orientation was thoroughly reviewed with Pt instructed to refer throughout spaced retrieval task. Pt required visual cue to utilize aid in 4/4 trials across 1,3, 6 and 12 minute delays. However, Pt was able to recall 60% of information independently during 6 and 12 minute delay trials. Throughout unstructured task, Pt was provided with visual instruction for referral. Frequent verbal redirection was required throughout task for Pt to " utilize cue.      Diet recommendations:        Liquid Diet Level: Nectar Thick  Solid: Regular     Discharge recommendations: Discharge Facility/Level Of Care Needs: home     Goals:    SLP Goals        Problem: SLP Goal    Goal Priority Disciplines Outcome   SLP Goal     SLP Ongoing (interventions implemented as appropriate)   Description:  Long term goals:  1. Pt will use appropriate memory strategies to schedule and recall weekly activities, express needs and recall names to maintain safety and participate socially in functional living environment.     Short term goals:  1. Pt will demonstrate use of memory strategies with 80% acc given verbal and/or visual cues with 80% acc following review.  2. Pt will recall a 3 part functional message related to ADL's with min verbal and visual assistance immediately after review with 80% acc.  3. Pt will recall daily activities via retelling/answering questions with 80% acc given min verbal and visual assistance   4. Pt will use external memory aids and compensatory strategies to recall routine, personal information, and recent events to improve orientation to time & recall daily events with 80% acc and min verbal and/or visual cues.   5. Pt will participate in ongoing assessment of reasoning, problem solving and safety awareness skills                      Plan:   Patient to be seen Therapy Frequency: 5 x/week   Plan of Care Expires: 08/23/17  Plan of Care reviewed with: patient, spouse, caregiver  SLP Follow-up?: Yes  SLP - Next Visit Date: 08/17/17           ST Berna 8/16/2017

## 2017-08-16 NOTE — PT/OT/SLP EVAL
"Speech Language Pathology  Evaluation    Saman Crocktet   MRN: [id number]   Admitting Diagnosis: <principal problem not specified>    Diet recommendations: Solid Diet Level: Regular  Liquid Diet Level: Nectar Thick No straws, Small bites/sips, Alternating bites/sips, Meds whole buried in puree, Eliminate distractions and Assistance with thickening liquids    SLP Treatment Date: 08/16/17  Speech Start Time: 1202     Speech Stop Time: 1222     Speech Total (min): 20 min       TREATMENT BILLABLE MINUTES:  Treatment Swallowing Dysfunction 10 and Eval Swallow and Oral Function 10    Diagnosis: <principal problem not specified>    Past Medical History:   Diagnosis Date    Diabetes mellitus     Elevated PSA     Hyperlipidemia     Kidney stone     Thyroid disease      Past Surgical History:   Procedure Laterality Date    LITHOTRIPSY      tonisilectomy         Has the patient been evaluated by SLP for swallowing? : Yes  Keep patient NPO?: No   General Precautions: Standard, fall, nectar thick, hearing impaired          Subjective:  "I don't have no swallowing problems. Sometimes it just goes the wrong way"   Spouse present at bedside reporting no history of PNA or swallowing difficulty with exception of inconsistent coughing.      Objective:   Patient found with: peripheral IV    Oral Musculature Evaluation  Oral Musculature: WFL  Dentition: present and adequate  Secretion Management:  ([place] vocal quality noted as utterance length increases)  Mucosal Quality: good  Oral Labial Strength and Mobility: functional seal, functional pursing, functional coordination  Lingual Strength and Mobility: functional lateral movement, functional protrusion, functional anterior elevation  Buccal Strength and Mobility: decreased tone  Volitional Cough: Stong; adequate  Volitional Swallow: Reduced hyolaryngeal movement  Voice Prior to PO Intake: Average intensity; WFL     Bedside Swallow Eval:  Consistencies Assessed: Thin liquids " single sipx8, Nectar thick liquids 3oz and Solids 4single bites  Oral Phase: Pt with adequate oral containment and coordination across consistencies trialed.   Pharyngeal Phase: To digital palpation, Pt with delayed initiation of swallow and reduced hyolaryngeal movement with thin liquids; Timeliness of swallow increased with nectar thick liquids. Pt with wet vocal quality following single straw x4 and open cup sips x4. No change in vocal quality observed following single sips of nectar thick liquid to consume 3 oz volume. Pt     Additional Treatment:    Pt, spouse and caregiver provided w/ extensive education re: swallowing mechanism, penetration/aspiration, benefit of thickened liquids and thickening procedures.     Assessment:  Saman Crockett is a 88 y.o. male with a medical diagnosis of <principal problem not specified> and presents with Cognitive impairment and dysphagia.         Discharge recommendations: Discharge Facility/Level Of Care Needs: home     Diet recommendations:    Liquid Diet Level: Nectar Thick     Goals:    SLP Goals        Problem: SLP Goal    Goal Priority Disciplines Outcome   SLP Goal     SLP Ongoing (interventions implemented as appropriate)   Description:  Long term goals:  1. Pt will use appropriate memory strategies to schedule and recall weekly activities, express needs and recall names to maintain safety and participate socially in functional living environment.     Short term goals:  1. Pt will demonstrate use of memory strategies with 80% acc given verbal and/or visual cues with 80% acc following review.  2. Pt will recall a 3 part functional message related to ADL's with min verbal and visual assistance immediately after review with 80% acc.  3. Pt will recall daily activities via retelling/answering questions with 80% acc given min verbal and visual assistance   4. Pt will use external memory aids and compensatory strategies to recall routine, personal information, and recent  events to improve orientation to time & recall daily events with 80% acc and min verbal and/or visual cues.   5. Pt will participate in ongoing assessment of reasoning, problem solving and safety awareness skills                      Plan:   Patient to be seen Therapy Frequency: 5 x/week   Plan of Care expires: 08/23/17  Plan of Care reviewed with: patient, spouse, caregiver  SLP Follow-up?: Yes  SLP - Next Visit Date: 08/17/17           ST Berna  08/16/2017

## 2017-08-16 NOTE — PT/OT/SLP PROGRESS
"Physical Therapy         Treatment        Saman Crockett   MRN: [id number]     PT Received On: 08/16/17  Total Time (min): 75        Billable Minutes:  Gait [id number], Therapeutic Activity 30, Therapeutic Exercise 30 and Train/Wheelchair Management 5  Total Minutes: 75    Treatment Type: Treatment  PT/PTA: PT     PTA Visit Number: 0       General Precautions: Standard, fall, hearing impaired  Orthopedic Precautions: Orthopedic Precautions : RLE weight bearing as tolerated, RLE anterior precautions   Braces: Braces: N/A         Subjective:  Communicated with nurse Perea prior to and during session.  At least 5 times during there ex, pt stated "boy this leg hurts. I wonder what happened to this leg."     Pain/Comfort  Pain Rating 1: 3/10  Location - Side 1: Right  Location 1:  (thigh & knee)  Pain Addressed 1: Reposition, Distraction, Nurse notified (Nurse brought Tylenol for pt.)  Pain Rating Post-Intervention 1: 3/10    Objective:  Patient found attempting to get oob without calling for assistance to get to the bathroom.   Patient found with: peripheral IV    Functional Mobility:  Bed Mobility:   Supine to sit: Modified Independent   Sit to supine: Activity did not occur   Rolling: Activity did not occur   Scooting: Modified Independent    Transfer Training:  Sit to stand:Stand-by Assistance and Contact Guard Assistance with Rolling Walker    Toilet Transfer:  Pt step turn with Contact Guard Assistance with Rolling Walker      Wheelchair Training:  Pt propelled Standard wheelchair x 200 feet on Level tile with  Bilateral upper extremity with Supervision or Set-up Assistance.     Gait Training:  On level tile with RW and CGA x 150 feet with vc for upright posture.    Additional Treatment:  Pt performed B LE there ex sitting x 30 reps with 2# wt L LE and red TB R/green TB L, with vc for proper execution and joint protection: ap, laq, marching, hip abd/add with ball, gs/qs, ham curls.    SciFit stepper x 15 min on " level 1.5    Toileting with transfers as noted above, Sup A for clothing management and hygiene.  Pt stood at sink with RW for hand hygiene.    Activity Tolerance:  Patient limited by pain and tolerated treatment fairly,  complaining about having to work the entire time, but participating.    Patient left up in w/c in dining area with a snack with chair alarm on and nurse notified.    Assessment:  Saman Crockett is a 88 y.o. male with a medical diagnosis of <principal problem not specified>. He presents with impaired mobility and gait disturbance due to pain and R LE weakness post R hip arthroplasty.  Pt's memory is poor and safety awareness is poor, as well, which could limit pt's ability to safely return to Independent mobility.    Rehab potential is fair to good.    Activity tolerance: Fair    Discharge recommendations: Discharge Facility/Level Of Care Needs: home, home health PT     Equipment recommendations: Equipment Needed After Discharge: walker, rolling     GOALS:    Physical Therapy Goals        Problem: Physical Therapy Goal    Goal Priority Disciplines Outcome Goal Variances Interventions   Physical Therapy Goal     PT/OT, PT Ongoing (interventions implemented as appropriate)     Description:  Goals to be met by: 2017     Patient will increase functional independence with mobility by performin. Supine to sit with Stand-by Assistance  2. Sit to supine with Stand-by Assistance  3. Sit to stand transfer with Stand-by Assistance  4. Bed to chair transfer with Stand-by Assistance using Rolling Walker  5. Gait  x 200 feet with Stand-by Assistance using Rolling Walker.   6. Wheelchair propulsion x 200 feet with Modified [place] using bilateral uppper extremities  7. Ascend/descend at least 4 stair with left Handrail Contact Guard Assistance.                      PLAN:    Patient to be seen daily  to address the above listed problems via gait training, therapeutic activities, therapeutic  exercises, therapeutic groups, wheelchair management/training  Plan of Care expires: 08/25/17  Plan of Care reviewed with: patient         Re Strong, PT 8/16/2017

## 2017-08-16 NOTE — PT/OT/SLP PROGRESS
Occupational Therapy  Treatment    Saman Crockett   MRN: [id number]   Admitting Diagnosis: femur fx with displacement s/p R hip arthroplasty     OT Date of Treatment: 08/16/17   Total Time (min): 75 min    Billable Minutes:  Self Care/Home Management 60 and Therapeutic Exercise 15  Total Minutes: 75    General Precautions: Standard, fall, hearing impaired  Orthopedic Precautions: RLE weight bearing as tolerated, RLE anterior precautions  Braces: N/A    Do you have any cultural, spiritual, Hoahaoism conflicts, given your current situation?: None    Subjective:  Communicated with nurseEliazar prior to session - present at start of session to distribute morning meds.    Pain/Comfort  Pain Rating 1:  (Unrated pain of R LE during transfers)  Pain Addressed 1: Reposition, Distraction, Nurse notified    Objective:  Patient found with: peripheral IV (R hip incision staples)    Functional Mobility:  Bed Mobility:   Supine to sit: Minimal Assistance   Sit to supine: Moderate Assistance    Transfer Training:   Sit to stand:Stand-by Assistance with Rolling Walker 4+ times throughout session to perform self care tasks; occasional VC for safety/proper hand placement  Bed <> Chair:  Stand Pivot with Stand-by Assistance with Rolling Walker    Patient tub bench transfer Stand Pivot with Stand-by Assistance with Rolling Walker.- requires verbal instruction to perform t/f using correct/safe technique with proper hand placement   >>>> Patient ambulating with SBA with RW from wheelchair next to bed > transfer tub bench > wheelchair for approximately 35 ft with no LOB and occasional VC for safety awareness     Grooming:  Set-up sitting sink side    Bathing:  Patient performed bathing with Stand-by Assistance with grab bar, Handheld shower head and tub bench at Shower. - pt instructed on adaptive technique to dry feet/lower legs and requires verbal cues throughout bathing for safety awareness; with SBA, pt wash/rinse/drying rehan area  buttocks in standing with occasional stabilization of grab bar - no LOB  >> (R) peripheral IV and (R) hip incision covered and kept dry throughout bathing; R hip incision with noted mild redness - nurse notified     UE Dressing:  Patient performed UE Dressing with Supervision or Set-up Assistance with no AE at tub bench.    LE Dressing:  Patient don/doffed adult brief with Stand-by Assistance with verbal instruction for cordell dressing techniques to don R LE first and adaptive techniques to hold pants with L UE only and use R UE to assist raising R LE to thread through clothing opening - pt performing with SBA   Patient don/doffed pants with Stand-by Assistance with verbal instruction for cordell dressing techniques to don R LE first and adaptive techniques to hold pants with L UE only and use R UE to assist raising R LE to thread through clothing opening - pt performing with SBA     Additional Treatment:  - Wheelchair propulsion > 50 ft with Supervision  - UBE x 5 mins for UB endurance   - Pt propelling wheelchair to coffee station and preparing cup of coffee with (A) to safely pour coffee only   - Pt with coughing episode while drinking coffee at end of session - OTR also heard pt coughing while in pt's room across the méndez - pt found to be eating sandwich - OTR communicating coughing to nurse and SLP    Patient left HOB elevated with all lines intact, call button in reach, bed alarm on, nurse notified and left with cell phone and glasses to call wife    ASSESSMENT:  Saman Crockett is a 88 y.o. male with a medical diagnosis of femur fx with displacement s/p R hip arthroplasty and presents with daily progress towards OT goals. Patient should continue to benefit from skilled OT services per est POC to increase functional independence, mobility, and safety.     Rehab potential is good    Activity tolerance: Good    Discharge recommendations: home     Equipment recommendations:  (tbd)     GOALS:    Occupational Therapy  Goals        Problem: Occupational Therapy Goal    Goal Priority Disciplines Outcome Interventions   Occupational Therapy Goal     OT, PT/OT Revised    Description:  Goals to be met by: 8/24/17     Patient will increase functional independence with ADLs by performing:    Feeding with [place].  UE Dressing with [place].  LE Dressing with Stand By Assistance with DME/AE as needed.  Grooming while seated with [place].  Toileting from toilet with Modified [place] for hygiene and clothing management.   Bathing from shower chair/bench with Supervision.  Shower transfer with Supervision.  Toilet transfer to toilet with Modified [place].                       Plan:  Patient to be seen 6 x/week to address the above listed problems via self-care/home management, community/work re-entry, therapeutic activities, therapeutic exercises, therapeutic groups, wheelchair management/training  Plan of Care expires: 08/24/17  Plan of Care reviewed with: patient    Chica CHEL May LOTR    08/16/2017

## 2017-08-16 NOTE — PLAN OF CARE
Problem: Patient Care Overview  Goal: Plan of Care Review  Outcome: Ongoing (interventions implemented as appropriate)  Patient awake/alert.  Medicated x 2 for pain. Remains continent. Awake most of night. Free from falls. Plan of care continued

## 2017-08-16 NOTE — PLAN OF CARE
Problem: Physical Therapy Goal  Goal: Physical Therapy Goal  Goals to be met by: 2017     Patient will increase functional independence with mobility by performin. Supine to sit with Stand-by Assistance  2. Sit to supine with Stand-by Assistance  3. Sit to stand transfer with Stand-by Assistance  4. Bed to chair transfer with Stand-by Assistance using Rolling Walker  5. Gait  x 200 feet with Stand-by Assistance using Rolling Walker.   6. Wheelchair propulsion x 200 feet with Modified [place] using bilateral uppper extremities  7. Ascend/descend at least 4 stair with left Handrail Contact Guard Assistance.     Outcome: Ongoing (interventions implemented as appropriate)    PT for activity, there ex, w/c and gait.

## 2017-08-16 NOTE — PLAN OF CARE
Problem: Occupational Therapy Goal  Goal: Occupational Therapy Goal  Goals to be met by: 8/24/17     Patient will increase functional independence with ADLs by performing:    Feeding with [place].  UE Dressing with [place].  LE Dressing with Stand By Assistance with DME/AE as needed.  Grooming while seated with [place].  Toileting from toilet with Modified [place] for hygiene and clothing management.   Bathing from shower chair/bench with Supervision.  Shower transfer with Supervision.  Toilet transfer to toilet with Modified [place].     Outcome: Revised  OT goals revised on this date by this OT

## 2017-08-16 NOTE — PROGRESS NOTES
[facility]  Physical Medicine & Rehab  Progress Note    Patient Name: Saman Crockett  MRN: [id number]  Patient Class: IP- Rehab   Admission Date: 8/9/2017  Length of Stay: 7 days  Attending Physician: Laura Gutierrez MD  Primary Care Provider: Rajan Munson MD    Subjective:     Principal Problem: s/p R TIFFANIE  Interval History: pt is 88 y.o male , participating with therapy & making gradual progress      Scheduled Medications:  aspirin  325 mg Oral BID    enalapril  2.5 mg Oral Daily    enoxaparin  40 mg Subcutaneous Daily    escitalopram oxalate  10 mg Oral Daily    ferrous gluconate  324 mg Oral Daily with breakfast    gemfibrozil  600 mg Oral BID AC    glipiZIDE  5 mg Oral Daily with breakfast    insulin detemir  18 Units Subcutaneous QHS    levothyroxine  150 mcg Oral Before breakfast    memantine  28 mg Oral Daily    metformin  750 mg Oral BID WM    multivitamin  1 tablet Oral Daily    tamsulosin  0.4 mg Oral Daily       PRN Medications: sodium chloride, acetaminophen, aluminum-magnesium hydroxide-simethicone, bisacodyl, hydrocodone-acetaminophen 5-325mg, magnesium citrate    Review of Systems   Constitutional: Negative.    HENT: Negative.    Eyes: Negative.    Respiratory: Negative.    Cardiovascular: Negative.    Gastrointestinal: Negative.    Endocrine: Negative.    Genitourinary: Negative.    Musculoskeletal: Negative.    Skin: Negative.    Allergic/Immunologic: Negative.    Neurological: Negative.    Hematological: Negative.    Psychiatric/Behavioral: Negative.      Objective:     Vital Signs (Most Recent):  Temp: 98.3 °F (36.8 °C) (08/16/17 0523)  Pulse: 62 (08/16/17 0523)  Resp: 18 (08/16/17 0523)  BP: 136/61 (08/16/17 0810)  SpO2: 97 % (08/16/17 0523)    Vital Signs (24h Range):  Temp:  [97.6 °F (36.4 °C)-98.3 °F (36.8 °C)] 98.3 °F (36.8 °C)  Pulse:  [60-65] 62  Resp:  [18-20] 18  SpO2:  [94 %-97 %] 97 %  BP: (136-161)/(54-74) 136/61     Physical Exam   Constitutional:  He is oriented to person, place, and time. He appears well-developed and well-nourished. No distress.   HENT:   Head: Normocephalic and atraumatic.   Right Ear: External ear normal.   Left Ear: External ear normal.   Nose: Nose normal.   Mouth/Throat: Oropharynx is clear and moist. No oropharyngeal exudate.   Eyes: Conjunctivae and EOM are normal. Pupils are equal, round, and reactive to light. Right eye exhibits no discharge. Left eye exhibits no discharge. No scleral icterus.   Neck: Normal range of motion. Neck supple. No JVD present. No tracheal deviation present. No thyromegaly present.   Cardiovascular: Normal rate, regular rhythm, normal heart sounds and intact distal pulses.  Exam reveals no gallop and no friction rub.    No murmur heard.  Pulmonary/Chest: Effort normal and breath sounds normal. No stridor. No respiratory distress. He has no wheezes. He has no rales. He exhibits no tenderness.   Abdominal: Soft. Bowel sounds are normal. He exhibits no distension and no mass. There is no tenderness. There is no rebound and no guarding. No hernia.   Genitourinary:   Genitourinary Comments: deferred   Musculoskeletal: Normal range of motion. He exhibits no edema, tenderness or deformity.   Neurological: He is alert and oriented to person, place, and time. He exhibits normal muscle tone.   Skin: No rash noted. He is not diaphoretic. No erythema. No pallor.   Incision healing well      NEUROLOGICAL EXAMINATION:     MENTAL STATUS   Oriented to person, place, and time.     CRANIAL NERVES     CN III, IV, VI   Pupils are equal, round, and reactive to light.  Extraocular motions are normal.       Assessment/Plan:      Saman Crockett is a 88 y.o. male admitted to inpatient rehabilitation on 8/9/2017 for <principal problem not specified> with impaired mobility and ADLs. Patient remains appropriate for PT, OT, and as required Speech therapy. Patient continues to require 24 hour nursing care as well as daily Physician  assessment.    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] 08/09/2017 Not Applicable      Problems Resolved During this Admission:    Diagnosis Date Noted Date Resolved POA   s/p R TIFFANIE, cont PT, OT  Pain ,managed with current meds  DVT prophylaxis, cont sq Lovenox  DM, accu check noted, cont current meds  HTN, vitals noted, cont current meds        DISCHARGE PLANNING:  Tentative Discharge Date:     Future Appointments  Date Time Provider Department Center   8/30/2017 1:00 PM Rajan Munson MD [facility] [address]       Laura Gutierrez MD  Department of Physical Medicine & Rehab  [facility]

## 2017-08-16 NOTE — PLAN OF CARE
Problem: SLP Goal  Goal: SLP Goal  Long term goals:  1. Pt will use appropriate memory strategies to schedule and recall weekly activities, express needs and recall names to maintain safety and participate socially in functional living environment.     Short term goals:  1. Pt will demonstrate use of memory strategies with 80% acc given verbal and/or visual cues with 80% acc following review.  2. Pt will recall a 3 part functional message related to ADL's with min verbal and visual assistance immediately after review with 80% acc.  3. Pt will recall daily activities via retelling/answering questions with 80% acc given min verbal and visual assistance   4. Pt will use external memory aids and compensatory strategies to recall routine, personal information, and recent events to improve orientation to time & recall daily events with 80% acc and min verbal and/or visual cues.   5. Pt will participate in ongoing assessment of reasoning, problem solving and safety awareness skills    Outcome: Ongoing (interventions implemented as appropriate)  Pt downgraded to nectar thick liquids. Unable to objectively rule out presence of aspiration/penetration at bedside warranting MBSS. Will consult w/ MD

## 2017-08-16 NOTE — PLAN OF CARE
08/16/17 0800   Patient Assessment/Suction   Level of Consciousness (AVPU) alert   All Lung Fields Breath Sounds clear;equal bilaterally   Rhythm/Pattern, Respiratory pattern regular;unlabored   PRE-TX-O2-ETCO2   O2 Device (Oxygen Therapy) room air   SpO2 97 %   Pulse Oximetry Type Intermittent   $ Pulse Oximetry - Multiple Charge Pulse Oximetry - Multiple   Pulse 62   Resp 18

## 2017-08-16 NOTE — PLAN OF CARE
Problem: Patient Care Overview  Goal: Plan of Care Review  Outcome: Ongoing (interventions implemented as appropriate)  Pt alert with forgetfulness. Min assist with transfers. Walks to BR with walker. Liquids changed to nectar thickened. POC reviewed.

## 2017-08-17 LAB
ALBUMIN SERPL BCP-MCNC: 2.7 G/DL
ALP SERPL-CCNC: 99 U/L
ALT SERPL W/O P-5'-P-CCNC: 29 U/L
ANION GAP SERPL CALC-SCNC: 10 MMOL/L
AST SERPL-CCNC: 55 U/L
BILIRUB SERPL-MCNC: 0.5 MG/DL
BUN SERPL-MCNC: 34 MG/DL
CALCIUM SERPL-MCNC: 9.4 MG/DL
CHLORIDE SERPL-SCNC: 108 MMOL/L
CO2 SERPL-SCNC: 22 MMOL/L
CREAT SERPL-MCNC: 1.3 MG/DL
ERYTHROCYTE [DISTWIDTH] IN BLOOD BY AUTOMATED COUNT: 14.4 %
EST. GFR  (AFRICAN AMERICAN): 56 ML/MIN/1.73 M^2
EST. GFR  (NON AFRICAN AMERICAN): 49 ML/MIN/1.73 M^2
GLUCOSE SERPL-MCNC: 85 MG/DL
HCT VFR BLD AUTO: 31.8 %
HGB BLD-MCNC: 10.7 G/DL
MCH RBC QN AUTO: 30.7 PG
MCHC RBC AUTO-ENTMCNC: 33.6 G/DL
MCV RBC AUTO: 91 FL
PLATELET # BLD AUTO: 567 K/UL
PLATELET BLD QL SMEAR: ABNORMAL
PMV BLD AUTO: 6.3 FL
POCT GLUCOSE: 153 MG/DL (ref 70–110)
POCT GLUCOSE: 178 MG/DL (ref 70–110)
POCT GLUCOSE: 179 MG/DL (ref 70–110)
POCT GLUCOSE: 181 MG/DL (ref 70–110)
POCT GLUCOSE: 59 MG/DL (ref 70–110)
POCT GLUCOSE: 99 MG/DL (ref 70–110)
POTASSIUM SERPL-SCNC: 4.5 MMOL/L
PROT SERPL-MCNC: 6.6 G/DL
RBC # BLD AUTO: 3.48 M/UL
SODIUM SERPL-SCNC: 140 MMOL/L
WBC # BLD AUTO: 12.5 K/UL

## 2017-08-17 PROCEDURE — 63600175 PHARM REV CODE 636 W HCPCS: Performed by: PHYSICAL MEDICINE & REHABILITATION

## 2017-08-17 PROCEDURE — 92526 ORAL FUNCTION THERAPY: CPT

## 2017-08-17 PROCEDURE — 25000003 PHARM REV CODE 250: Performed by: PHYSICAL MEDICINE & REHABILITATION

## 2017-08-17 PROCEDURE — 80053 COMPREHEN METABOLIC PANEL: CPT

## 2017-08-17 PROCEDURE — 85027 COMPLETE CBC AUTOMATED: CPT

## 2017-08-17 PROCEDURE — 97116 GAIT TRAINING THERAPY: CPT

## 2017-08-17 PROCEDURE — 36415 COLL VENOUS BLD VENIPUNCTURE: CPT

## 2017-08-17 PROCEDURE — 97110 THERAPEUTIC EXERCISES: CPT

## 2017-08-17 PROCEDURE — 97530 THERAPEUTIC ACTIVITIES: CPT

## 2017-08-17 PROCEDURE — 97532 *HC SP COG SKL DEV EA 15 MIN: CPT

## 2017-08-17 PROCEDURE — 12800000 HC REHAB SEMI-PRIVATE ROOM

## 2017-08-17 PROCEDURE — 92507 TX SP LANG VOICE COMM INDIV: CPT

## 2017-08-17 RX ORDER — SODIUM CHLORIDE 9 MG/ML
INJECTION, SOLUTION INTRAVENOUS CONTINUOUS
Status: DISCONTINUED | OUTPATIENT
Start: 2017-08-17 | End: 2017-08-19

## 2017-08-17 RX ADMIN — ACETAMINOPHEN 650 MG: 325 TABLET, FILM COATED ORAL at 09:08

## 2017-08-17 RX ADMIN — GEMFIBROZIL 600 MG: 600 TABLET ORAL at 04:08

## 2017-08-17 RX ADMIN — METFORMIN HYDROCHLORIDE 750 MG: 750 TABLET, EXTENDED RELEASE ORAL at 04:08

## 2017-08-17 RX ADMIN — ENALAPRIL MALEATE 2.5 MG: 2.5 TABLET ORAL at 08:08

## 2017-08-17 RX ADMIN — SODIUM CHLORIDE: 0.9 INJECTION, SOLUTION INTRAVENOUS at 09:08

## 2017-08-17 RX ADMIN — METFORMIN HYDROCHLORIDE 750 MG: 750 TABLET, EXTENDED RELEASE ORAL at 08:08

## 2017-08-17 RX ADMIN — LEVOTHYROXINE SODIUM 150 MCG: 150 TABLET ORAL at 05:08

## 2017-08-17 RX ADMIN — MEMANTINE HYDROCHLORIDE 28 MG: 28 CAPSULE, EXTENDED RELEASE ORAL at 09:08

## 2017-08-17 RX ADMIN — THERA TABS 1 TABLET: TAB at 08:08

## 2017-08-17 RX ADMIN — FERROUS GLUCONATE 324 MG: 324 TABLET ORAL at 08:08

## 2017-08-17 RX ADMIN — ENOXAPARIN SODIUM 40 MG: 100 INJECTION SUBCUTANEOUS at 04:08

## 2017-08-17 RX ADMIN — ASPIRIN 325 MG ORAL TABLET 325 MG: 325 PILL ORAL at 08:08

## 2017-08-17 RX ADMIN — ASPIRIN 325 MG ORAL TABLET 325 MG: 325 PILL ORAL at 09:08

## 2017-08-17 RX ADMIN — TAMSULOSIN HYDROCHLORIDE 0.4 MG: 0.4 CAPSULE ORAL at 08:08

## 2017-08-17 RX ADMIN — GEMFIBROZIL 600 MG: 600 TABLET ORAL at 05:08

## 2017-08-17 RX ADMIN — ESCITALOPRAM 10 MG: 10 TABLET, FILM COATED ORAL at 08:08

## 2017-08-17 NOTE — PROGRESS NOTES
08/17/17 1042   PRE-TX-O2-ETCO2   O2 Device (Oxygen Therapy) room air   SpO2 95 %   Pulse Oximetry Type Intermittent

## 2017-08-17 NOTE — PT/OT/SLP PROGRESS
"Speech Language Pathology Treatment          Saman Crockett   MRN: [id number]     Diet recommendations: Solid Diet Level: Regular  Liquid Diet Level: Nectar Thick   · No straws   · Small bites/sips   · Alternating bites/sips   · Meds whole buried in puree   · Eliminate distractions   · Assistance with thickening liquids  · Given Pt noncompliance w/ thickened water, recommend initiation and reinforcement of [place] Water Protocol  · unthickend water allowed ONLY between meals (at least 30 minutes after PO intake)     SLP Treatment Date: 08/17/17  Speech Start Time: 0915     Speech Stop Time: 1020     Speech Total (min): 65 min       TREATMENT BILLABLE MINUTES:  Speech Therapy Individual 45 and Treatment Swallowing Dysfunction 20    General Precautions: Standard, aspiration, nectar thick, fall        Subjective:  "I'm ready to get out of this place. I can sit at home just as well as I can sit here"    Objective:    Patient found with: peripheral IV  1. Pt will demonstrate use of memory strategies with 80% acc given verbal and/or visual cues with 80% acc following review.  2. Pt will recall daily activities via retelling/answering questions with 80% acc given min verbal and visual assistance   3. Pt will use external memory aids and compensatory strategies to recall routine, personal information, and recent events to improve orientation to time & recall daily events with 80% acc and min verbal and/or visual cues.        Assessment:  Pt presents with severe short term memory deficits and dysphagia. Monthly calendar visible from bedside to enhance orientation to date, place, therapy schedule and staff names. Use of calendar to enhance orientation was thoroughly reviewed with Pt instructed to refer throughout therapy session. Pt required visual cue to utilize following 5 minute delay x5. Pt repeatedly stating he has no interest in utilizing aids to enhance memory. Pt instruced on strategies to enhance orientation through " environmental aids such as clock however, Pt unreceptive to suggestions despite max encouragement and explanation of benefits. Pt provided with written game instructions to compensate for memory throughout task. Across 10 minute game, Pt required 6 verbal cues to reference instructions for task expectations.    Pt presented with thin liquid (water). Across 3 single sips, Pt with immediate cough response following initial swallow, wet vocal quality observed on second with no s/s of aspiration or changes in vocal quality on 3rd trial. Pt consumed full 4 ounce nectar thick liquid with no overt s/s of aspiration or changes in vocal quality. Given Pt c/o thickened water, ultimately requiring external fluids 2/2 insufficient water intake, recommend initiation of [place] water protocol with all other liquids and liquids with meals thickened to nectar thick consistency.     Diet recommendations:        Liquid Diet Level: Nectar Thick  Solid: Regular     Discharge recommendations: Discharge Facility/Level Of Care Needs: home     Goals:    SLP Goals        Problem: SLP Goal    Goal Priority Disciplines Outcome   SLP Goal     SLP Ongoing (interventions implemented as appropriate)   Description:  Long term goals:  1. Pt will use appropriate memory strategies to schedule and recall weekly activities, express needs and recall names to maintain safety and participate socially in functional living environment.     Short term goals:  1. Pt will demonstrate use of memory strategies with 80% acc given verbal and/or visual cues with 80% acc following review.  2. Pt will recall a 3 part functional message related to ADL's with min verbal and visual assistance immediately after review with 80% acc.  3. Pt will recall daily activities via retelling/answering questions with 80% acc given min verbal and visual assistance   4. Pt will use external memory aids and compensatory strategies to recall routine, personal information, and recent  events to improve orientation to time & recall daily events with 80% acc and min verbal and/or visual cues.   5. Pt will participate in ongoing assessment of reasoning, problem solving and safety awareness skills                      Plan:   Patient to be seen Therapy Frequency: 5 x/week   Plan of Care Expires: 08/23/17  Plan of Care reviewed with: patient, spouse  SLP Follow-up?: Yes  SLP - Next Visit Date: 08/18/17           ST Berna 8/17/2017

## 2017-08-17 NOTE — PT/OT/SLP PROGRESS
Physical Therapy         Treatment        Saman Crockett   MRN: [id number]     PT Received On: 17  Total Time (min): 60        Billable Minutes:  Gait [id number], Therapeutic Exercise 40  Total Minutes: 60    Treatment Type: Treatment  PT/PTA: PT     PTA Visit Number: 0       General Precautions: Standard, fall  Orthopedic Precautions: Orthopedic Precautions : RLE weight bearing as tolerated, RLE anterior precautions     Subjective:    Pain/Comfort  Pain Rating 1: 410  Location - Side 1: Right  Location 1: hip  Pain Addressed 1: Reposition, Distraction, Cessation of Activity, Nurse notified  Pain Rating Post-Intervention 1: 10    Objective:  Patient found supine in bed, in NAD.       Functional Mobility:  Bed Mobility:   Supine to sit: Minimal Assistance for RLE     Transfer Training:  Sit to stand:Contact Guard Assistance with Rolling Walker  x 7  Bed <> Chair:  Stand Pivot with Contact Guard Assistance with Rolling Walker      Wheelchair Trainin' with SBA and cues    Gait Trainin' x 2, 60' x 1, 150' x 1 with RW with CGA and cues    Additional Treatment:  SEATED: LAQ and hip flexion (L with 3#), hip abduction with yellow tband, hip adduction with ball, ankle DF/PF, knee flexion with red tband, x 30 reps each  SUPINE: SLR (R with max assist) x 10 reps each  STANDING: ankle PF, minisquats, x 10 reps each       Activity Tolerance:  Patient limited by pain    Patient left up in chair with chair alarm on, nurse notified and family present. In dining area.    Assessment:  Saman Crockett is a 88 y.o. male     Rehab potential is good.    Activity tolerance: Fair       GOALS:    Physical Therapy Goals        Problem: Physical Therapy Goal    Goal Priority Disciplines Outcome Goal Variances Interventions   Physical Therapy Goal     PT/OT, PT Ongoing (interventions implemented as appropriate)     Description:  Goals to be met by: 2017     Patient will increase functional independence with mobility by  performin. Supine to sit with Stand-by Assistance  2. Sit to supine with Stand-by Assistance  3. Sit to stand transfer with Stand-by Assistance  4. Bed to chair transfer with Stand-by Assistance using Rolling Walker  5. Gait  x 200 feet with Stand-by Assistance using Rolling Walker.   6. Wheelchair propulsion x 200 feet with Modified [place] using bilateral uppper extremities  7. Ascend/descend at least 4 stair with left Handrail Contact Guard Assistance.                      PLAN:    Patient to be seen daily  to address the above listed problems via gait training, therapeutic activities, therapeutic exercises, neuromuscular re-education, wheelchair management/training  Plan of Care expires: 17  Plan of Care reviewed with: patient         Jeffrey ZUNIGA Katherine, PT 2017

## 2017-08-17 NOTE — PLAN OF CARE
Problem: Occupational Therapy Goal  Goal: Occupational Therapy Goal  Goals to be met by: 8/24/17     Patient will increase functional independence with ADLs by performing:    Feeding with [employer].  UE Dressing with Supervision.  LE Dressing with Minimal Assistance with DME/AE as needed.  Grooming while seated with [employer].  Toileting from toilet with Moderate Assistance for hygiene and clothing management.   Bathing from shower chair/bench with Minimal Assistance.  Shower transfer with Supervision.  Toilet transfer to toilet with Modified [employer].      Outcome: Revised  OT goals revised on this date by this OT.

## 2017-08-17 NOTE — PT/OT/SLP PROGRESS
"Occupational Therapy  Treatment    Saman Crockett   MRN: [id number]   Admitting Diagnosis: femur fx with displacement s/p R hip arthroplasty     OT Date of Treatment: 08/17/17   Total Time (min): 55 min    Billable Minutes:  Therapeutic Activity 30 and Therapeutic Exercise 25  Total Minutes: 55    General Precautions: Standard, aspiration, fall, nectar thick  Orthopedic Precautions: RLE weight bearing as tolerated, RLE anterior precautions  Braces: N/A    Do you have any cultural, spiritual, Rastafari conflicts, given your current situation?: None    Subjective:  Communicated with nurse prior to session.  "I am not going anywhere. I am not getting out of this bed. No. Come back tomorrow or something. I'm tired. I want to sleep. " - pt's response when asked to wake up to participate in remainder of OT session     Pain/Comfort  Pain Rating 1:  (Unrated pain of R LE and c/o sleepiness during AM and PM OT sessions today )  Pain Addressed 1: Nurse notified  Pain Rating Post-Intervention 1: 0/10    Objective:  Patient found with: peripheral IV (w/c alarm)    Functional Mobility:  Bed Mobility:   Sit to supine: Moderate Assistance    Transfer Training:   Sit to stand:Stand-by Assistance with Rolling Walker verbal cues for safety  Bed <> Chair:  Stand Pivot with Minimal Assistance with Rolling Walker verbal cues for safety    Feeding:  Pt drinking small cup of coffee with some coughing after completely finished drinking - no coughing while drinking - SLP notified; pt set up with more coffee at pt dining room table as pt reports he would like to drink it at the dining table; however, pt spilling coffee on the floor     LE Dressing:  Patient performed don/doffed pants with Stand-by Assistance with use of reacher as needed to thread feet through clothing and use of RW in standing to manage clothing over hips with CGA    Additional Treatment:  AM x 40: - Wheelchair propulsion > 25 ft with SBA; with bilateral UE 1# wrist weights, " pt engaging and participating in card games (go fish and freeman) to facilitate UE strength/endurance and attention   PM: x 15 mins - Pt adamantly refusing to participate in OT session in the gym as he is tired and wants to sleep - pt agreeing to participate from bed level with HOB elevated; pt performing the following therapeutic exercises 2 sets x 10 reps with rest breaks as needed: 3# dowel for biceps/triceps strengthening and red t band for isolated biceps/triceps strengthening, shoulder ABD     Patient left HOB elevated with all lines intact, call button in reach, bed alarm on and nurse notified    ASSESSMENT:  Saman Crockett is a 88 y.o. male with a medical diagnosis of femur fx with displacement s/p R hip arthroplasty and demonstrates improvements with LB dressing tasks as pt initiating use of reacher with min verbal cues to thread long pants with SBA. Patient reports increased sleepiness throughout both AM and PM sessions - nurse notified. Patient requires max encouragement to participate in PM OT session but did end up participating from bed level.      Rehab potential is good    Activity tolerance: Fair    Discharge recommendations: home     Equipment recommendations:  (tbd)     GOALS:    Occupational Therapy Goals        Problem: Occupational Therapy Goal    Goal Priority Disciplines Outcome Interventions   Occupational Therapy Goal     OT, PT/OT Revised    Description:  Goals to be met by: 8/24/17     Patient will increase functional independence with ADLs by performing:    Feeding with [place].  UE Dressing with Supervision.  LE Dressing with Minimal Assistance with DME/AE as needed.  Grooming while seated with [place].  Toileting from toilet with Moderate Assistance for hygiene and clothing management.   Bathing from shower chair/bench with Minimal Assistance.  Shower transfer with Supervision.  Toilet transfer to toilet with Modified [place].                        Plan:  Patient to be  seen 6 x/week to address the above listed problems via self-care/home management, community/work re-entry, therapeutic activities, therapeutic exercises, therapeutic groups, wheelchair management/training  Plan of Care expires: 08/24/17  Plan of Care reviewed with: patient    Chica CHEL May, BRAYDEN  08/17/2017

## 2017-08-17 NOTE — PLAN OF CARE
Problem: SLP Goal  Goal: SLP Goal  Long term goals:  1. Pt will use appropriate memory strategies to schedule and recall weekly activities, express needs and recall names to maintain safety and participate socially in functional living environment.     Short term goals:  1. Pt will demonstrate use of memory strategies with 80% acc given verbal and/or visual cues with 80% acc following review.  2. Pt will recall a 3 part functional message related to ADL's with min verbal and visual assistance immediately after review with 80% acc.  3. Pt will recall daily activities via retelling/answering questions with 80% acc given min verbal and visual assistance   4. Pt will use external memory aids and compensatory strategies to recall routine, personal information, and recent events to improve orientation to time & recall daily events with 80% acc and min verbal and/or visual cues.   5. Pt will participate in ongoing assessment of reasoning, problem solving and safety awareness skills    Outcome: Ongoing (interventions implemented as appropriate)  Pt presented with thin liquid (water). Across 3 single sips, Pt with immediate cough response following initial swallow, wet vocal quality observed on second with no s/s of aspiration or changes in vocal quality on 3rd trial. Pt consumed full 4 ounce nectar thick liquid with no overt s/s of aspiration or changes in vocal quality. Given Pt c/o thickened water, ultimately requiring external fluids 2/2 insufficient water intake, recommend initiation of [place] water protocol with all other liquids and liquids with meals thickened to nectar thick consistency.

## 2017-08-17 NOTE — PLAN OF CARE
Problem: Pain, Acute (Adult)  Goal: Identify Related Risk Factors and Signs and Symptoms  Related risk factors and signs and symptoms are identified upon initiation of Human Response Clinical Practice Guideline (CPG)   Outcome: Ongoing (interventions implemented as appropriate)  Pt recently with right hip fx. Min assist with transfers. Less pain today. No request for pain meds.

## 2017-08-17 NOTE — NURSING
0455 Blood Sugar 59. Patient asymptomatic. [place]/Orange juice given. 0534 Blood sugar up to 99

## 2017-08-17 NOTE — PROGRESS NOTES
[facility]  Physical Medicine & Rehab  Progress Note    Patient Name: Saman Crockett  MRN: [id number]  Patient Class: IP- Rehab   Admission Date: 8/9/2017  Length of Stay: 8 days  Attending Physician: Laura Gutierrez MD  Primary Care Provider: Rajan Munson MD    Subjective:     Principal Problem: s/p R TIFFANIE   Interval History: pt is 88 y.o male participating with therapy. Pt with elevated bun, will administer IVF    Scheduled Medications:  aspirin  325 mg Oral BID    enalapril  2.5 mg Oral Daily    enoxaparin  40 mg Subcutaneous Daily    escitalopram oxalate  10 mg Oral Daily    ferrous gluconate  324 mg Oral Daily with breakfast    gemfibrozil  600 mg Oral BID AC    glipiZIDE  5 mg Oral Daily with breakfast    insulin detemir  18 Units Subcutaneous QHS    levothyroxine  150 mcg Oral Before breakfast    memantine  28 mg Oral Daily    metformin  750 mg Oral BID WM    multivitamin  1 tablet Oral Daily    tamsulosin  0.4 mg Oral Daily       PRN Medications: sodium chloride, acetaminophen, aluminum-magnesium hydroxide-simethicone, bisacodyl, hydrocodone-acetaminophen 5-325mg, magnesium citrate    Review of Systems   Constitutional: Negative.    HENT: Negative.    Eyes: Negative.    Respiratory: Negative.    Cardiovascular: Negative.    Gastrointestinal: Negative.    Endocrine: Negative.    Genitourinary: Negative.    Musculoskeletal: Negative.    Skin: Negative.    Allergic/Immunologic: Negative.    Neurological: Negative.    Hematological: Negative.    Psychiatric/Behavioral: Negative.      Objective:     Vital Signs (Most Recent):  Temp: 97.1 °F (36.2 °C) (08/17/17 0500)  Pulse: 61 (08/17/17 0500)  Resp: 18 (08/16/17 2020)  BP: (!) 165/72 (08/17/17 0500)  SpO2: 95 % (08/17/17 0500)    Vital Signs (24h Range):  Temp:  [97.1 °F (36.2 °C)-98.7 °F (37.1 °C)] 97.1 °F (36.2 °C)  Pulse:  [61-66] 61  Resp:  [18-20] 18  SpO2:  [95 %-99 %] 95 %  BP: (136-165)/(60-72) 165/72     Physical Exam    Constitutional: He is oriented to person, place, and time. He appears well-developed and well-nourished. No distress.   HENT:   Head: Normocephalic and atraumatic.   Right Ear: External ear normal.   Left Ear: External ear normal.   Nose: Nose normal.   Mouth/Throat: Oropharynx is clear and moist. No oropharyngeal exudate.   Eyes: Conjunctivae and EOM are normal. Pupils are equal, round, and reactive to light. Right eye exhibits no discharge. Left eye exhibits no discharge. No scleral icterus.   Neck: Normal range of motion. Neck supple. No JVD present. No tracheal deviation present. No thyromegaly present.   Cardiovascular: Normal rate, regular rhythm, normal heart sounds and intact distal pulses.  Exam reveals no gallop and no friction rub.    No murmur heard.  Pulmonary/Chest: Effort normal and breath sounds normal. No stridor. No respiratory distress. He has no wheezes. He has no rales. He exhibits no tenderness.   Abdominal: Soft. Bowel sounds are normal. He exhibits no distension and no mass. There is no tenderness. There is no rebound and no guarding. No hernia.   Genitourinary:   Genitourinary Comments: deferred   Musculoskeletal: Normal range of motion. He exhibits no edema, tenderness or deformity.   Lymphadenopathy:     He has no cervical adenopathy.   Neurological: He is alert and oriented to person, place, and time. He exhibits normal muscle tone.   Skin: No rash noted. He is not diaphoretic. No erythema. No pallor.   Incision healing well   Psychiatric: He has a normal mood and affect. His behavior is normal. Judgment and thought content normal.     NEUROLOGICAL EXAMINATION:     MENTAL STATUS   Oriented to person, place, and time.     CRANIAL NERVES     CN III, IV, VI   Pupils are equal, round, and reactive to light.  Extraocular motions are normal.       Assessment/Plan:      Saman Crockett is a 88 y.o. male admitted to inpatient rehabilitation on 8/9/2017 for <principal problem not specified>  with impaired mobility and ADLs. Patient remains appropriate for PT, OT, and as required Speech therapy. Patient continues to require 24 hour nursing care as well as daily Physician assessment.    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] 08/09/2017 Not Applicable      Problems Resolved During this Admission:    Diagnosis Date Noted Date Resolved POA     S/p R TIFFANIE , cont PT, OT  DVT prophylaxis, cont sq Lovenox  HTN, vitals noted, cont current meds  Anemia, stable  Elevated BUN, initiate IV fluid  DM, accu check noted, cont current meds      DISCHARGE PLANNING:  Tentative Discharge Date:     Future Appointments  Date Time Provider Department Center   8/30/2017 1:00 PM Rajan Munson MD [facility] [address]       Laura Gutierrez MD  Department of Physical Medicine & Rehab  [facility]

## 2017-08-17 NOTE — PLAN OF CARE
Problem: Patient Care Overview  Goal: Plan of Care Review  Outcome: Ongoing (interventions implemented as appropriate)  Patient awake/alert. Remains very forgetful. Currently on Nectar thickened liquids.Generalized weakness noted. Free from falls. Plan of care continued

## 2017-08-18 LAB
ANION GAP SERPL CALC-SCNC: 10 MMOL/L
BUN SERPL-MCNC: 34 MG/DL
CALCIUM SERPL-MCNC: 9.3 MG/DL
CHLORIDE SERPL-SCNC: 107 MMOL/L
CO2 SERPL-SCNC: 21 MMOL/L
CREAT SERPL-MCNC: 1.4 MG/DL
EST. GFR  (AFRICAN AMERICAN): 51 ML/MIN/1.73 M^2
EST. GFR  (NON AFRICAN AMERICAN): 45 ML/MIN/1.73 M^2
GLUCOSE SERPL-MCNC: 101 MG/DL
POCT GLUCOSE: 104 MG/DL (ref 70–110)
POCT GLUCOSE: 121 MG/DL (ref 70–110)
POCT GLUCOSE: 128 MG/DL (ref 70–110)
POCT GLUCOSE: 144 MG/DL (ref 70–110)
POTASSIUM SERPL-SCNC: 4.5 MMOL/L
SODIUM SERPL-SCNC: 138 MMOL/L

## 2017-08-18 PROCEDURE — 97535 SELF CARE MNGMENT TRAINING: CPT

## 2017-08-18 PROCEDURE — 63600175 PHARM REV CODE 636 W HCPCS: Performed by: PHYSICAL MEDICINE & REHABILITATION

## 2017-08-18 PROCEDURE — 80048 BASIC METABOLIC PNL TOTAL CA: CPT

## 2017-08-18 PROCEDURE — 25000003 PHARM REV CODE 250: Performed by: PHYSICAL MEDICINE & REHABILITATION

## 2017-08-18 PROCEDURE — 92507 TX SP LANG VOICE COMM INDIV: CPT

## 2017-08-18 PROCEDURE — 97110 THERAPEUTIC EXERCISES: CPT

## 2017-08-18 PROCEDURE — 36415 COLL VENOUS BLD VENIPUNCTURE: CPT

## 2017-08-18 PROCEDURE — 27000221 HC OXYGEN, UP TO 24 HOURS

## 2017-08-18 PROCEDURE — 92526 ORAL FUNCTION THERAPY: CPT

## 2017-08-18 PROCEDURE — 97802 MEDICAL NUTRITION INDIV IN: CPT

## 2017-08-18 PROCEDURE — 12800000 HC REHAB SEMI-PRIVATE ROOM

## 2017-08-18 PROCEDURE — 94761 N-INVAS EAR/PLS OXIMETRY MLT: CPT

## 2017-08-18 PROCEDURE — 97530 THERAPEUTIC ACTIVITIES: CPT

## 2017-08-18 PROCEDURE — 97116 GAIT TRAINING THERAPY: CPT

## 2017-08-18 RX ADMIN — METFORMIN HYDROCHLORIDE 750 MG: 750 TABLET, EXTENDED RELEASE ORAL at 04:08

## 2017-08-18 RX ADMIN — LEVOTHYROXINE SODIUM 150 MCG: 150 TABLET ORAL at 07:08

## 2017-08-18 RX ADMIN — GEMFIBROZIL 600 MG: 600 TABLET ORAL at 07:08

## 2017-08-18 RX ADMIN — GEMFIBROZIL 600 MG: 600 TABLET ORAL at 04:08

## 2017-08-18 RX ADMIN — HYDROCODONE BITARTRATE AND ACETAMINOPHEN 2 TABLET: 5; 325 TABLET ORAL at 10:08

## 2017-08-18 RX ADMIN — FERROUS GLUCONATE 324 MG: 324 TABLET ORAL at 07:08

## 2017-08-18 RX ADMIN — ACETAMINOPHEN 650 MG: 325 TABLET, FILM COATED ORAL at 01:08

## 2017-08-18 RX ADMIN — ENOXAPARIN SODIUM 40 MG: 100 INJECTION SUBCUTANEOUS at 04:08

## 2017-08-18 RX ADMIN — GLIPIZIDE 5 MG: 5 TABLET, FILM COATED, EXTENDED RELEASE ORAL at 08:08

## 2017-08-18 RX ADMIN — MEMANTINE HYDROCHLORIDE 28 MG: 28 CAPSULE, EXTENDED RELEASE ORAL at 08:08

## 2017-08-18 RX ADMIN — ENALAPRIL MALEATE 2.5 MG: 2.5 TABLET ORAL at 08:08

## 2017-08-18 RX ADMIN — ASPIRIN 325 MG ORAL TABLET 325 MG: 325 PILL ORAL at 08:08

## 2017-08-18 RX ADMIN — ESCITALOPRAM 10 MG: 10 TABLET, FILM COATED ORAL at 08:08

## 2017-08-18 RX ADMIN — METFORMIN HYDROCHLORIDE 750 MG: 750 TABLET, EXTENDED RELEASE ORAL at 07:08

## 2017-08-18 RX ADMIN — TAMSULOSIN HYDROCHLORIDE 0.4 MG: 0.4 CAPSULE ORAL at 08:08

## 2017-08-18 RX ADMIN — THERA TABS 1 TABLET: TAB at 08:08

## 2017-08-18 RX ADMIN — ASPIRIN 325 MG ORAL TABLET 325 MG: 325 PILL ORAL at 09:08

## 2017-08-18 NOTE — CONSULTS
INPATIENT NEPHROLOGY CONSULT   [facility] NEPHROLOGY    Saman Crockett  08/18/2017    Reason for consultation:  ckd III    Chief Complaint: No chief complaint on file.         History of Present Illness:  Pt with h/o Significant for chronic kidney disease stage III, anemia,   hypertension, insulin-dependent type 2 diabetes, hypothyroidism, and coronary   artery disease is s/p hip orif.  Had had alanna post op but is now at baseline.  12 systems reviewed and his only complaints were he's tired and his leg aches when he walks        Plan of Care:       Assessment:       Plan:    1. Acute Kidney Injury-at baseline.  No nsaids,  Keep hydrated    2. Volume/Blood Pressure-keep map greater than 55     3. Electrolytes/Acid Base-monitor for hyperkalemia    4. Bone Mineral Metabolism-no active issues    5. Anemia of CKD-outpt sherly    6. Medications- Avoid NSAIDS, COXIBS, iv radiocontrast, and aminoglycoside antibiotics when feasible.  Renal dose medication for crcl 10-50           Thank you for allowing us to participate in this patient's care. We will continue to follow.    Vital Signs:  Temp Readings from Last 3 Encounters:   08/18/17 97.8 °F (36.6 °C) (Oral)   05/31/17 97.7 °F (36.5 °C) (Tympanic)   05/04/15 97.9 °F (36.6 °C) (Oral)       Pulse Readings from Last 3 Encounters:   08/18/17 (!) 53   05/31/17 68   05/04/15 (!) 54       BP Readings from Last 3 Encounters:   08/18/17 (!) 163/68   05/31/17 (!) 148/60   05/04/15 (!) 152/68       Weight:  Wt Readings from Last 3 Encounters:   08/18/17 83.6 kg (184 lb 4.9 oz)   05/31/17 82.8 kg (182 lb 8 oz)   05/04/15 87.6 kg (193 lb 1.6 oz)       Past Medical & Surgical History:  Past Medical History:   Diagnosis Date    Diabetes mellitus     Elevated PSA     Hyperlipidemia     Kidney stone     Thyroid disease        Past Surgical History:   Procedure Laterality Date    LITHOTRIPSY      tonisilectomy         Past Social History:  Social History     Social History    Marital  status:      Spouse name: N/A    Number of children: N/A    Years of education: N/A     Social History Main Topics    Smoking status: Former Smoker     Types: Cigarettes     Quit date: 8/14/1973    Smokeless tobacco: Never Used    Alcohol use No    Drug use: No    Sexual activity: Not Asked     Other Topics Concern    None     Social History Narrative    None       Medications:  No current facility-administered medications on file prior to encounter.      Current Outpatient Prescriptions on File Prior to Encounter   Medication Sig Dispense Refill    aspirin 81 MG Chew Take 81 mg by mouth once daily.      enalapril (VASOTEC) 2.5 MG tablet       escitalopram oxalate (LEXAPRO) 10 MG tablet       ferrous gluconate (FERGON) 325 MG Tab Take 1 tablet (325 mg total) by mouth daily with breakfast. 30 tablet 5    gemfibrozil (LOPID) 600 MG tablet Take 600 mg by mouth 2 (two) times daily before meals.      glipiZIDE (GLUCOTROL) 5 MG tablet Take 5 mg by mouth before breakfast.      glipiZIDE (GLUCOTROL) 5 MG TR24       hydrOXYzine HCl (ATARAX) 25 MG tablet Take 1 tablet (25 mg total) by mouth nightly as needed for Itching. 30 tablet 1    LANTUS SOLOSTAR 100 unit/mL (3 mL) InPn pen 30 Units every evening.       levothyroxine (SYNTHROID) 125 MCG tablet Take 1 tablet (125 mcg total) by mouth once daily. 30 tablet 11    memantine (NAMENDA XR) 28 mg CSpX Take 1 capsule (28 mg total) by mouth once daily. 30 capsule 11    metformin (GLUCOPHAGE-XR) 750 MG 24 hr tablet Take 750 mg by mouth 2 (two) times daily with meals.      mometasone (ELOCON) 0.1 % ointment Apply topically once daily. 60 g 2    MULTIVITS-MINERALS/FA/LYCOPENE (ONE-A-DAY MEN'S ORAL) Take 1 tablet by mouth once daily.      simvastatin (ZOCOR) 20 MG tablet       tamsulosin (FLOMAX) 0.4 mg Cp24 Take 0.4 mg by mouth once daily.       Scheduled Meds:   aspirin  325 mg Oral BID    enalapril  2.5 mg Oral Daily    enoxaparin  40 mg  Subcutaneous Daily    escitalopram oxalate  10 mg Oral Daily    ferrous gluconate  324 mg Oral Daily with breakfast    gemfibrozil  600 mg Oral BID AC    glipiZIDE  5 mg Oral Daily with breakfast    insulin detemir  18 Units Subcutaneous QHS    levothyroxine  150 mcg Oral Before breakfast    memantine  28 mg Oral Daily    metformin  750 mg Oral BID WM    multivitamin  1 tablet Oral Daily    tamsulosin  0.4 mg Oral Daily     Continuous Infusions:   sodium chloride 0.9% 75 mL/hr at 08/18/17 0700     PRN Meds:.sodium chloride, acetaminophen, aluminum-magnesium hydroxide-simethicone, bisacodyl, hydrocodone-acetaminophen 5-325mg, magnesium citrate    Allergies:  Levaquin [levofloxacin]    Past Family History:  Reviewed; refer to Hospitalist Admission Note    Review of Systems:  Review of Systems - All 14 systems reviewed and negative, except as noted in HPI    Physical Exam:    BP (!) 163/68   Pulse (!) 53   Temp 97.8 °F (36.6 °C) (Oral)   Resp 18   Ht 6' (1.829 m)   Wt 83.6 kg (184 lb 4.9 oz)   SpO2 (!) 93%   BMI 25.00 kg/m²     General Appearance:    Alert, cooperative, no distress, appears stated age   Head:    Normocephalic, without obvious abnormality, atraumatic   Eyes:    PER, conjunctiva/corneas clear, EOM's intact in both eyes        Throat:   Lips, mucosa, and tongue normal; teeth and gums normal   Back:     Symmetric, no curvature, ROM normal, no CVA tenderness   Lungs:     Clear to auscultation bilaterally, respirations unlabored   Chest wall:    No tenderness or deformity   Heart:    Regular rate and rhythm, S1 and S2 normal, no murmur, rub   or gallop   Abdomen:     Soft, non-tender, bowel sounds active all four quadrants,     no masses, no organomegaly   Extremities:   Extremities normal, atraumatic, no cyanosis or edema   Pulses:   2+ and symmetric all extremities   MSK:   No joint or muscle swelling, tenderness or deformity   Skin:   Skin color, texture, turgor normal, no rashes or  lesions   Neurologic:   CNII-XII intact, normal strength and sensation       Throughout.  No flap     Results:  Lab Results   Component Value Date     08/18/2017    K 4.5 08/18/2017     08/18/2017    CO2 21 (L) 08/18/2017    BUN 34 (H) 08/18/2017    CREATININE 1.4 08/18/2017    CALCIUM 9.3 08/18/2017    ANIONGAP 10 08/18/2017    ESTGFRAFRICA 51 (A) 08/18/2017    EGFRNONAA 45 (A) 08/18/2017       Lab Results   Component Value Date    CALCIUM 9.3 08/18/2017       No results for input(s): WBC, RBC, HGB, HCT, PLT, MCV, MCH, MCHC in the last 24 hours.    I have personally reviewed pertinent radiological imaging and reports.  .    Yoel Singer MD  Nephrology  [facility]  [phone]          .

## 2017-08-18 NOTE — PT/OT/SLP PROGRESS
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  "No, I don't want to get out of bed. I'm tired." - pt stating at start of AM session     Pain/Comfort  Pain Rating 1:  (c/o pain through R LE with ambulation only - unrated; resolved once seated)  Pain Addressed 1: Nurse notified, Distraction, Reposition  Pain Rating Post-Intervention 1: 0/10    Objective:  Patient found with: peripheral IV, bed alarm pt requires MAX encouragement to get out of bed at start of AM session; pt finally agreeing     Functional Mobility:  Bed Mobility:   Supine to sit: Minimal Assistancex 2 as pt found in bed at start of AM & PM sessions    Transfer Training:  During AM session:   Sit to stand:Stand-by Assistance with Rolling Walker verbal instruction for safety  Toilet Transfer:  Pt Stand Pivot with Stand-by Assistance with Rolling Walker    >>>> Patient ambulating with SBA to CGA and RW from EOB > toilet > sink > wheelchair positioned next to bed with no LOB approximately 40 ft total    During PM session:  >>>>Patient ambulating with SBA to CGA and RW from EOB > wheelchair positioned outside of pt bedroom door in hallway approximately 20 ft with no LOB     Feeding:  Patient Set-up with items to make himself a peanut butter and jelly sandwich with Supervision - pt feeding self peanut butter & jelly sandwich and " "drinking nectar thickened milk with Supervision - minimal to no coughing with meal     Grooming:  Standing sink side with Supervision with RW to wash hands after toileting    Bathing:  Pt refusing to shower when asked at start of AM session    LE Dressing:  Pt readjusting socks with Mod (A) for R sock seated EOB     Toilet Training:  Pt performed toileting with Supervision or Set-up Assistance with RW and bedside commode positioned over toilet      Balance:   Static Sit: GOOD: Takes MODERATE challenges from all directions  Dynamic Sit:  GOOD: Maintains balance through MODERATE excursions of active trunk movement  Static Stand: GOOD-: Takes MODERATE challenges from all directions inconsistently  Dynamic stand: FAIR: Needs CONTACT GUARD during gait  To FAIR+    Additional Treatment:  - PM session: Patient, pt's wife and pt's son educated on rehab therapy & schedule, rehab expectations, benefits of rest when necessary to facilitate full benefit of & progress with therapy, OOB activity, what to expect during family training (scheduled for Mon 8-21-17 at 1pm w spouse/daughter), safety awareness-pt only to transfer w trained employees, not alone or w family; pt's son expressing concern regarding pt's mobility and independence upon discharge (scheduled for 8-23-17) as it will only be pt and wife at home - OTR re-assuring son and pt's wife by reporting patient's progress thus far and that issues/concerns be addressed prior to discharge - pt's wife telling OTR that she feels much better now that they have gotten to speak with a therapist because pt's memory is "bad" and they (meaining family) does not know whether to believe what he is saying or not; OTR communicating to family that pt usually argues at start of session because he does not want to get out of bed - family verbalizes understanding  - Wheelchair propulsion > 50 ft with SBA with verbal instruction for topographical orientation   - UBE x 5 mins with moderate " resistance and no rest needed     Patient left up in chair with all lines intact, chair alarm on, nurse notified and PT present with pt in therapy gym    ASSESSMENT:  Saman Crockett is a 88 y.o. male with a medical diagnosis of femur fx with displacement s/p R hip arthroplasty and presents with daily improvements towards OT goals; however, continues to require encouragement to perform OOB activity. Patient should continue to benefit from skilled OT services per est POC to increase independence with ADL, mobility, and safety.     Rehab potential is good    Activity tolerance: Good to fair    Discharge recommendations: home     Equipment recommendations:  (TBD)     GOALS:    Occupational Therapy Goals        Problem: Occupational Therapy Goal    Goal Priority Disciplines Outcome Interventions   Occupational Therapy Goal     OT, PT/OT Ongoing (interventions implemented as appropriate)    Description:  Goals to be met by: 8/24/17     Patient will increase functional independence with ADLs by performing:    Feeding with [place].  UE Dressing with Supervision.  LE Dressing with Minimal Assistance with DME/AE as needed.  Grooming while seated with [place].  Toileting from toilet with Moderate Assistance for hygiene and clothing management.   Bathing from shower chair/bench with Minimal Assistance.  Shower transfer with Supervision.  Toilet transfer to toilet with Modified [place].                        Plan:  Patient to be seen 6 x/week to address the above listed problems via self-care/home management, community/work re-entry, therapeutic activities, therapeutic exercises, therapeutic groups, neuromuscular re-education, cognitive retraining, wheelchair management/training  Plan of Care expires: 08/24/17  Plan of Care reviewed with: patient, spouse, son    Chica Yadira, BRAYDEN MILLIGAN  08/18/2017

## 2017-08-18 NOTE — PLAN OF CARE
Problem: Diabetes, Type 2 (Adult)  Intervention: Optimize Glycemic Control  Recommendation/Intervention: continue diabetic diet 2000 calorie with pt preferences  Goals: pt will consume at least 75% of meals  Nutrition Goal Status: new  Communication of RD Recs:  (care plan)

## 2017-08-18 NOTE — ASSESSMENT & PLAN NOTE
Related to (etiology):   Chronic disease    Signs and Symptoms (as evidenced by)  Presence of diabetes with altered nutrition related labs      Interventions/Recommendations (treatment strategy):  Continue diabetic diet with consistent CHO     Nutrition Diagnosis Status:   New

## 2017-08-18 NOTE — PLAN OF CARE
Problem: Occupational Therapy Goal  Goal: Occupational Therapy Goal  Goals to be met by: 8/24/17     Patient will increase functional independence with ADLs by performing:    Feeding with [place].  UE Dressing with Supervision.  LE Dressing with Minimal Assistance with DME/AE as needed.  Grooming while seated with [place].  Toileting from toilet with Moderate Assistance for hygiene and clothing management.   Bathing from shower chair/bench with Minimal Assistance.  Shower transfer with Supervision.  Toilet transfer to toilet with Modified [place].       Outcome: Ongoing (interventions implemented as appropriate)  OT goals remain appropriate

## 2017-08-18 NOTE — PT/OT/SLP PROGRESS
"Speech Language Pathology Treatment          Saman Crockett   MRN: [id number]     Diet recommendations: Solid Diet Level: Regular  Liquid Diet Level: Nectar Thick   · No straws   · Small bites/sips   · Alternating bites/sips   · Meds whole buried in puree   · Eliminate distractions   · Assistance with thickening liquids  · Given Pt noncompliance w/ thickened water, recommend initiation and reinforcement of Lantigua Water Protocol  ? unthickend water allowed ONLY between meals (at least 30 minutes after PO intake)     SLP Treatment Date: 08/18/17  Speech Start Time: 0905     Speech Stop Time: 1000     Speech Total (min): 55 min       TREATMENT BILLABLE MINUTES:  Speech Therapy Individual 45 and Treatment Swallowing Dysfunction 10    General Precautions: Standard, fall, nectar thick, hearing impaired          Subjective:  "I'm telling you I'm not going to remember"     Objective:    Patient found with: peripheral IV, bed alarm  1. Pt will demonstrate use of memory strategies with 80% acc given verbal and/or visual cues with 80% acc following review.  2. Pt will recall daily activities via retelling/answering questions with 80% acc given min verbal and visual assistance   3. Pt will use external memory aids and compensatory strategies to recall routine, personal information, and recent events to improve orientation to time & recall daily events with 80% acc and min verbal and/or visual cues.   4. Pt will tolerate nectar thickened liquids and demonstrate compliance in lantigua water protocol to enhance swallow safety         Assessment:  Pt presents with severe short term memory deficits and dysphagia. Pt provided with visual cue for reference throughout structured therapy task. Pt required 10 cues to utilize visual aid across 20 minute task. Monthly calendar visible from bedside to enhance orientation to date, place, therapy schedule and staff names. Use of calendar to enhance orientation was thoroughly reviewed with Pt " instructed to refer throughout therapy session. Pt required visual cue to utilize aid across 1,2,4,and 6 minute delays. With utilization of aid, Pt able to improve orientation with 100% acc.      Pt provided with nectar thickened liquid (coffee). Pt with no acknowledgement or c/o consistency. No overt s/s of aspiration or changes in vocal quality observed throughout full 4 ounce volume. Pt offered oral care and thin water, however Pt declined.     Diet recommendations:        Liquid Diet Level: Nectar Thick  Solid: Regular     Discharge recommendations: Discharge Facility/Level Of Care Needs: home     Goals:    SLP Goals        Problem: SLP Goal    Goal Priority Disciplines Outcome   SLP Goal     SLP Ongoing (interventions implemented as appropriate)   Description:  Long term goals:  1. Pt will use appropriate memory strategies to schedule and recall weekly activities, express needs and recall names to maintain safety and participate socially in functional living environment.     Short term goals:  1. Pt will demonstrate use of memory strategies with 80% acc given verbal and/or visual cues with 80% acc following review.  2. Pt will recall a 3 part functional message related to ADL's with min verbal and visual assistance immediately after review with 80% acc.  3. Pt will recall daily activities via retelling/answering questions with 80% acc given min verbal and visual assistance   4. Pt will use external memory aids and compensatory strategies to recall routine, personal information, and recent events to improve orientation to time & recall daily events with 80% acc and min verbal and/or visual cues.   5. Pt will participate in ongoing assessment of reasoning, problem solving and safety awareness skills                      Plan:   Patient to be seen Therapy Frequency: 5 x/week   Plan of Care Expires: 08/23/17  Plan of Care reviewed with: patient  SLP Follow-up?: Yes  SLP - Next Visit Date: 08/18/17           Huan  ST [place] 8/18/2017

## 2017-08-18 NOTE — PLAN OF CARE
Problem: SLP Goal  Goal: SLP Goal  Long term goals:  1. Pt will use appropriate memory strategies to schedule and recall weekly activities, express needs and recall names to maintain safety and participate socially in functional living environment.     Short term goals:  1. Pt will demonstrate use of memory strategies with 80% acc given verbal and/or visual cues with 80% acc following review.  2. Pt will recall a 3 part functional message related to ADL's with min verbal and visual assistance immediately after review with 80% acc.  3. Pt will recall daily activities via retelling/answering questions with 80% acc given min verbal and visual assistance   4. Pt will use external memory aids and compensatory strategies to recall routine, personal information, and recent events to improve orientation to time & recall daily events with 80% acc and min verbal and/or visual cues.   5. Pt will participate in ongoing assessment of reasoning, problem solving and safety awareness skills    Outcome: Ongoing (interventions implemented as appropriate)  Pt tolerating NTL with no s/s of aspiration or changes in vocal quality. Continue lantigua water protocol

## 2017-08-18 NOTE — PROGRESS NOTES
[facility]  Physical Medicine & Rehab  Progress Note    Patient Name: Saman Crockett  MRN: [id number]  Patient Class: IP- Rehab   Admission Date: 8/9/2017  Length of Stay: 9 days  Attending Physician: Laura Gutierrez MD  Primary Care Provider: Rajan Munson MD    Subjective:     Principal Problem:  S/p R TIFFANIE    Interval History: pt is 88 y.o male  Participating with therapy. Pt with elevated bun, will consult nephro     Scheduled Medications:  aspirin  325 mg Oral BID    enalapril  2.5 mg Oral Daily    enoxaparin  40 mg Subcutaneous Daily    escitalopram oxalate  10 mg Oral Daily    ferrous gluconate  324 mg Oral Daily with breakfast    gemfibrozil  600 mg Oral BID AC    glipiZIDE  5 mg Oral Daily with breakfast    insulin detemir  18 Units Subcutaneous QHS    levothyroxine  150 mcg Oral Before breakfast    memantine  28 mg Oral Daily    metformin  750 mg Oral BID WM    multivitamin  1 tablet Oral Daily    tamsulosin  0.4 mg Oral Daily       PRN Medications: sodium chloride, acetaminophen, aluminum-magnesium hydroxide-simethicone, bisacodyl, hydrocodone-acetaminophen 5-325mg, magnesium citrate    Review of Systems   Constitutional: Negative.    HENT: Negative.    Eyes: Negative.    Respiratory: Negative.    Cardiovascular: Negative.    Gastrointestinal: Negative.    Endocrine: Negative.    Genitourinary: Negative.    Musculoskeletal: Negative.    Skin: Negative.    Allergic/Immunologic: Negative.    Neurological: Negative.    Hematological: Negative.    Psychiatric/Behavioral: Negative.      Objective:     Vital Signs (Most Recent):  Temp: 97.8 °F (36.6 °C) (08/18/17 0456)  Pulse: (!) 53 (08/18/17 0456)  Resp: 18 (08/18/17 0456)  BP: (!) 142/60 (08/18/17 0456)  SpO2: (!) 93 % (08/18/17 0456)    Vital Signs (24h Range):  Temp:  [97.8 °F (36.6 °C)-98.8 °F (37.1 °C)] 97.8 °F (36.6 °C)  Pulse:  [53-64] 53  Resp:  [16-20] 18  SpO2:  [93 %-100 %] 93 %  BP: (142-166)/(59-69) 142/60      Physical Exam   Constitutional: He is oriented to person, place, and time. He appears well-developed and well-nourished. No distress.   HENT:   Head: Normocephalic and atraumatic.   Right Ear: External ear normal.   Left Ear: External ear normal.   Nose: Nose normal.   Mouth/Throat: Oropharynx is clear and moist. No oropharyngeal exudate.   Eyes: Conjunctivae and EOM are normal. Pupils are equal, round, and reactive to light. Right eye exhibits no discharge. Left eye exhibits no discharge. No scleral icterus.   Neck: Normal range of motion. Neck supple. No JVD present. No tracheal deviation present. No thyromegaly present.   Cardiovascular: Normal rate, regular rhythm, normal heart sounds and intact distal pulses.  Exam reveals no gallop and no friction rub.    No murmur heard.  Pulmonary/Chest: Effort normal and breath sounds normal. No stridor. No respiratory distress. He has no wheezes. He has no rales. He exhibits no tenderness.   Abdominal: Soft. Bowel sounds are normal. He exhibits no distension and no mass. There is no tenderness. There is no rebound and no guarding. No hernia.   Genitourinary:   Genitourinary Comments: deferred   Musculoskeletal: Normal range of motion. He exhibits no edema, tenderness or deformity.   Lymphadenopathy:     He has no cervical adenopathy.   Neurological: He is alert and oriented to person, place, and time. He has normal reflexes. He displays normal reflexes. No cranial nerve deficit. He exhibits normal muscle tone. Coordination normal.   Skin: No rash noted. He is not diaphoretic. No erythema. No pallor.   Psychiatric: He has a normal mood and affect. His behavior is normal. Judgment and thought content normal.     NEUROLOGICAL EXAMINATION:     MENTAL STATUS   Oriented to person, place, and time.     CRANIAL NERVES     CN III, IV, VI   Pupils are equal, round, and reactive to light.  Extraocular motions are normal.       Assessment/Plan:      Saman Crockett is a 88 y.o.  male admitted to inpatient rehabilitation on 8/9/2017 for <principal problem not specified> with impaired mobility and ADLs. Patient remains appropriate for PT, OT, and as required Speech therapy. Patient continues to require 24 hour nursing care as well as daily Physician assessment.    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] 08/09/2017 Not Applicable      Problems Resolved During this Admission:    Diagnosis Date Noted Date Resolved POA     S/p R TIFFANIE, cont current meds  DVT prophylaxis, cont  Sq Lovenox   HTN, vitals noted, cont current meds  DM, accu check noted, cont current meds  Elevated bun, consult nephro     DISCHARGE PLANNING:  Tentative Discharge Date:     Future Appointments  Date Time Provider Department Center   8/30/2017 1:00 PM Rajan Munson MD [facility] [address]       Laura Gutierrez MD  Department of Physical Medicine & Rehab  [facility]

## 2017-08-18 NOTE — PLAN OF CARE
Problem: Fall Risk (Adult)  Intervention: Safety Promotion/Fall Prevention  Frequent review of fall precautions and when and how to use call light,  Pt inconsistent with calling for asssitance.Needs reinforcement. No injury or falls

## 2017-08-18 NOTE — PLAN OF CARE
Problem: Patient Care Overview  Goal: Plan of Care Review  Outcome: Ongoing (interventions implemented as appropriate)  AOX1, restless, confused, forgetful and impulsive.  Attempts to get OOB or w/c unassisted at times after repeated reinforcement of call light use demonstration. Cont of B&B. Minimal pain to right groin.  Staples intact to right hip incisions THIERNO, steri strips intact to right hip also THIERNO

## 2017-08-18 NOTE — PROGRESS NOTES
[facility]  Adult Nutrition  Progress Note    SUMMARY     Recommendations    Recommendation/Intervention: continue diabetic diet 2000 calories with pt preferences  Goals: pt will consume at least 75% of meals  Nutrition Goal Status: new  Communication of RD Recs:  (care plan)    Discharge Plan: diabetic diet     Reason for Assessment    Reason for Assessment: length of stay  1. Renal insufficiency    2. S/P total hip arthroplasty, right      Past Medical History:   Diagnosis Date    Diabetes mellitus     Elevated PSA     Hyperlipidemia     Kidney stone     Thyroid disease        Interdisciplinary Rounds: did not attend     General Information Comments: Admitted r/t impaired mobility. Pt reports good appetite, however meal intake records show varied appt which is declining since admit.  Encouraged pt preferences and education family to ordering system.  Wt appears stable per chart review    Nutrition Prescription Ordered    Current Diet Order: diabetic 2000     Evaluation of Received Nutrients/Fluid Intake     Energy Calories Required: not meeting needs  Protein Required: not meeting needs   Fluid Required: meeting needs   Comments: Meal intake averages 42% over last 10 documented meals.    % Intake of Estimated Energy Needs: 25 - 50 %  % Meal Intake: Other: 42% average over last 10 documented meals     Nutrition Risk Screen     Nutrition Risk Screen: no indicators present    Nutrition/Diet History    Patient Reported Diet/Restrictions/Preferences: general  Food Preferences: No cultural or religous food preferences identified.      Labs/Tests/Procedures/Meds    Diagnostic Test/Procedure Review: reviewed, pertinent  Pertinent Labs Reviewed: reviewed, pertinent      BMP  Lab Results   Component Value Date     08/18/2017    K 4.5 08/18/2017     08/18/2017    CO2 21 (L) 08/18/2017    BUN 34 (H) 08/18/2017    CREATININE 1.4 08/18/2017    CALCIUM 9.3 08/18/2017    ANIONGAP 10 08/18/2017     ESTGFRAFRICA 51 (A) 2017    EGFRNONAA 45 (A) 2017       Recent Labs  Lab 17  1154   POCTGLUCOSE 121*     No results found for: LABA1C, HGBA1C  No results found for: CHOL  No results found for: HDL  No results found for: LDLCALC  No results found for: TRIG  No results found for: CHOLHDL    Pertinent Medications Reviewed: reviewed, pertinent      Scheduled Meds:   aspirin  325 mg Oral BID    enalapril  2.5 mg Oral Daily    enoxaparin  40 mg Subcutaneous Daily    escitalopram oxalate  10 mg Oral Daily    ferrous gluconate  324 mg Oral Daily with breakfast    gemfibrozil  600 mg Oral BID AC    glipiZIDE  5 mg Oral Daily with breakfast    insulin detemir  18 Units Subcutaneous QHS    levothyroxine  150 mcg Oral Before breakfast    memantine  28 mg Oral Daily    metformin  750 mg Oral BID WM    multivitamin  1 tablet Oral Daily    tamsulosin  0.4 mg Oral Daily     Continuous Infusions:   sodium chloride 0.9% 75 mL/hr at 17 0700         Physical Findings     Oral/Mouth Cavity: tooth/teeth missing  Skin:  (Kirit score 19)    Anthropometrics    Temp: 97.8 °F (36.6 °C)  Height: 6'  Weight: 83.6 kg (184 lb 4.9 oz)  Ideal Body Weight (IBW), Male: 178 lb  % Ideal Body Weight, Male (lb): 103.54 lb  BMI (Calculated): 25   Usual Body Weight (UBW), k.8 kg (per chart review 17)  % Usual Body Weight: 101.18  % Weight Change From Usual Weight: -1.18 %     Estimated/Assessed Needs    Weight Used For Calorie Calculations: 83.6 kg (184 lb 4.9 oz)   Height (cm): 182.9 cm  Energy Need Method: Price-St Jeor  RMR (Price-St. Jeor Equation): 1544 x 1.25 PAL= 1930   Weight Used For Protein Calculations: 83.6 kg (184 lb 4.9 oz) (1.0-1.2)  1.0 gm Protein (gm): 83.77 and 1.2 gm Protein (gm): 100.53  Fluid Need Method: RDA Method (or per MD rec)   CHO Requirement: 225 gms CHO (45% of 2000 calories)    Assessment and Plan    Type 2 diabetes mellitus with diabetic polyneuropathy, without  long-term current use of insulin    Related to (etiology):   Chronic disease    Signs and Symptoms (as evidenced by)  Presence of diabetes with altered nutrition related labs      Interventions/Recommendations (treatment strategy):  Continue diabetic diet with consistent CHO     Nutrition Diagnosis Status:   New              Monitor and Evaluation    Food and Nutrient Intake: energy intake  Food and Nutrient Adminstration: diet order   Anthropometric Measurements: weight, weight change  Biochemical Data, Medical Tests and Procedures: electrolyte and renal panel, glucose/endocrine profile, lipid profile  Nutrition-Focused Physical Findings: overall appearance, skin    Nutrition Risk    Level of Risk:  (2 x weekly)    Nutrition Follow-Up      yes

## 2017-08-18 NOTE — PLAN OF CARE
Problem: Patient Care Overview  Goal: Plan of Care Review  Outcome: Ongoing (interventions implemented as appropriate)  Pt alert with forgetfulness and agitation. Min assist with transfers. POC reviewed. Pt impulsive . Bed and chair alarm on at all times.

## 2017-08-18 NOTE — PT/OT/SLP PROGRESS
Physical Therapy         Treatment        Saman Crockett   MRN: [id number]     PT Received On: 17  Total Time (min): 45      Billable Minutes:  Gait [id number] and Therapeutic Exercise 35  Total Minutes: 45    Treatment Type: Treatment  PT/PTA: PT     PTA Visit Number: 0       General Precautions: Standard, fall  Orthopedic Precautions: Orthopedic Precautions : RLE weight bearing as tolerated, RLE anterior precautions        Subjective:  Pain/Comfort  Pain Rating 1: 410  Location - Side 1: Right  Location 1: groin  Pain Addressed 1: Nurse notified, Cessation of Activity, Distraction, Reposition  Pain Rating Post-Intervention 1: 4/10    Objective:  Patient found seated in w/c, in NAD.       Functional Mobility:    Transfer Training:  Sit to stand:Contact Guard Assistance with Rolling Walker   x 3    Gait Trainin' x 2 with RW with CGA and cues      Additional Treatment:  SEATED: LAQ and hip flexion (L with 3#), hip abduction with yellow tband, hip adduction with ball, ankle DF/PF, knee flexion with red tband, x 30 reps each    SciFit StepOne: L 1.0 x 14 min, LEs only    Activity Tolerance:  Patient limited by fatigue and Patient limited by pain    Patient left up in chair with call button in reach and chair alarm on.    Assessment:  Saman Crockett is a 88 y.o. male     Rehab potential is good.    Activity tolerance: Fair      GOALS:    Physical Therapy Goals        Problem: Physical Therapy Goal    Goal Priority Disciplines Outcome Goal Variances Interventions   Physical Therapy Goal     PT/OT, PT Ongoing (interventions implemented as appropriate)     Description:  Goals to be met by: 2017     Patient will increase functional independence with mobility by performin. Supine to sit with Stand-by Assistance  2. Sit to supine with Stand-by Assistance  3. Sit to stand transfer with Stand-by Assistance  4. Bed to chair transfer with Stand-by Assistance using Rolling Walker  5. Gait  x 200 feet with  Stand-by Assistance using Rolling Walker.   6. Wheelchair propulsion x 200 feet with Modified [place] using bilateral uppper extremities  7. Ascend/descend at least 4 stair with left Handrail Contact Guard Assistance.                      PLAN:    Patient to be seen daily  to address the above listed problems via gait training, therapeutic activities, therapeutic exercises, neuromuscular re-education, wheelchair management/training  Plan of Care expires: 08/25/17  Plan of Care reviewed with: patient         Jeffrey T Katherine, PT 8/18/2017

## 2017-08-19 LAB
POCT GLUCOSE: 169 MG/DL (ref 70–110)
POCT GLUCOSE: 170 MG/DL (ref 70–110)
POCT GLUCOSE: 170 MG/DL (ref 70–110)
POCT GLUCOSE: 184 MG/DL (ref 70–110)

## 2017-08-19 PROCEDURE — 25000003 PHARM REV CODE 250: Performed by: PHYSICAL MEDICINE & REHABILITATION

## 2017-08-19 PROCEDURE — 12800000 HC REHAB SEMI-PRIVATE ROOM

## 2017-08-19 PROCEDURE — 97110 THERAPEUTIC EXERCISES: CPT | Performed by: PHYSICAL THERAPIST

## 2017-08-19 PROCEDURE — 27000221 HC OXYGEN, UP TO 24 HOURS

## 2017-08-19 PROCEDURE — 63600175 PHARM REV CODE 636 W HCPCS: Performed by: PHYSICAL MEDICINE & REHABILITATION

## 2017-08-19 PROCEDURE — 94761 N-INVAS EAR/PLS OXIMETRY MLT: CPT

## 2017-08-19 PROCEDURE — 97530 THERAPEUTIC ACTIVITIES: CPT | Performed by: PHYSICAL THERAPIST

## 2017-08-19 RX ADMIN — THERA TABS 1 TABLET: TAB at 09:08

## 2017-08-19 RX ADMIN — TAMSULOSIN HYDROCHLORIDE 0.4 MG: 0.4 CAPSULE ORAL at 09:08

## 2017-08-19 RX ADMIN — ASPIRIN 325 MG ORAL TABLET 325 MG: 325 PILL ORAL at 09:08

## 2017-08-19 RX ADMIN — GEMFIBROZIL 600 MG: 600 TABLET ORAL at 06:08

## 2017-08-19 RX ADMIN — HYDROCODONE BITARTRATE AND ACETAMINOPHEN 2 TABLET: 5; 325 TABLET ORAL at 09:08

## 2017-08-19 RX ADMIN — GEMFIBROZIL 600 MG: 600 TABLET ORAL at 04:08

## 2017-08-19 RX ADMIN — METFORMIN HYDROCHLORIDE 750 MG: 750 TABLET, EXTENDED RELEASE ORAL at 04:08

## 2017-08-19 RX ADMIN — LEVOTHYROXINE SODIUM 150 MCG: 150 TABLET ORAL at 06:08

## 2017-08-19 RX ADMIN — MEMANTINE HYDROCHLORIDE 28 MG: 28 CAPSULE, EXTENDED RELEASE ORAL at 09:08

## 2017-08-19 RX ADMIN — ENALAPRIL MALEATE 2.5 MG: 2.5 TABLET ORAL at 09:08

## 2017-08-19 RX ADMIN — ENOXAPARIN SODIUM 40 MG: 100 INJECTION SUBCUTANEOUS at 04:08

## 2017-08-19 RX ADMIN — ESCITALOPRAM 10 MG: 10 TABLET, FILM COATED ORAL at 09:08

## 2017-08-19 RX ADMIN — GLIPIZIDE 5 MG: 5 TABLET, FILM COATED, EXTENDED RELEASE ORAL at 07:08

## 2017-08-19 RX ADMIN — FERROUS GLUCONATE 324 MG: 324 TABLET ORAL at 07:08

## 2017-08-19 RX ADMIN — METFORMIN HYDROCHLORIDE 750 MG: 750 TABLET, EXTENDED RELEASE ORAL at 07:08

## 2017-08-19 NOTE — PROGRESS NOTES
[facility]  Physical Medicine & Rehab  Progress Note    Patient Name: Saman Crockett  MRN: [id number]  Patient Class: IP- Rehab   Admission Date: 8/9/2017  Length of Stay: 10 days  Attending Physician: Laura Gutierrez MD  Primary Care Provider: Rajan Munson MD    Subjective:     Principal Problem:  S/p R TIFFANIE     Interval History: pt is 88 y.o male  S/p R TIFFANIE. Participating with therapy & making progress. nephro eval noted     Scheduled Medications:  aspirin  325 mg Oral BID    enalapril  2.5 mg Oral Daily    enoxaparin  40 mg Subcutaneous Daily    escitalopram oxalate  10 mg Oral Daily    ferrous gluconate  324 mg Oral Daily with breakfast    gemfibrozil  600 mg Oral BID AC    glipiZIDE  5 mg Oral Daily with breakfast    insulin detemir  18 Units Subcutaneous QHS    levothyroxine  150 mcg Oral Before breakfast    memantine  28 mg Oral Daily    metformin  750 mg Oral BID WM    multivitamin  1 tablet Oral Daily    tamsulosin  0.4 mg Oral Daily       PRN Medications: acetaminophen, aluminum-magnesium hydroxide-simethicone, bisacodyl, hydrocodone-acetaminophen 5-325mg, magnesium citrate    Review of Systems   Constitutional: Negative.    HENT: Negative.    Eyes: Negative.    Respiratory: Negative.    Cardiovascular: Negative.    Gastrointestinal: Negative.    Endocrine: Negative.    Genitourinary: Negative.    Musculoskeletal: Negative.    Skin: Negative.    Allergic/Immunologic: Negative.    Neurological: Negative.    Hematological: Negative.    Psychiatric/Behavioral: Negative.      Objective:     Vital Signs (Most Recent):  Temp: 98.4 °F (36.9 °C) (08/19/17 0600)  Pulse: 66 (08/19/17 0701)  Resp: 18 (08/19/17 0701)  BP: (!) 146/54 (08/19/17 0701)  SpO2: (!) 93 % (08/19/17 0815)    Vital Signs (24h Range):  Temp:  [98.4 °F (36.9 °C)-98.6 °F (37 °C)] 98.4 °F (36.9 °C)  Pulse:  [58-66] 66  Resp:  [18-20] 18  SpO2:  [93 %-98 %] 93 %  BP: (115-146)/(54-84) 146/54     Physical Exam    Constitutional: He is oriented to person, place, and time. He appears well-developed and well-nourished. No distress.   HENT:   Head: Normocephalic and atraumatic.   Right Ear: External ear normal.   Left Ear: External ear normal.   Nose: Nose normal.   Mouth/Throat: Oropharynx is clear and moist. No oropharyngeal exudate.   Eyes: Conjunctivae and EOM are normal. Pupils are equal, round, and reactive to light. Left eye exhibits no discharge. No scleral icterus.   Neck: Normal range of motion. Neck supple. No JVD present. No tracheal deviation present. No thyromegaly present.   Cardiovascular: Normal rate, regular rhythm, normal heart sounds and intact distal pulses.  Exam reveals no gallop and no friction rub.    No murmur heard.  Pulmonary/Chest: Effort normal and breath sounds normal. No stridor. No respiratory distress. He has no wheezes. He has no rales. He exhibits no tenderness.   Abdominal: Soft. Bowel sounds are normal. He exhibits no distension and no mass. There is no tenderness. There is no rebound and no guarding. No hernia.   Genitourinary:   Genitourinary Comments: deferred   Musculoskeletal: Normal range of motion. He exhibits no edema, tenderness or deformity.   Lymphadenopathy:     He has no cervical adenopathy.   Neurological: He is alert and oriented to person, place, and time. He has normal reflexes. He displays normal reflexes. No cranial nerve deficit. He exhibits normal muscle tone. Coordination normal.   Skin: No rash noted. He is not diaphoretic. No erythema. No pallor.   Psychiatric: He has a normal mood and affect. His behavior is normal. Judgment and thought content normal.     NEUROLOGICAL EXAMINATION:     MENTAL STATUS   Oriented to person, place, and time.     CRANIAL NERVES     CN III, IV, VI   Pupils are equal, round, and reactive to light.  Extraocular motions are normal.       Assessment/Plan:    s/p R TIFFANIE , cont PT, OT  Pain , managed with current meds  DVT prophylaxis, cont sq  Lovenox   DM, accu checks noted, cont current meds    Saman Crockett is a 88 y.o. male admitted to inpatient rehabilitation on 8/9/2017 for <principal problem not specified> with impaired mobility and ADLs. Patient remains appropriate for PT, OT, and as required Speech therapy. Patient continues to require 24 hour nursing care as well as daily Physician assessment.    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] 08/09/2017 Not Applicable    Type 2 diabetes mellitus with diabetic polyneuropathy, without long-term current use of insulin [E11.42] 05/31/2017 Yes      Problems Resolved During this Admission:    Diagnosis Date Noted Date Resolved POA       DISCHARGE PLANNING:  Tentative Discharge Date:     Future Appointments  Date Time Provider Department Center   8/30/2017 1:00 PM Rajan Munson MD [facility] [address]       Laura Gutierrez MD  Department of Physical Medicine & Rehab  [facility]

## 2017-08-19 NOTE — PROGRESS NOTES
Report received at this time from previous nurse YARELI Scott, care of patient will be taken over by this nurse as of this time, until in the early morning hours.  Will monitor and observe patient.

## 2017-08-19 NOTE — PROGRESS NOTES
Progress Note  Nephrology    Admit Date: 2017   LOS: 10 days     SUBJECTIVE:     CC:  CKD III    Past medical, surgical and social history reviewed    History of Present Illness:  Pt with h/o Significant for chronic kidney disease stage III, anemia,   hypertension, insulin-dependent type 2 diabetes, hypothyroidism, and coronary   artery disease is s/p hip orif.  Had had alanna post op but is now at baseline.  12 systems reviewed and his only complaints were he's tired and his leg aches when he walks    --working with PT at present, doing well.  Feels better.  No c/o any kind.    Plan of Care:         Assessment/Plan:     1. Acute Kidney Injury-at baseline.  No nsaids,  Keep hydrated     2. Volume/Blood Pressure-keep map greater than 55                     3. Electrolytes/Acid Base-monitor for hyperkalemia     4. Bone Mineral Metabolism-no active issues     5. Anemia of CKD-outpt sherly     6. Medications- Avoid NSAIDS, COXIBS, iv radiocontrast, and aminoglycoside antibiotics when feasible.  Renal dose medication for crcl 10-50     Review of Systems:  Constitutional: no fever or chills  Respiratory: no cough or shortness of breath  Cardiovascular: no chest pain or palpitations  Gastrointestinal: no nausea or vomiting, no abdominal pain or change in bowel habits  Musculoskeletal: no arthralgias or myalgias  Neurological: no seizures or tremors    OBJECTIVE:     Vital Signs (Most Recent)  Temp: 98.4 °F (36.9 °C) (17 0600)  Pulse: 66 (17 0701)  Resp: 18 (17 0701)  BP: (!) 146/54 (17 0701)  SpO2: (!) 93 % (17 0815)    Vital Signs Range (Last 24H):  Temp:  [98.4 °F (36.9 °C)-98.6 °F (37 °C)]   Pulse:  [58-66]   Resp:  [18-20]   BP: (115-146)/(54-84)   SpO2:  [93 %-98 %]     Temp (24hrs), Av.5 °F (36.9 °C), Min:98.4 °F (36.9 °C), Max:98.6 °F (37 °C)    Systolic (24hrs), Av , Min:115 , Max:146     Diastolic (24hrs), Av, Min:54, Max:84      I & O (Last 24H):  Intake/Output Summary  (Last 24 hours) at 08/19/17 1111  Last data filed at 08/19/17 0900   Gross per 24 hour   Intake             1200 ml   Output                0 ml   Net             1200 ml     Physical Exam:  General appearance: well developed, well nourished  Eyes:  conjunctivae/corneas clear. PERRL.  Neck: supple, symmetrical, trachea midline, no JVD  Lungs:  clear to auscultation bilaterally and normal respiratory effort  Heart: regular rate and rhythm, S1, S2 normal, no murmur, click, rub or gallop  Abdomen: soft, non-tender non-distented; bowel sounds normal; no masses,  no organomegaly  Extremities: no cyanosis or edema, or clubbing  Skin: Skin color, texture, turgor normal. No rashes or lesions  Neurologic: Normal strength and tone. No focal numbness or weakness    Laboratory:  CBC:  No results for input(s): WBC, RBC, HGB, HCT, PLT, MCV, MCH, MCHC in the last 24 hours.  BMP:  No results for input(s): GLUCOSE, NA, K, CL, CO2, BUN, CREATININE, CALCIUM in the last 24 hours.   CMP: No results for input(s): GLU, CALCIUM, ALBUMIN, PROT, NA, K, CO2, CL, BUN, CREATININE, ALKPHOS, ALT, AST, BILITOT in the last 24 hours.  All other lab data reviewed and negative unless otherwise specified   Diagnostic Results:  Labs: Reviewed    ASSESSMENT/PLAN:     Active Hospital Problems    Diagnosis  POA    S/P total hip arthroplasty [Z96.649]  Not Applicable    Type 2 diabetes mellitus with diabetic polyneuropathy, without long-term current use of insulin [E11.42]  Yes      Resolved Hospital Problems    Diagnosis Date Resolved POA   No resolved problems to display.

## 2017-08-19 NOTE — PLAN OF CARE
Problem: Patient Care Overview  Goal: Plan of Care Review  Outcome: Ongoing (interventions implemented as appropriate)  Patient improving daily with his current plan of care, will continue to monitor and observe.

## 2017-08-19 NOTE — PLAN OF CARE
Problem: Patient Care Overview  Goal: Plan of Care Review  Outcome: Ongoing (interventions implemented as appropriate)  AA tolerating therapy well no acute distress noted

## 2017-08-19 NOTE — PLAN OF CARE
08/18/17 2000   Patient Assessment/Suction   Level of Consciousness (AVPU) alert   Respiratory Effort Normal;Unlabored   All Lung Fields Breath Sounds clear;equal bilaterally   PRE-TX-O2-ETCO2   O2 Device (Oxygen Therapy) nasal cannula   $ Is the patient on Oxygen? Yes   Flow (L/min) 1   SpO2 (!) 94 %  (room air sats 89%)   Pulse Oximetry Type Intermittent   $ Pulse Oximetry - Multiple Charge Pulse Oximetry - Multiple   Pulse 63   Resp 20   Pt assessed, no distress noted.

## 2017-08-19 NOTE — PT/OT/SLP PROGRESS
Physical Therapy         Treatment        Saman Crockett   MRN: [id number]     PT Received On: 08/19/17  Total Time (min): 30        Billable Minutes:  Therapeutic Activity 15 min and Therapeutic Exercise 15 min  Total Minutes: 30    Treatment Type: Treatment  PT/PTA: PT     PTA Visit Number: 0       General Precautions: Standard, fall, nectar thick, hearing impaired  Orthopedic Precautions: Orthopedic Precautions : RLE weight bearing as tolerated, RLE anterior precautions   Braces:           Subjective:  Communicated with primary nurse prior to session.    Pain/Comfort  Pain Rating 1: 0/10  Pain Addressed 1: Distraction  Pain Rating Post-Intervention 1: 3/10    Objective:  Patient found lying in bed, with Patient found with: bed alarm    Functional Mobility:  Bed Mobility:   Supine to sit: Standby Assistance   Sit to supine: Activity did not occur   Rolling: Activity did not occur   Scooting: Standby Assistance    Balance:   Static Sit: FAIR: Maintains without assist, but unable to take any challenges   Dynamic Sit:  FAIR+: Maintains balance through MINIMAL excursions of active trunk motion  Static Stand: FAIR: Maintains without assist but unable to take challenges  Dynamic stand: FAIR: Needs CONTACT GUARD during gait    Transfer Training:  Sit to stand:Contact Guard Assistance with Rolling Walker .    Wheelchair Training:  Pt propelled Standard wheelchair x 200  feet on Level tile with  Bilateral upper extremity with Supervision or Set-up Assistance.     Gait Training:  Patient gait trained FWB/WBAT: right lower extremity 80  feet on level tile with Rolling Walker with Contact Guard Assistance.  Pt with demonstarting a  modified two point gait with decreased cristiana, decreased step length and decreased toe-to-floor clearance.Impairments contributing to gait deviations include impaired balance, impaired motor control, pain and decreased strength        Additional Treatment:  Scifit x 10 min, L 1.5    Activity  Tolerance:  Patient limited by pain    Patient left up in chair with family present.    Assessment:  Saman Crockett is a 88 y.o. male with a medical diagnosis of <principal problem not specified>. He presents with continued c/o pain with activity.    Rehab potential is fair.    Activity tolerance: Fair    Discharge recommendations: Discharge Facility/Level Of Care Needs: home     Equipment recommendations:       GOALS:    Physical Therapy Goals        Problem: Physical Therapy Goal    Goal Priority Disciplines Outcome Goal Variances Interventions   Physical Therapy Goal     PT/OT, PT Ongoing (interventions implemented as appropriate)     Description:  Goals to be met by: 2017     Patient will increase functional independence with mobility by performin. Supine to sit with Stand-by Assistance  2. Sit to supine with Stand-by Assistance  3. Sit to stand transfer with Stand-by Assistance  4. Bed to chair transfer with Stand-by Assistance using Rolling Walker  5. Gait  x 200 feet with Stand-by Assistance using Rolling Walker.   6. Wheelchair propulsion x 200 feet with Modified [place] using bilateral uppper extremities  7. Ascend/descend at least 4 stair with left Handrail Contact Guard Assistance.                      PLAN:    Patient to be seen daily  to address the above listed problems via gait training, therapeutic activities, therapeutic exercises, neuromuscular re-education, wheelchair management/training  Plan of Care expires: 17  Plan of Care reviewed with: patient         Amy Wills, PT 2017

## 2017-08-19 NOTE — PLAN OF CARE
08/19/17 0815   Patient Assessment/Suction   Level of Consciousness (AVPU) alert   Respiratory Effort Unlabored   PRE-TX-O2-ETCO2   O2 Device (Oxygen Therapy) room air   SpO2 (!) 93 %   Pulse Oximetry Type Intermittent   $ Pulse Oximetry - Multiple Charge Pulse Oximetry - Multiple

## 2017-08-20 LAB
POCT GLUCOSE: 132 MG/DL (ref 70–110)
POCT GLUCOSE: 146 MG/DL (ref 70–110)

## 2017-08-20 PROCEDURE — 27000221 HC OXYGEN, UP TO 24 HOURS

## 2017-08-20 PROCEDURE — 97110 THERAPEUTIC EXERCISES: CPT | Performed by: PHYSICAL THERAPIST

## 2017-08-20 PROCEDURE — 97530 THERAPEUTIC ACTIVITIES: CPT | Performed by: PHYSICAL THERAPIST

## 2017-08-20 PROCEDURE — 12800000 HC REHAB SEMI-PRIVATE ROOM

## 2017-08-20 PROCEDURE — 94761 N-INVAS EAR/PLS OXIMETRY MLT: CPT

## 2017-08-20 PROCEDURE — 63600175 PHARM REV CODE 636 W HCPCS: Performed by: PHYSICAL MEDICINE & REHABILITATION

## 2017-08-20 PROCEDURE — 25000003 PHARM REV CODE 250: Performed by: PHYSICAL MEDICINE & REHABILITATION

## 2017-08-20 RX ADMIN — GEMFIBROZIL 600 MG: 600 TABLET ORAL at 06:08

## 2017-08-20 RX ADMIN — ENALAPRIL MALEATE 2.5 MG: 2.5 TABLET ORAL at 09:08

## 2017-08-20 RX ADMIN — FERROUS GLUCONATE 324 MG: 324 TABLET ORAL at 07:08

## 2017-08-20 RX ADMIN — ASPIRIN 325 MG ORAL TABLET 325 MG: 325 PILL ORAL at 09:08

## 2017-08-20 RX ADMIN — ASPIRIN 325 MG ORAL TABLET 325 MG: 325 PILL ORAL at 08:08

## 2017-08-20 RX ADMIN — ESCITALOPRAM 10 MG: 10 TABLET, FILM COATED ORAL at 09:08

## 2017-08-20 RX ADMIN — METFORMIN HYDROCHLORIDE 750 MG: 750 TABLET, EXTENDED RELEASE ORAL at 07:08

## 2017-08-20 RX ADMIN — GLIPIZIDE 5 MG: 5 TABLET, FILM COATED, EXTENDED RELEASE ORAL at 07:08

## 2017-08-20 RX ADMIN — METFORMIN HYDROCHLORIDE 750 MG: 750 TABLET, EXTENDED RELEASE ORAL at 04:08

## 2017-08-20 RX ADMIN — ENOXAPARIN SODIUM 40 MG: 100 INJECTION SUBCUTANEOUS at 04:08

## 2017-08-20 RX ADMIN — LEVOTHYROXINE SODIUM 150 MCG: 150 TABLET ORAL at 05:08

## 2017-08-20 RX ADMIN — MEMANTINE HYDROCHLORIDE 28 MG: 28 CAPSULE, EXTENDED RELEASE ORAL at 09:08

## 2017-08-20 RX ADMIN — GEMFIBROZIL 600 MG: 600 TABLET ORAL at 04:08

## 2017-08-20 RX ADMIN — HYDROCODONE BITARTRATE AND ACETAMINOPHEN 2 TABLET: 5; 325 TABLET ORAL at 08:08

## 2017-08-20 RX ADMIN — TAMSULOSIN HYDROCHLORIDE 0.4 MG: 0.4 CAPSULE ORAL at 09:08

## 2017-08-20 RX ADMIN — THERA TABS 1 TABLET: TAB at 09:08

## 2017-08-20 NOTE — PLAN OF CARE
Problem: Patient Care Overview  Goal: Plan of Care Review  Outcome: Ongoing (interventions implemented as appropriate)  Patient is able to ambulate to the bathroom during H.S. Hours using the walker and SBA, with a staff member.  Encouraged patient to continue active ROM, but also reminded patient to call with use of the call light at all times before attempting to get out of bed.   Patient stated that he understood instructions given. Patient is able to turn and reposition as needed during HS hours Bed alarm on at all times, side rails up for safety, call light is within easy reach , instructed patient to call at any time for assistance.Patient will be kept pain free, during shift.Resp are even and unlabored, lungs sound clear, no s & s of distress noted at this time.  Will continue to monitor and observe during HS hours.Patient up and dressed in early am,  sitting at sink in room brushing teeth, attempting to shave.  Seat belt is applied.

## 2017-08-20 NOTE — PLAN OF CARE
08/20/17 0840   PRE-TX-O2-ETCO2   O2 Device (Oxygen Therapy) nasal cannula   $ Is the patient on Oxygen? Yes   Flow (L/min) 2   Oxygen Concentration (%) 28   SpO2 99 %   Pulse Oximetry Type Intermittent   $ Pulse Oximetry - Multiple Charge Pulse Oximetry - Multiple   Ready to Wean/Extubation Screen   FIO2<=50 (chart decimal) 0.28

## 2017-08-20 NOTE — PLAN OF CARE
Problem: Patient Care Overview  Goal: Plan of Care Review  Outcome: Ongoing (interventions implemented as appropriate)  AAOx3 vss tolerating therapy well no acute distress noted

## 2017-08-20 NOTE — PT/OT/SLP PROGRESS
Physical Therapy         Treatment        Saman Crockett   MRN: [id number]     PT Received On: 08/20/17  Total Time (min): 30        Billable Minutes:  Therapeutic Activity 15 min and Therapeutic Exercise 15 min  Total Minutes: 30    Treatment Type: Treatment  PT/PTA: PT     PTA Visit Number: 0       General Precautions: Standard, fall, hearing impaired  Orthopedic Precautions: Orthopedic Precautions : RLE weight bearing as tolerated, RLE anterior precautions   Braces:           Subjective:  Communicated with primary nurse prior to session.    Pain/Comfort  Pain Rating 1: 0/10  Pain Addressed 1: Distraction  Pain Rating Post-Intervention 1: 3/10    Objective:  Patient found lying in bed. Initially refused PT, but after discussing plan for today he was agreeable to come to gym and participate.  Patient found with: bed alarm    Functional Mobility:  Bed Mobility:   Supine to sit: Standby Assistance   Sit to supine: Activity did not occur   Rolling: Activity did not occur   Scooting: Standby Assistance    Balance:   Static Sit: FAIR: Maintains without assist, but unable to take any challenges   Dynamic Sit:  FAIR+: Maintains balance through MINIMAL excursions of active trunk motion  Static Stand: FAIR: Maintains without assist but unable to take challenges  Dynamic stand: FAIR: Needs CONTACT GUARD during gait    Transfer Training:  Bed <> Chair:  Stand Pivot with Contact Guard Assistance with No Assistive Device cuing for body mechanics and hand placement    Wheelchair Training:  Pt propelled Standard wheelchair x 200 feet on Level tile with  Bilateral upper extremity with Stand-by Assistance. (pt refused to use LEs for WC mobility)    Gait Training:  Patient gait trained FWB/WBAT: right lower extremity 150 feet    on level tile with Rolling Walker with Contact Guard Assistance.  Pt with demonstarting a  modified two point gait with decreased cristiana and decreased toe-to-floor clearance.Impairments contributing to gait  deviations include impaired balance, pain and decreased strength        Additional Treatment:  Scifit x 15 min at L1 to improve endurance and ROM.    Activity Tolerance:  Patient tolerated treatment well    Patient left up in chair with nurses present, as pt desired to read newspaper in common area.    Assessment:  Saman Crockett is a 88 y.o. male with a medical diagnosis of <principal problem not specified>. He presents with continued c/o pain in the R groin, which is limiting his tolerance for activity.    Rehab potential is good.    Activity tolerance: Good    Discharge recommendations: Discharge Facility/Level Of Care Needs: home     Equipment recommendations:       GOALS:    Physical Therapy Goals        Problem: Physical Therapy Goal    Goal Priority Disciplines Outcome Goal Variances Interventions   Physical Therapy Goal     PT/OT, PT Ongoing (interventions implemented as appropriate)     Description:  Goals to be met by: 2017     Patient will increase functional independence with mobility by performin. Supine to sit with Stand-by Assistance  2. Sit to supine with Stand-by Assistance  3. Sit to stand transfer with Stand-by Assistance  4. Bed to chair transfer with Stand-by Assistance using Rolling Walker  5. Gait  x 200 feet with Stand-by Assistance using Rolling Walker.   6. Wheelchair propulsion x 200 feet with Modified [place] using bilateral uppper extremities  7. Ascend/descend at least 4 stair with left Handrail Contact Guard Assistance.                      PLAN:    Patient to be seen daily  to address the above listed problems via gait training, therapeutic activities, therapeutic exercises, neuromuscular re-education, wheelchair management/training  Plan of Care expires: 17  Plan of Care reviewed with: patient         Amy Wills, PT 2017

## 2017-08-20 NOTE — PROGRESS NOTES
[facility]  Physical Medicine & Rehab  Progress Note    Patient Name: Saman Crockett  MRN: [id number]  Patient Class: IP- Rehab   Admission Date: 8/9/2017  Length of Stay: 11 days  Attending Physician: Laura Gutierrez MD  Primary Care Provider: Rajan Munson MD    Subjective:     Principal Problem:  S/p R TIFFANIE  Interval History: pt is 88 y.o male s/p R TIFFANIE,  Participate with therapy     Scheduled Medications:  aspirin  325 mg Oral BID    enalapril  2.5 mg Oral Daily    enoxaparin  40 mg Subcutaneous Daily    escitalopram oxalate  10 mg Oral Daily    ferrous gluconate  324 mg Oral Daily with breakfast    gemfibrozil  600 mg Oral BID AC    glipiZIDE  5 mg Oral Daily with breakfast    insulin detemir  18 Units Subcutaneous QHS    levothyroxine  150 mcg Oral Before breakfast    memantine  28 mg Oral Daily    metformin  750 mg Oral BID WM    multivitamin  1 tablet Oral Daily    tamsulosin  0.4 mg Oral Daily       PRN Medications: acetaminophen, aluminum-magnesium hydroxide-simethicone, bisacodyl, hydrocodone-acetaminophen 5-325mg, magnesium citrate    Review of Systems   Constitutional: Negative.    HENT: Negative.    Eyes: Negative.    Respiratory: Negative.    Cardiovascular: Negative.    Gastrointestinal: Negative.    Endocrine: Negative.    Genitourinary: Negative.    Musculoskeletal: Negative.    Skin: Negative.    Allergic/Immunologic: Negative.    Neurological: Negative.    Hematological: Negative.    Psychiatric/Behavioral: Negative.      Objective:     Vital Signs (Most Recent):  Temp: 98.3 °F (36.8 °C) (08/20/17 0701)  Pulse: (!) 53 (08/20/17 0701)  Resp: 18 (08/20/17 0701)  BP: (!) 126/58 (08/20/17 0701)  SpO2: 99 % (08/20/17 0840)    Vital Signs (24h Range):  Temp:  [98.3 °F (36.8 °C)-98.8 °F (37.1 °C)] 98.3 °F (36.8 °C)  Pulse:  [53-68] 53  Resp:  [16-18] 18  SpO2:  [93 %-99 %] 99 %  BP: (126-153)/(58-78) 126/58     Physical Exam   Constitutional: He is oriented to person,  place, and time. He appears well-developed and well-nourished. No distress.   HENT:   Head: Normocephalic and atraumatic.   Right Ear: External ear normal.   Left Ear: External ear normal.   Nose: Nose normal.   Mouth/Throat: Oropharynx is clear and moist. No oropharyngeal exudate.   Eyes: Conjunctivae and EOM are normal. Pupils are equal, round, and reactive to light. Right eye exhibits no discharge. Left eye exhibits no discharge. No scleral icterus.   Neck: Normal range of motion. Neck supple. No JVD present. No tracheal deviation present. No thyromegaly present.   Cardiovascular: Normal rate, regular rhythm, normal heart sounds and intact distal pulses.  Exam reveals no gallop and no friction rub.    No murmur heard.  Pulmonary/Chest: Effort normal and breath sounds normal. No stridor. No respiratory distress. He has no wheezes. He has no rales. He exhibits no tenderness.   Abdominal: Soft. Bowel sounds are normal. He exhibits no distension and no mass. There is no tenderness. There is no rebound and no guarding. No hernia.   Genitourinary:   Genitourinary Comments: deferred   Musculoskeletal: Normal range of motion. He exhibits no edema, tenderness or deformity.   Lymphadenopathy:     He has no cervical adenopathy.   Neurological: He is alert and oriented to person, place, and time. He has normal reflexes. He displays normal reflexes. No cranial nerve deficit. He exhibits normal muscle tone. Coordination normal.   Skin: No rash noted. He is not diaphoretic. No erythema. No pallor.   Psychiatric: He has a normal mood and affect. His behavior is normal. Judgment and thought content normal.     NEUROLOGICAL EXAMINATION:     MENTAL STATUS   Oriented to person, place, and time.     CRANIAL NERVES     CN III, IV, VI   Pupils are equal, round, and reactive to light.  Extraocular motions are normal.       Assessment/Plan:      Saman Crockett is a 88 y.o. male admitted to inpatient rehabilitation on 8/9/2017 for  <principal problem not specified> with impaired mobility and ADLs. Patient remains appropriate for PT, OT, and as required Speech therapy. Patient continues to require 24 hour nursing care as well as daily Physician assessment.    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] 08/09/2017 Not Applicable    Type 2 diabetes mellitus with diabetic polyneuropathy, without long-term current use of insulin [E11.42] 05/31/2017 Yes      Problems Resolved During this Admission:    Diagnosis Date Noted Date Resolved POA     S/p R TIFFANIE, cont PT, OT  Pain, managed with current meds  DVT prophylaxis, cont sq Lovenox  DM, accu check noted, cont current meds  HTN, vitals noted, cont current meds      DISCHARGE PLANNING:  Tentative Discharge Date:     Future Appointments  Date Time Provider Department Center   8/30/2017 1:00 PM Rajan Munson MD [facility] [address]       Laura Gutierrez MD  Department of Physical Medicine & Rehab  [facility]

## 2017-08-20 NOTE — PLAN OF CARE
08/19/17 1928   Patient Assessment/Suction   Level of Consciousness (AVPU) alert   Respiratory Effort Normal;Unlabored   PRE-TX-O2-ETCO2   O2 Device (Oxygen Therapy) nasal cannula   $ Is the patient on Oxygen? Yes   Flow (L/min) 1   SpO2 (!) 93 %   Pulse Oximetry Type Intermittent   $ Pulse Oximetry - Multiple Charge Pulse Oximetry - Multiple   Pulse 62   Resp 16   Pt assessed, no distress noted.

## 2017-08-21 LAB
ANION GAP SERPL CALC-SCNC: 10 MMOL/L
BASOPHILS # BLD AUTO: ABNORMAL K/UL
BASOPHILS NFR BLD: 3 %
BUN SERPL-MCNC: 34 MG/DL
CALCIUM SERPL-MCNC: 9.2 MG/DL
CHLORIDE SERPL-SCNC: 108 MMOL/L
CO2 SERPL-SCNC: 21 MMOL/L
CREAT SERPL-MCNC: 1.4 MG/DL
DIFFERENTIAL METHOD: ABNORMAL
EOSINOPHIL # BLD AUTO: ABNORMAL K/UL
EOSINOPHIL NFR BLD: 5 %
ERYTHROCYTE [DISTWIDTH] IN BLOOD BY AUTOMATED COUNT: 14.1 %
EST. GFR  (AFRICAN AMERICAN): 51 ML/MIN/1.73 M^2
EST. GFR  (NON AFRICAN AMERICAN): 45 ML/MIN/1.73 M^2
GLUCOSE SERPL-MCNC: 93 MG/DL
HCT VFR BLD AUTO: 32.5 %
HGB BLD-MCNC: 10.9 G/DL
LYMPHOCYTES # BLD AUTO: ABNORMAL K/UL
LYMPHOCYTES NFR BLD: 41 %
MCH RBC QN AUTO: 31 PG
MCHC RBC AUTO-ENTMCNC: 33.4 G/DL
MCV RBC AUTO: 93 FL
MONOCYTES # BLD AUTO: ABNORMAL K/UL
MONOCYTES NFR BLD: 4 %
NEUTROPHILS NFR BLD: 47 %
NRBC BLD-RTO: 1 /100 WBC
PLATELET # BLD AUTO: 542 K/UL
PLATELET BLD QL SMEAR: ABNORMAL
PMV BLD AUTO: 6.6 FL
POCT GLUCOSE: 150 MG/DL (ref 70–110)
POCT GLUCOSE: 176 MG/DL (ref 70–110)
POCT GLUCOSE: 204 MG/DL (ref 70–110)
POCT GLUCOSE: 229 MG/DL (ref 70–110)
POCT GLUCOSE: 84 MG/DL (ref 70–110)
POTASSIUM SERPL-SCNC: 5 MMOL/L
RBC # BLD AUTO: 3.5 M/UL
SODIUM SERPL-SCNC: 139 MMOL/L
WBC # BLD AUTO: 11.1 K/UL

## 2017-08-21 PROCEDURE — 94761 N-INVAS EAR/PLS OXIMETRY MLT: CPT

## 2017-08-21 PROCEDURE — 97110 THERAPEUTIC EXERCISES: CPT

## 2017-08-21 PROCEDURE — 27000221 HC OXYGEN, UP TO 24 HOURS

## 2017-08-21 PROCEDURE — 12800000 HC REHAB SEMI-PRIVATE ROOM

## 2017-08-21 PROCEDURE — 92526 ORAL FUNCTION THERAPY: CPT

## 2017-08-21 PROCEDURE — 85027 COMPLETE CBC AUTOMATED: CPT

## 2017-08-21 PROCEDURE — 36415 COLL VENOUS BLD VENIPUNCTURE: CPT

## 2017-08-21 PROCEDURE — 63600175 PHARM REV CODE 636 W HCPCS: Performed by: PHYSICAL MEDICINE & REHABILITATION

## 2017-08-21 PROCEDURE — 97535 SELF CARE MNGMENT TRAINING: CPT

## 2017-08-21 PROCEDURE — 25000003 PHARM REV CODE 250: Performed by: PHYSICAL MEDICINE & REHABILITATION

## 2017-08-21 PROCEDURE — 97116 GAIT TRAINING THERAPY: CPT

## 2017-08-21 PROCEDURE — 80048 BASIC METABOLIC PNL TOTAL CA: CPT

## 2017-08-21 PROCEDURE — 97530 THERAPEUTIC ACTIVITIES: CPT

## 2017-08-21 PROCEDURE — 85007 BL SMEAR W/DIFF WBC COUNT: CPT

## 2017-08-21 RX ADMIN — GLIPIZIDE 5 MG: 5 TABLET, FILM COATED, EXTENDED RELEASE ORAL at 08:08

## 2017-08-21 RX ADMIN — GEMFIBROZIL 600 MG: 600 TABLET ORAL at 05:08

## 2017-08-21 RX ADMIN — ENALAPRIL MALEATE 2.5 MG: 2.5 TABLET ORAL at 08:08

## 2017-08-21 RX ADMIN — THERA TABS 1 TABLET: TAB at 08:08

## 2017-08-21 RX ADMIN — ENOXAPARIN SODIUM 40 MG: 100 INJECTION SUBCUTANEOUS at 05:08

## 2017-08-21 RX ADMIN — LEVOTHYROXINE SODIUM 150 MCG: 150 TABLET ORAL at 06:08

## 2017-08-21 RX ADMIN — MEMANTINE HYDROCHLORIDE 28 MG: 28 CAPSULE, EXTENDED RELEASE ORAL at 09:08

## 2017-08-21 RX ADMIN — ESCITALOPRAM 10 MG: 10 TABLET, FILM COATED ORAL at 08:08

## 2017-08-21 RX ADMIN — FERROUS GLUCONATE 324 MG: 324 TABLET ORAL at 08:08

## 2017-08-21 RX ADMIN — ASPIRIN 325 MG ORAL TABLET 325 MG: 325 PILL ORAL at 08:08

## 2017-08-21 RX ADMIN — GEMFIBROZIL 600 MG: 600 TABLET ORAL at 06:08

## 2017-08-21 RX ADMIN — METFORMIN HYDROCHLORIDE 750 MG: 750 TABLET, EXTENDED RELEASE ORAL at 08:08

## 2017-08-21 RX ADMIN — METFORMIN HYDROCHLORIDE 750 MG: 750 TABLET, EXTENDED RELEASE ORAL at 05:08

## 2017-08-21 RX ADMIN — TAMSULOSIN HYDROCHLORIDE 0.4 MG: 0.4 CAPSULE ORAL at 08:08

## 2017-08-21 NOTE — PROGRESS NOTES
[facility]  Physical Medicine & Rehab  Progress Note    Patient Name: Saman Crockett  MRN: [id number]  Patient Class: IP- Rehab   Admission Date: 8/9/2017  Length of Stay: 12 days  Attending Physician: Laura Gutierrez MD  Primary Care Provider: Rajan Munson MD    Subjective:     Principal Problem:  S/p right TIFFANIE  Interval History: pt is 88 y.o male participating with therapy    Scheduled Medications:  aspirin  325 mg Oral BID    enalapril  2.5 mg Oral Daily    enoxaparin  40 mg Subcutaneous Daily    escitalopram oxalate  10 mg Oral Daily    ferrous gluconate  324 mg Oral Daily with breakfast    gemfibrozil  600 mg Oral BID AC    glipiZIDE  5 mg Oral Daily with breakfast    insulin detemir  18 Units Subcutaneous QHS    levothyroxine  150 mcg Oral Before breakfast    memantine  28 mg Oral Daily    metformin  750 mg Oral BID WM    multivitamin  1 tablet Oral Daily    tamsulosin  0.4 mg Oral Daily       PRN Medications: acetaminophen, aluminum-magnesium hydroxide-simethicone, bisacodyl, hydrocodone-acetaminophen 5-325mg, magnesium citrate    Review of Systems   Constitutional: Negative.    HENT: Negative.    Eyes: Negative.    Respiratory: Negative.    Cardiovascular: Negative.    Gastrointestinal: Negative.    Endocrine: Negative.    Genitourinary: Negative.    Musculoskeletal: Negative.    Skin: Negative.    Allergic/Immunologic: Negative.    Neurological: Negative.    Hematological: Negative.    Psychiatric/Behavioral: Negative.      Objective:     Vital Signs (Most Recent):  Temp: 98.4 °F (36.9 °C) (08/21/17 0600)  Pulse: 66 (08/21/17 0600)  Resp: 18 (08/21/17 0600)  BP: (!) 140/68 (08/21/17 0600)  SpO2: 98 % (08/21/17 0704)    Vital Signs (24h Range):  Temp:  [97.7 °F (36.5 °C)-98.4 °F (36.9 °C)] 98.4 °F (36.9 °C)  Pulse:  [53-66] 66  Resp:  [16-18] 18  SpO2:  [95 %-99 %] 98 %  BP: (140-160)/(62-68) 140/68     Physical Exam   Constitutional: He is oriented to person, place, and  time. He appears well-developed and well-nourished. No distress.   HENT:   Head: Normocephalic and atraumatic.   Right Ear: External ear normal.   Left Ear: External ear normal.   Nose: Nose normal.   Mouth/Throat: Oropharynx is clear and moist. No oropharyngeal exudate.   Eyes: Conjunctivae and EOM are normal. Pupils are equal, round, and reactive to light. Right eye exhibits no discharge. Left eye exhibits no discharge. No scleral icterus.   Neck: Normal range of motion. Neck supple. No JVD present. No tracheal deviation present. No thyromegaly present.   Cardiovascular: Normal rate, regular rhythm, normal heart sounds and intact distal pulses.  Exam reveals no gallop and no friction rub.    No murmur heard.  Pulmonary/Chest: Effort normal and breath sounds normal. No stridor. No respiratory distress. He has no wheezes. He has no rales. He exhibits no tenderness.   Abdominal: Soft. Bowel sounds are normal. He exhibits no distension and no mass. There is no tenderness. There is no rebound and no guarding. No hernia.   Genitourinary:   Genitourinary Comments: deferred   Musculoskeletal: Normal range of motion. He exhibits no edema, tenderness or deformity.   Lymphadenopathy:     He has no cervical adenopathy.   Neurological: He is alert and oriented to person, place, and time. He exhibits normal muscle tone.   Skin: He is not diaphoretic.   Incision well healed, staples removed , applied steri strips    Psychiatric: He has a normal mood and affect. His behavior is normal.     NEUROLOGICAL EXAMINATION:     MENTAL STATUS   Oriented to person, place, and time.     CRANIAL NERVES     CN III, IV, VI   Pupils are equal, round, and reactive to light.  Extraocular motions are normal.       Assessment/Plan:      Saman Crockett is a 88 y.o. male admitted to inpatient rehabilitation on 8/9/2017 for <principal problem not specified> with impaired mobility and ADLs. Patient remains appropriate for PT, OT, and as required  Speech therapy. Patient continues to require 24 hour nursing care as well as daily Physician assessment.    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] 08/09/2017 Not Applicable    Type 2 diabetes mellitus with diabetic polyneuropathy, without long-term current use of insulin [E11.42] 05/31/2017 Yes      Problems Resolved During this Admission:    Diagnosis Date Noted Date Resolved POA     S/p R TIFFANIE , cont PT, OT  Pain, managed with current meds  DVT prophylaxis, cont sq Lovenox  HTN, vitals noted, cont current meds  DM, accu check noted, cont current med      DISCHARGE PLANNING:  Tentative Discharge Date:     Future Appointments  Date Time Provider Department Center   8/30/2017 1:00 PM Rajan Munson MD [facility] [address]       Laura Gutierrez MD  Department of Physical Medicine & Rehab  [facility]

## 2017-08-21 NOTE — PT/OT/SLP PROGRESS
Occupational Therapy  Treatment    Saman Crockett   MRN: [id number]   Admitting Diagnosis: femur fx with displacement s/p R hip arthroplasty     OT Date of Treatment: 08/21/17   Total Time (min): 70 min    Billable Minutes:  Self Care/Home Management 30, Therapeutic Activity 25 and Therapeutic Exercise 15  Total Minutes: 70    General Precautions: Standard, fall, hearing impaired  Orthopedic Precautions: RLE weight bearing as tolerated, RLE anterior precautions  Braces: N/A    Do you have any cultural, spiritual, Jew conflicts, given your current situation?: None    Subjective:  Communicated with nurse prior to session.    Pain/Comfort  Pain Rating 1: 0/10    Objective:  Patient found with:  (w/c seatbelt ) in pt dining room     Functional Mobility:  Feeding:  Supervision to prepare toast with butter and jelly and Supervision with verbal cues to feed self toast; pt found with both nectar and thin liquids on tray - per nurse, pt is insisting on drinking thin liquids - pt drinking last 2 to 3 sips of regular coffee with Supervision with 3 episodes of coughing after completely finished; pt Set up with peanut butter / butter and jelly sandwich at end of session with pt's spouse, 2 dtrs, and SLP present    Additional Treatment:  - UBE 2 sets x 4 mins to facilitate UB endurance; pt requires short rest break after 2 and a half mins  - Wheelchair propulsion > 50 ft with Supervision  - ALISTAIR bryant with additional 8# x 4 mins to increase strength shoulder flex/extension and elbow flex/extension, shoulder ABD/ADD with rest breaks as needed   - OTR, patient, and pt's 2 daughters present and participating in 50 minute family training session with emphasis on safety, fall prevention including removal of throw rugs, bathroom mats, cords, and managing pets safely at home (instructing family to put dogs outside until pt arrives at home and is in seated position before allowing them back inside) and accessible placement of commonly  used items, etc - pt and family verbalize & demonstrate understanding and in agreement    - DME recommendations: bedside commode, RW, and wheelchair; OTR instructing on correct/safe set up of DME in home environment - BSC used over the toilet during the day and bedside at night - educated on set up to prevent tilting or sliding when at bedside and set up over toilet; instructed on safe use of built-in bench in walk-in shower at home, safe use of the suction grab bar they already own & use (daily checking to ensure the suction is gripping as it should), pt and family instructed that patient should not attempt to shower until home health nurse arrives and is able to assess and instruct on best transfer technique, removal of bathroom mats, use of non-slip mat in shower & handheld shower head (already own and use) family verbalizes/demonstrates understanding when appropriate and in agreement  - OTR instructing on assistance level patient requires to perform/complete ADL and instructing on use of adaptive equipment to increase independence (sock aid, reacher, BSC, TTB) and requires assist to don/doff R sock  - OTR providing education regarding ordering of DME, what to expect day of D/C, etc  - OTR recommending and reinforcing throughout family training session - pt requires 24/7 supervision especially during shower transfer and bathing secondary to cognitive (memory) deficits - pt verbalizes & demonstrates understanding and in agreement when appropriate; family with questions regarding pt's memory - family re-assured OTR to communicate concerns to Huan KAY - family verbalizes/demonstrates understanding and in agreement when appropriate    Patient left with bed in chair position with all lines intact, call button in reach, bed alarm on, nurse notified and pt's 2 daughters, spouse, and SLP present    ASSESSMENT:  Saman Crockett is a 88 y.o. male with a medical diagnosis of femur fx with displacement s/p R hip  arthroplasty and presents with improvements towards OT goals. Patient, pt's spouse & 2 daughters present and participating in 50 minute family training session. See above for details. Patient should continue to benefit from skilled OT services per est POC to increase independence, mobility, and safety.     Rehab potential is good    Activity tolerance: Good to fair    Discharge recommendations: home     Equipment recommendations:  (tbd)     GOALS:    Occupational Therapy Goals        Problem: Occupational Therapy Goal    Goal Priority Disciplines Outcome Interventions   Occupational Therapy Goal     OT, PT/OT Ongoing (interventions implemented as appropriate)    Description:  Goals to be met by: 8/24/17     Patient will increase functional independence with ADLs by performing:    Feeding with [place].  UE Dressing with Supervision.  LE Dressing with Minimal Assistance with DME/AE as needed.  Grooming while seated with [place].  Toileting from toilet with Moderate Assistance for hygiene and clothing management.   Bathing from shower chair/bench with Minimal Assistance.  Shower transfer with Supervision.  Toilet transfer to toilet with Modified [place].                        Plan:  Patient to be seen 6 x/week to address the above listed problems via self-care/home management, community/work re-entry, therapeutic activities, therapeutic exercises, therapeutic groups, wheelchair management/training  Plan of Care expires: 08/24/17  Plan of Care reviewed with: patient    CHEL Prince LOTR  08/21/2017

## 2017-08-21 NOTE — PLAN OF CARE
Problem: Patient Care Overview  Goal: Plan of Care Review  Outcome: Ongoing (interventions implemented as appropriate)  AAOx3 vss denies pain tolerating therapy well

## 2017-08-21 NOTE — PLAN OF CARE
Problem: Occupational Therapy Goal  Goal: Occupational Therapy Goal  Goals to be met by: 8/24/17     Patient will increase functional independence with ADLs by performing:    Feeding with [place].  UE Dressing with Supervision.  LE Dressing with Minimal Assistance with DME/AE as needed.  Grooming while seated with [place].  Toileting from toilet with Moderate Assistance for hygiene and clothing management.   Bathing from shower chair/bench with Minimal Assistance.  Shower transfer with Supervision.  Toilet transfer to toilet with Modified [place].       Outcome: Ongoing (interventions implemented as appropriate)  OT goals remain appropriate

## 2017-08-21 NOTE — PROGRESS NOTES
Pt with ckd III.  His renal function is stable    Keep euvolemic  Keep sbp greater than 90  Avoid use of nsaids and coxibs    Please call if further assistance is needed

## 2017-08-21 NOTE — PT/OT/SLP PROGRESS
From: Imani Dooley  To: Bobo Evans MD  Sent: 4/7/2017 10:09 AM CDT  Subject: RX REFILL    Good morning,    When I picked up my refill from my pharmacy for April for Vyvanse 40mg, they only had a quantity of 24 available which they did fill for me. They stated I would need an RX from my physician to fill the remaining 6 pills . May I please come pick that RX up next week?  Thank you!  Imani Dooley  [phone]  [birth year]   Speech Language Pathology Treatment          Saman Crockett   MRN: [id number]     Diet recommendations: Solid Diet Level: Regular  Liquid Diet Level: Nectar Thick   · No straws   · Small bites/sips   · Alternating bites/sips   · Meds whole buried in puree   · Eliminate distractions   · Assistance with thickening liquids  · Given Pt noncompliance w/ thickened water, recommend initiation and reinforcement of Lantigua Water Protocol  ? unthickend water allowed ONLY between meals (at least 30 minutes after PO intake)     SLP Treatment Date: 08/21/17  Speech Start Time: 1421     Speech Stop Time: 1453     Speech Total (min): 32 min       TREATMENT BILLABLE MINUTES:  Treatment Swallowing Dysfunction 15 and Seld Care/Home Management Training 17    General Precautions: Standard, fall, nectar thick, hearing impaired        Subjective:  Pt spouse and daughter present at bedside with questions re: short term vs long term memory    Objective:   1. Pt will tolerate nectar thick liquids with no overt s/s of aspiration or changes in vocal quality  2. Pt and family will demonstrate compliance with lantigua water protocol     Pain/Comfort  Pain Rating 1: 0/10    Assessment:  Saman Crockett is a 88 y.o. male with a SLP diagnosis of severe short term memory deficits and pharyngeal dysphagia. Pt required mod verbal and visual cues to utilize external aids for orientation to day/schedule and to recall recent events. Spouse and daughter present at bedside and provided with extensive education re: swallow A&P, s/s of aspiration/penetration present when Pt drinking thin liquids, benefits and recommendations of nectar thick liquid and lantigua water protocol. Thoroughly explained benefits of lantigua water protocol and potential consequences of recurring aspiration w/ foreign material and further impact of reduced mobility. Along with verbal explanation, reference to swallow recommendations/handout present on Pt whiteboard was also made.  Family appeared receptive to recommendations and attentive throughout demonstration of thickening procedures. Family with concerns re: Pt's declining short term memory and confusion. Explained differences between long term and short term memory, age related cognitive deficits, and compensatory strategies to improve orientation.    Pt tolerated 4oz of nectar thick liquid and full sandwich w/ no overt s/s of aspiration or changes in VQ.     Diet recommendations:        Liquid Diet Level: Nectar Thick  Solid diet level: regular     Discharge recommendations: Discharge Facility/Level Of Care Needs: home     Goals:    SLP Goals        Problem: SLP Goal    Goal Priority Disciplines Outcome   SLP Goal     SLP Ongoing (interventions implemented as appropriate)   Description:  Long term goals:  1. Pt will use appropriate memory strategies to schedule and recall weekly activities, express needs and recall names to maintain safety and participate socially in functional living environment.     Short term goals:  1. Pt will demonstrate use of memory strategies with 80% acc given verbal and/or visual cues with 80% acc following review.  2. Pt will recall a 3 part functional message related to ADL's with min verbal and visual assistance immediately after review with 80% acc.  3. Pt will recall daily activities via retelling/answering questions with 80% acc given min verbal and visual assistance   4. Pt will use external memory aids and compensatory strategies to recall routine, personal information, and recent events to improve orientation to time & recall daily events with 80% acc and min verbal and/or visual cues.   5. Pt will participate in ongoing assessment of reasoning, problem solving and safety awareness skills                      Plan:   Patient to be seen Therapy Frequency: 5 x/week   Plan of Care Expires: 08/23/17  Plan of Care reviewed with: patient, family  SLP Follow-up?: Yes  SLP - Next Visit Date: 08/22/17            ST Berna 8/21/2017

## 2017-08-21 NOTE — PLAN OF CARE
Problem: Patient Care Overview  Goal: Plan of Care Review  Outcome: Ongoing (interventions implemented as appropriate)  Bed alarm on at all times, side rails up for safety, call light is within easy reach , instructed patient to call at any time for assistance.  Patient is able to ambulate to the bathroom during H.S. hours using the walker and SBA, with a staff member.  Encouraged patient to continue active ROM, but also reminded patient to call with use of the call light at all times before attempting to get out of bed.   Patient stated that he understood instructions given. Patient is able to turn and reposition as needed during HS hours.  Resp are even and unlabored, lungs sound clear, no s & s of distress noted at this time, although I need to continue to encourage patient to keep his oxygen tubing on and to take deep cleansing breaths  Will continue to monitor and observe during HS hours.Patient will be kept pain free, during shift.

## 2017-08-21 NOTE — PT/OT/SLP PROGRESS
"Physical Therapy         Treatment        Saman Crockett   MRN: [id number]     PT Received On: 17  Total Time (min): 90        Billable Minutes:  Gait [id number], Therapeutic Activity 35 and Therapeutic Exercise 45  Total Minutes: 90    Treatment Type: Treatment  PT/PTA: PT     PTA Visit Number: 0       General Precautions: Standard, fall  Orthopedic Precautions: Orthopedic Precautions : RLE weight bearing as tolerated, RLE anterior precautions        Objective:  Patient found seated in w/c, in NAD, family present for family training at 1:00 p.m.      Transfer Training:  Sit to stand:Stand-by Assistance with Rolling Walker  x 10  Car Transfer:  Stand Pivot with Stand-by Assistance with Rolling Walker to/from    Wheelchair Trainin' with SBA    Gait Trainin' x 3 with RW with SBA and cues    Stair Training: ascend/descend 12 4" steps and 4 6" steps with rails with SBA and cues      Additional Treatment:  SEATED: LAQ and hip flexion (L with 3#), hip abduction with yellow tband, hip adduction with ball, ankle DF/PF, knee flexion with red tband, x 30 reps each  STANDING: with RW: ankle PF, ankle DF, minisquats, x 30 reps each  SciFit StepOne: L 2.0 x 15 min, LEs only       Activity Tolerance:  Patient tolerated treatment well    Patient left up in chair with OT notified and family present.    Assessment:  Saman Crockett is a 88 y.o. male     Rehab potential is good.    Activity tolerance: Fair    Equipment recommendations:   RW, wheelchair    GOALS:    Physical Therapy Goals        Problem: Physical Therapy Goal    Goal Priority Disciplines Outcome Goal Variances Interventions   Physical Therapy Goal     PT/OT, PT Ongoing (interventions implemented as appropriate)     Description:  Goals to be met by: 2017     Patient will increase functional independence with mobility by performin. Supine to sit with Stand-by Assistance  2. Sit to supine with Stand-by Assistance  3. Sit to stand transfer " with Stand-by Assistance  4. Bed to chair transfer with Stand-by Assistance using Rolling Walker  5. Gait  x 200 feet with Stand-by Assistance using Rolling Walker.   6. Wheelchair propulsion x 200 feet with Modified [place] using bilateral uppper extremities  7. Ascend/descend at least 4 stair with left Handrail Contact Guard Assistance.                      PLAN:    Patient to be seen daily  to address the above listed problems via gait training, therapeutic activities, therapeutic exercises, neuromuscular re-education, wheelchair management/training  Plan of Care expires: 08/25/17  Plan of Care reviewed with: patient         Jeffrey ZUNIGA Katherine, PT 8/21/2017

## 2017-08-21 NOTE — PLAN OF CARE
Problem: SLP Goal  Goal: SLP Goal  Long term goals:  1. Pt will use appropriate memory strategies to schedule and recall weekly activities, express needs and recall names to maintain safety and participate socially in functional living environment.     Short term goals:  1. Pt will demonstrate use of memory strategies with 80% acc given verbal and/or visual cues with 80% acc following review.  2. Pt will recall a 3 part functional message related to ADL's with min verbal and visual assistance immediately after review with 80% acc.  3. Pt will recall daily activities via retelling/answering questions with 80% acc given min verbal and visual assistance   4. Pt will use external memory aids and compensatory strategies to recall routine, personal information, and recent events to improve orientation to time & recall daily events with 80% acc and min verbal and/or visual cues.   5. Pt will participate in ongoing assessment of reasoning, problem solving and safety awareness skills    Outcome: Ongoing (interventions implemented as appropriate)  Goals remain appropriate. STM compensatory strategies, l[place]ig[place] water protocol and aspiration precautions provided to family.

## 2017-08-21 NOTE — PLAN OF CARE
08/20/17 2100   Patient Assessment/Suction   Level of Consciousness (AVPU) alert   PRE-TX-O2-ETCO2   O2 Device (Oxygen Therapy) nasal cannula   Flow (L/min) 2   Oxygen Concentration (%) 28   SpO2 95 %   Pulse Oximetry Type Intermittent   Pulse 60   Resp 18   Ready to Wean/Extubation Screen   FIO2<=50 (chart decimal) 0.28

## 2017-08-22 LAB
POCT GLUCOSE: 219 MG/DL (ref 70–110)
POCT GLUCOSE: 67 MG/DL (ref 70–110)
POCT GLUCOSE: 95 MG/DL (ref 70–110)

## 2017-08-22 PROCEDURE — 94761 N-INVAS EAR/PLS OXIMETRY MLT: CPT

## 2017-08-22 PROCEDURE — 97803 MED NUTRITION INDIV SUBSEQ: CPT

## 2017-08-22 PROCEDURE — 25000003 PHARM REV CODE 250: Performed by: PHYSICAL MEDICINE & REHABILITATION

## 2017-08-22 PROCEDURE — 97535 SELF CARE MNGMENT TRAINING: CPT

## 2017-08-22 PROCEDURE — 92526 ORAL FUNCTION THERAPY: CPT

## 2017-08-22 PROCEDURE — 97116 GAIT TRAINING THERAPY: CPT

## 2017-08-22 PROCEDURE — 97530 THERAPEUTIC ACTIVITIES: CPT

## 2017-08-22 PROCEDURE — 12800000 HC REHAB SEMI-PRIVATE ROOM

## 2017-08-22 PROCEDURE — 92507 TX SP LANG VOICE COMM INDIV: CPT

## 2017-08-22 PROCEDURE — 63600175 PHARM REV CODE 636 W HCPCS: Performed by: PHYSICAL MEDICINE & REHABILITATION

## 2017-08-22 PROCEDURE — 97110 THERAPEUTIC EXERCISES: CPT

## 2017-08-22 RX ADMIN — ASPIRIN 325 MG ORAL TABLET 325 MG: 325 PILL ORAL at 08:08

## 2017-08-22 RX ADMIN — GEMFIBROZIL 600 MG: 600 TABLET ORAL at 05:08

## 2017-08-22 RX ADMIN — ENOXAPARIN SODIUM 40 MG: 100 INJECTION SUBCUTANEOUS at 05:08

## 2017-08-22 RX ADMIN — MEMANTINE HYDROCHLORIDE 28 MG: 28 CAPSULE, EXTENDED RELEASE ORAL at 08:08

## 2017-08-22 RX ADMIN — ESCITALOPRAM 10 MG: 10 TABLET, FILM COATED ORAL at 08:08

## 2017-08-22 RX ADMIN — ASPIRIN 325 MG ORAL TABLET 325 MG: 325 PILL ORAL at 09:08

## 2017-08-22 RX ADMIN — HYDROCODONE BITARTRATE AND ACETAMINOPHEN 2 TABLET: 5; 325 TABLET ORAL at 11:08

## 2017-08-22 RX ADMIN — ENALAPRIL MALEATE 2.5 MG: 2.5 TABLET ORAL at 08:08

## 2017-08-22 RX ADMIN — LEVOTHYROXINE SODIUM 150 MCG: 150 TABLET ORAL at 05:08

## 2017-08-22 RX ADMIN — THERA TABS 1 TABLET: TAB at 08:08

## 2017-08-22 RX ADMIN — GLIPIZIDE 5 MG: 5 TABLET, FILM COATED, EXTENDED RELEASE ORAL at 08:08

## 2017-08-22 RX ADMIN — TAMSULOSIN HYDROCHLORIDE 0.4 MG: 0.4 CAPSULE ORAL at 08:08

## 2017-08-22 RX ADMIN — FERROUS GLUCONATE 324 MG: 324 TABLET ORAL at 08:08

## 2017-08-22 RX ADMIN — METFORMIN HYDROCHLORIDE 750 MG: 750 TABLET, EXTENDED RELEASE ORAL at 05:08

## 2017-08-22 RX ADMIN — METFORMIN HYDROCHLORIDE 750 MG: 750 TABLET, EXTENDED RELEASE ORAL at 08:08

## 2017-08-22 NOTE — PROGRESS NOTES
"[facility]  Adult Nutrition  Progress Note    SUMMARY     Recommendations    Recommendation/Intervention: continue diabetic diet 2000 calories with pt preferences  Goals: pt will consume at least 75% of meals  Nutrition Goal Status: met/ongoing  Communication of RD Recs:  (care plan)    Discharge Plan: diabetic diet     Reason for Assessment    Reason for Assessment: RD follow up  1. Renal insufficiency    2. S/P total hip arthroplasty, right      Past Medical History:   Diagnosis Date    Diabetes mellitus     Elevated PSA     Hyperlipidemia     Kidney stone     Thyroid disease        Interdisciplinary Rounds:  Attended multi disciplinary rounds     General Information Comments: Admitted r/t impaired mobility. Pt reports good appetite, however meal intake records show varied appt which is declining since admit.  Encouraged pt preferences and education family to ordering system.  Wt appears to be dropping jerry    8/22/17 Pt reports "ok appetite". Meal intake records reflect this.  Wt appears to have dropped 4 lbs x 1 day.  Will continue to monitor for wt trends.  Wt Readings from Last 1 Encounters:   08/19/17 0600 81.6 kg (180 lb)   08/18/17 1352 83.6 kg (184 lb 4.9 oz)   08/13/17 0037 83.6 kg (184 lb 3.2 oz)   08/09/17 2200 83.5 kg (184 lb)         Nutrition Prescription Ordered    Current Diet Order: diabetic 2000     Evaluation of Received Nutrients/Fluid Intake     Energy Calories Required: not meeting needs  Protein Required: not meeting needs   Fluid Required: meeting needs   % Intake of Estimated Energy Needs: %  % Meal Intake: Other: 79% average over last 6 documented meals     Nutrition Risk Screen     Nutrition Risk Screen: no indicators present    Nutrition/Diet History    Patient Reported Diet/Restrictions/Preferences: general  Food Preferences: No cultural or religous food preferences identified.      Labs/Tests/Procedures/Meds    Diagnostic Test/Procedure Review: " reviewed, pertinent  Pertinent Labs Reviewed: reviewed, pertinent      BMP  Lab Results   Component Value Date     2017    K 5.0 2017     2017    CO2 21 (L) 2017    BUN 34 (H) 2017    CREATININE 1.4 2017    CALCIUM 9.2 2017    ANIONGAP 10 2017    ESTGFRAFRICA 51 (A) 2017    EGFRNONAA 45 (A) 2017       Recent Labs  Lab 17  0636   POCTGLUCOSE 95     No results found for: LABA1C, HGBA1C  No results found for: CHOL  No results found for: HDL  No results found for: LDLCALC  No results found for: TRIG  No results found for: CHOLHDL    Pertinent Medications Reviewed: reviewed, pertinent      Scheduled Meds:   aspirin  325 mg Oral BID    enalapril  2.5 mg Oral Daily    enoxaparin  40 mg Subcutaneous Daily    escitalopram oxalate  10 mg Oral Daily    ferrous gluconate  324 mg Oral Daily with breakfast    gemfibrozil  600 mg Oral BID AC    glipiZIDE  5 mg Oral Daily with breakfast    insulin detemir  18 Units Subcutaneous QHS    levothyroxine  150 mcg Oral Before breakfast    memantine  28 mg Oral Daily    metformin  750 mg Oral BID WM    multivitamin  1 tablet Oral Daily    tamsulosin  0.4 mg Oral Daily     Continuous Infusions:         Physical Findings     Oral/Mouth Cavity: tooth/teeth missing  Skin:  (Kirit score 19)    Anthropometrics    Temp: 98.7 °F (37.1 °C)  Height: 6'  Weight: 81.6 kg (180 lb)  Ideal Body Weight (IBW), Male: 178 lb  % Ideal Body Weight, Male (lb): 103.54 lb  BMI (Calculated): 25   Usual Body Weight (UBW), k.8 kg (per chart review 17)  % Usual Body Weight: 101.18  % Weight Change From Usual Weight: -1.18 %     Estimated/Assessed Needs    Weight Used For Calorie Calculations: 83.6 kg (184 lb 4.9 oz)   Height (cm): 182.9 cm  Energy Need Method: Mason-St Jeor  RMR (Mason-St. Jeor Equation): 1544 x 1.25 PAL= 1930   Weight Used For Protein Calculations: 83.6 kg (184 lb 4.9 oz) (1.0-1.2)  1.0 gm  Protein (gm): 83.77 and 1.2 gm Protein (gm): 100.53  Fluid Need Method: RDA Method (or per MD rec)   CHO Requirement: 225 gms CHO (45% of 2000 calories)    Assessment and Plan  Type 2 diabetes mellitus with diabetic polyneuropathy, without long-term current use of insulinType 2 diabetes mellitus with diabetic polyneuropathy, without long-term current use of insulin    Related to (etiology):   Chronic disease    Signs and Symptoms (as evidenced by)  Presence of diabetes with altered nutrition related labs      Interventions/Recommendations (treatment strategy):  Continue diabetic diet with consistent CHO     Nutrition Diagnosis Status:   continues      Monitor and Evaluation    Food and Nutrient Intake: energy intake  Food and Nutrient Adminstration: diet order   Anthropometric Measurements: weight, weight change  Biochemical Data, Medical Tests and Procedures: electrolyte and renal panel, glucose/endocrine profile, lipid profile  Nutrition-Focused Physical Findings: overall appearance, skin    Nutrition Risk    Level of Risk:  (1 x weekly)    Nutrition Follow-Up    RD Follow-up?: Yes

## 2017-08-22 NOTE — PLAN OF CARE
Problem: Diabetes, Type 2 (Adult)  Goal: Signs and Symptoms of Listed Potential Problems Will be Absent, Minimized or Managed (Diabetes, Type 2)  Signs and symptoms of listed potential problems will be absent, minimized or managed by discharge/transition of care (reference Diabetes, Type 2 (Adult) CPG).   Recommendation/Intervention: continue diabetic diet 2000 calories with pt preferences  Goals: pt will consume at least 75% of meals  Nutrition Goal Status: met/ongoing  Communication of RD Recs:  (care plan)

## 2017-08-22 NOTE — PROGRESS NOTES
Team conference attended and patient discussed. Spoke with patient to discuss discharge for tomorrow.  Spoke with patient's wife to update and discuss discharge plans. SW will order DME and refer to [facility].

## 2017-08-22 NOTE — ASSESSMENT & PLAN NOTE
Related to (etiology):   Chronic disease    Signs and Symptoms (as evidenced by)  Presence of diabetes with altered nutrition related labs      Interventions/Recommendations (treatment strategy):  Continue diabetic diet with consistent CHO     Nutrition Diagnosis Status:   continues

## 2017-08-22 NOTE — PT/OT/SLP PROGRESS
Occupational Therapy  Treatment    Saman Crockett   MRN: [id number]   Admitting Diagnosis: femur fx with displacement s/p R hip arthroplasty     OT Date of Treatment: 08/22/17   Total Time (min): 85 min    Billable Minutes:  Self Care/Home Management 60 and Therapeutic Activity 25  Total Minutes: 85    General Precautions: Standard, aspiration, fall, hearing impaired ([place])  Orthopedic Precautions: RLE weight bearing as tolerated, RLE anterior precautions  Braces: N/A    Do you have any cultural, spiritual, Holiness conflicts, given your current situation?: None    Subjective:  Communicated with nurse prior to session.    Pain/Comfort  Pain Rating 1: 0/10    Objective:  Patient found with: bed alarm    Functional Mobility:  Bed Mobility:   Supine to sit: Supervision or Set-up Assistance   Sit to supine: Standby Assistance    Transfer Training:  Sit to stand:Supervision or Set-up Assistance with Rolling Walker 4+ times throughout session to perform self care tasks  Bed <> Chair:  Stand Pivot with Supervision or Set-up Assistance with Rolling Walker verbal cues for safety  Toilet Transfer:  Pt Stand Pivot with Supervision or Set-up Assistance with Rolling Walker and bedside commode positioned over toilet - performed 3 x throughout session - pt needing to toilet 2x before shower and 1x after shower for 2 BMs  Tub bench transfer Stand Pivot with Stand-by Assistance with Rolling Walker - performed twice as pt needing to toilet again directly after sitting on tub bench   >>>> Patient ambulating with SBA and RW EOB > toilet > tub bench > toilet > tub bench > sink in pt room > wheelchair next to bed approximately 40 ft total no LOB   >>>> Patient ambulating with SBA and RW from wheelchair positioned outside of bathroom door > toilet > sink > wheelchair approximately 20 ft total with no LOB     Grooming:  Standing sink side with Supervision and RW, pt combing hair, brushing teeth, and washing hands     Bathing:  Patient  performed bathing with Stand-by Assistance with grab bar, Handheld shower head and tub bench at Shower.    UE Dressing:  Patient performed UE Dressing with Supervision or Set-up Assistance at tub bench     LE Dressing:  Patient don/doffed socks with Moderate Assistance to don R sock - doffing socks with reacher - pt requires reinforcement of instruction to use - pt's daughter present and instructed on techniques to use reacher to remove socks (demonstration provided) - pt's dtr verbalizes/demonstrates understanding   Patient don/doffed boxers and long pants with Stand-by Assistance - pt's dtr instructed on adaptive techniques to increase safety and independence with task    Toilet Training:  Pt performed toileting with Modified [place] with RW and bedside commode positioned over toilet - performed 3 x throughout session for 2 BMs    Balance:   Static Sit: GOOD: Takes MODERATE challenges from all directions  Dynamic Sit:  GOOD-: Maintains balance through MODERATE excursions of active trunk movement,     Static Stand: GOOD-: Takes MODERATE challenges from all directions inconsistently to GOOD    Additional Treatment:  - OTR reinforcing importance of 24/7 Supervision for pt safety in home environment to pt's spouse and pt's daughter - both verbalize & demonstrate understanding and pt's spouse reports that pt will not be alone at any time  - OTR instructing pt's daughter and reinforcing instruction to pt regarding use of adaptive equipment to increase pt's independence and safety with LB dressing tasks - verbalizes/demonstrates understanding  - OTR reinforcing safety awareness and adaptive techniques to increase independence and safety with self care tasks/functional mobility  - Wheelchair propulsion > 50 ft with Supervision  - UBE 2 sets x 2 and half mins with rest break between as pt reports needing to return to room to toilet  - In standing with L UE support of high/low table as needed, pt playing basketball  with approximately 85% accuracy to facilitate standing endurance and standing balance - pt requires seated rest break between 2 sets of standing x 30 seconds 1st trial and 45 seconds 2nd trial with no LOB     Patient left HOB elevated with all lines intact, call button in reach, bed alarm on, nurse notified and pt's daughter and spouse present - Jackson VALDES notified that pt's family ready to receive education - Jackson acknowledging     ASSESSMENT:  Saman Crockett is a 88 y.o. male with a medical diagnosis of femur fx with displacement s/p R hip arthroplasty - pt's spouse and daughter present for nearly all of OT session today and reinforcement of instruction/education regarding safety in home environment provided with emphasis on pt having 24/7 Supervision for safety due to cognitive deficits. See above.     Rehab potential is good    Activity tolerance: Good    Discharge recommendations: home, home with home health, home health OT     Equipment recommendations: bedside commode     GOALS:    Occupational Therapy Goals        Problem: Occupational Therapy Goal    Goal Priority Disciplines Outcome Interventions   Occupational Therapy Goal     OT, PT/OT Ongoing (interventions implemented as appropriate)    Description:  Goals to be met by: 8/24/17     Patient will increase functional independence with ADLs by performing:    Feeding with [place].  UE Dressing with Supervision.  LE Dressing with Minimal Assistance with DME/AE as needed.  Grooming while seated with [place].  Toileting from toilet with Moderate Assistance for hygiene and clothing management.   Bathing from shower chair/bench with Minimal Assistance.  Shower transfer with Supervision.  Toilet transfer to toilet with Modified [place].                        Plan:  Patient to be seen 6 x/week to address the above listed problems via self-care/home management, community/work re-entry, therapeutic activities, therapeutic exercises, therapeutic  groups, neuromuscular re-education, cognitive retraining, wheelchair management/training  Plan of Care expires: 08/24/17  Plan of Care reviewed with: patient, daughter, spouse    CHEL Prince LOTR  08/22/2017

## 2017-08-22 NOTE — NURSING
Blood Sugar 67. Patient asymptomatic. [place] thickened Orange Juice given.[id number] Blood Sugar up to 95.

## 2017-08-22 NOTE — PT/OT/SLP PROGRESS
Physical Therapy         D/C Summary        Saman Crockett   MRN: [id number]     PT Received On: 17  Total Time (min): 75        Billable Minutes:  Gait [id number], Therapeutic Activity 10 and Therapeutic Exercise 45  Total Minutes: 75    Treatment Type: Treatment  PT/PTA: PT     PTA Visit Number: 0       General Precautions: Standard, fall  Orthopedic Precautions: Orthopedic Precautions : RLE weight bearing as tolerated, RLE anterior precautions      Subjective:  Pain/Comfort  Pain Rating 1: 410  Location - Side 1: Right  Location 1: leg  Pain Addressed 1: Reposition, Distraction, Cessation of Activity  Pain Rating Post-Intervention 1: 10    Objective:  Patient found seated in w/c, in NAD.       Functional Mobility:  Bed Mobility:   Supine to sit: Standby Assistance   Sit to supine: Standby Assistance   Rolling: Modified Independent       Transfer Training:  Sit to stand:Stand-by Assistance with Rolling Walker  x 8  Bed <> Chair:  Stand Pivot with Stand-by Assistance with Rolling Walker  x 2    Wheelchair Training: mod I    Gait Trainin' x 2, 200' x 1 with SBA    Additional Treatment:  SEATED: LAQ and hip flexion (L with 3#), hip abduction with yellow tband, hip adduction with ball, ankle DF/PF, knee flexion with red tband, x 30 reps each  STANDING: with RW: ankle PF, ankle DF, minisquats, x 30 reps each    In // bars: sidekicks (RLE only) x 30 reps  SciFit StepOne: L 1.0 x 10 min, LEs only      Activity Tolerance:  Patient limited by fatigue and Patient limited by pain    Patient left up in chair with call button in reach and chair alarm on.    Assessment:  Saman Crockett is a 88 y.o. male     Rehab potential is good.    Activity tolerance: Fair    Discharge recommendations:   Home health PT, transition to outpatient    Equipment recommendations:   use RW with family assist    GOALS:      Physical Therapy Goals        Problem: Physical Therapy Goal    Goal Priority Disciplines Outcome Goal  Variances Interventions   Physical Therapy Goal     PT/OT, PT Ongoing (interventions implemented as appropriate)     Description:  Goals to be met by: 2017     Patient will increase functional independence with mobility by performin. Supine to sit with Stand-by Assistance  2. Sit to supine with Stand-by Assistance  3. Sit to stand transfer with Stand-by Assistance  4. Bed to chair transfer with Stand-by Assistance using Rolling Walker  5. Gait  x 200 feet with Stand-by Assistance using Rolling Walker.   6. Wheelchair propulsion x 200 feet with Modified [place] using bilateral uppper extremities  7. Ascend/descend at least 4 stair with left Handrail Contact Guard Assistance.     ALL GOALS ACHIEVED                   PLAN:   Review safety and HEP, then D/C PT due to D/C from facility  \    Jeffrey Murphy, PT 2017

## 2017-08-22 NOTE — NURSING
Pt's daughter given written and demonstrational instruction/ education re: administering Lovenox injections to pt at home.  Daughter's questions answered, states full understanding of med administration.Daughter says she also has previously given SQ injections in past.

## 2017-08-22 NOTE — PATIENT CARE CONFERENCE
Weekly Staffing Report      Date Admitted: 8/9/2017 :   Staffing Date: 8/22/2017     Patient Active Problem List   Diagnosis    Elevated PSA    Hypothyroidism    Type 2 diabetes mellitus with diabetic polyneuropathy, without long-term current use of insulin    Eczema of both upper extremities    Insect bites and stings    Lumbar and sacral arthritis    S/P total hip arthroplasty          Team Members Present:  Physician Team Member: Dr Gutierrez  Nursing Team Member: Deven Sylvester RN  Case Management Team Member: Theresa You CM/SW  PT Team Member: Jeffrey Murphy PT  OT Team Member: Chica May OT  SLP Team Member: Huan PICHARDO    Nursing  Skin: Intact, incision stable   Bowel: Continent  Bladder:continent  Last BM: 8-22-17  Diet: diabetic, 2000 calorie  Appetite: good   Pain Level: 4/10    Physical Therapy  Supine to Sit:  standy by assistance  Sit to Stand: standy by assistance  Gait: 150-175 feet standy by assistance Rolling walker  Wheelchair Mobility: > 300 feet standy by assistance  ROM: limited by ant hip precaution on right , otherwise wfl  Stairs:12 four inch steps  standy by assistance  Strength: left 4 to 4+/5, right hip abd & add 2/5, hip flexion 3-/5, ankle 3-/5, knee ext 4-/5    Occupational Therapy  Feeding: supervision  Grooming:modified independent  UED: set up  LED: standy by assistance  Bathing:standy by assistance   Toileting:standy by assistance  Toilet Transfer:  standy by assistance  Tub Transfer:  standy by assistance  Strength: 4+ to 5/5    Speech Therapy  Swallow: + pharyngeal dysp, necter thick liquid  MMS: 22/30  Memory: maximal assist  Receptive Language: modified independent  Expressive Language: modified independent  Problem solving: minimal assist            Summary of Progress:  good    Barriers to Progress/Discharge:     Tolerates 3 hours of therapy: Yes    Comments:     Estimated Length of Stay: 8-23-17

## 2017-08-22 NOTE — PROGRESS NOTES
[facility]  Physical Medicine & Rehab  Progress Note    Patient Name: Saman Crockett  MRN: [id number]  Patient Class: IP- Rehab   Admission Date: 8/9/2017  Length of Stay: 13 days  Attending Physician: Laura Gutierrez MD  Primary Care Provider: Rajan Munson MD    Subjective:     Principal Problem: s/p R TIFFANIE  Interval History: pt is 88 y.o male , s/p R TIFFANIE, particiapting with therapy     Scheduled Medications:  aspirin  325 mg Oral BID    enalapril  2.5 mg Oral Daily    enoxaparin  40 mg Subcutaneous Daily    escitalopram oxalate  10 mg Oral Daily    ferrous gluconate  324 mg Oral Daily with breakfast    gemfibrozil  600 mg Oral BID AC    glipiZIDE  5 mg Oral Daily with breakfast    insulin detemir  18 Units Subcutaneous QHS    levothyroxine  150 mcg Oral Before breakfast    memantine  28 mg Oral Daily    metformin  750 mg Oral BID WM    multivitamin  1 tablet Oral Daily    tamsulosin  0.4 mg Oral Daily       PRN Medications: acetaminophen, aluminum-magnesium hydroxide-simethicone, bisacodyl, hydrocodone-acetaminophen 5-325mg, magnesium citrate    Review of Systems   Constitutional: Negative.    HENT: Negative.    Eyes: Negative.    Respiratory: Negative.    Cardiovascular: Negative.    Gastrointestinal: Negative.    Endocrine: Negative.    Genitourinary: Negative.    Musculoskeletal: Negative.    Skin: Negative.    Allergic/Immunologic: Negative.    Neurological: Negative.    Hematological: Negative.    Psychiatric/Behavioral: Negative.      Objective:     Vital Signs (Most Recent):  Temp: 98.7 °F (37.1 °C) (08/22/17 0500)  Pulse: 60 (08/22/17 0500)  Resp: 18 (08/22/17 0500)  BP: (!) 169/74 (08/22/17 0500)  SpO2: (!) 94 % (08/22/17 0500)    Vital Signs (24h Range):  Temp:  [98.5 °F (36.9 °C)-98.7 °F (37.1 °C)] 98.7 °F (37.1 °C)  Pulse:  [60-68] 60  Resp:  [18] 18  SpO2:  [94 %-98 %] 94 %  BP: (120-169)/(59-74) 169/74     Physical Exam   Constitutional: He is oriented to person,  place, and time. He appears well-developed and well-nourished. No distress.   HENT:   Head: Normocephalic and atraumatic.   Right Ear: External ear normal.   Left Ear: External ear normal.   Nose: Nose normal.   Mouth/Throat: Oropharynx is clear and moist. No oropharyngeal exudate.   Eyes: Conjunctivae and EOM are normal. Pupils are equal, round, and reactive to light. Right eye exhibits no discharge. Left eye exhibits no discharge. No scleral icterus.   Neck: Normal range of motion. Neck supple. No JVD present. No tracheal deviation present. No thyromegaly present.   Cardiovascular: Normal rate, regular rhythm, normal heart sounds and intact distal pulses.  Exam reveals no gallop and no friction rub.    No murmur heard.  Pulmonary/Chest: Effort normal and breath sounds normal. No stridor. No respiratory distress. He has no wheezes. He has no rales. He exhibits no tenderness.   Abdominal: Soft. Bowel sounds are normal. He exhibits no distension and no mass. There is no tenderness. There is no rebound and no guarding. No hernia.   Genitourinary:   Genitourinary Comments: deferred   Musculoskeletal: Normal range of motion. He exhibits no edema, tenderness or deformity.   Lymphadenopathy:     He has no cervical adenopathy.   Neurological: He is alert and oriented to person, place, and time. He has normal reflexes. He displays normal reflexes. No cranial nerve deficit. He exhibits normal muscle tone. Coordination normal.   Skin: No rash noted. He is not diaphoretic. No erythema. No pallor.   Psychiatric: He has a normal mood and affect. His behavior is normal. Judgment and thought content normal.     NEUROLOGICAL EXAMINATION:     MENTAL STATUS   Oriented to person, place, and time.     CRANIAL NERVES     CN III, IV, VI   Pupils are equal, round, and reactive to light.  Extraocular motions are normal.       Assessment/Plan:      Saman Crockett is a 88 y.o. male admitted to inpatient rehabilitation on 8/9/2017 for  <principal problem not specified> with impaired mobility and ADLs. Patient remains appropriate for PT, OT, and as required Speech therapy. Patient continues to require 24 hour nursing care as well as daily Physician assessment.    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] 08/09/2017 Not Applicable    Type 2 diabetes mellitus with diabetic polyneuropathy, without long-term current use of insulin [E11.42] 05/31/2017 Yes      Problems Resolved During this Admission:    Diagnosis Date Noted Date Resolved POA   s/p R TIFFANIE , cont PT, OT  Pain, managed with current meds  DVT prophylaxis, cont sq Lovenox   HTN, vitals noted, cont current meds  DM, accu check noted, cont current meds      DISCHARGE PLANNING:  Tentative Discharge Date:     Future Appointments  Date Time Provider Department Center   8/30/2017 1:00 PM Rajan Munson MD [facility] [address]       Laura Gutierrez MD  Department of Physical Medicine & Rehab  [facility]

## 2017-08-22 NOTE — PLAN OF CARE
Problem: Patient Care Overview  Goal: Plan of Care Review  Outcome: Ongoing (interventions implemented as appropriate)  Patient awake/alert.No c/o pain noted this shift.Remains continent of B/B. Forgetfullmness noted. Free from falls. Plan of care continued

## 2017-08-23 VITALS
OXYGEN SATURATION: 96 % | WEIGHT: 180 LBS | TEMPERATURE: 99 F | HEART RATE: 60 BPM | RESPIRATION RATE: 18 BRPM | HEIGHT: 72 IN | SYSTOLIC BLOOD PRESSURE: 141 MMHG | DIASTOLIC BLOOD PRESSURE: 66 MMHG | BODY MASS INDEX: 24.38 KG/M2

## 2017-08-23 LAB
POCT GLUCOSE: 184 MG/DL (ref 70–110)
POCT GLUCOSE: 186 MG/DL (ref 70–110)
POCT GLUCOSE: 200 MG/DL (ref 70–110)

## 2017-08-23 PROCEDURE — 63600175 PHARM REV CODE 636 W HCPCS: Performed by: PHYSICAL MEDICINE & REHABILITATION

## 2017-08-23 PROCEDURE — 94761 N-INVAS EAR/PLS OXIMETRY MLT: CPT

## 2017-08-23 PROCEDURE — 25000003 PHARM REV CODE 250: Performed by: PHYSICAL MEDICINE & REHABILITATION

## 2017-08-23 PROCEDURE — 97535 SELF CARE MNGMENT TRAINING: CPT

## 2017-08-23 RX ORDER — GEMFIBROZIL 600 MG/1
600 TABLET, FILM COATED ORAL
Qty: 30 TABLET | Refills: 1 | Status: SHIPPED | OUTPATIENT
Start: 2017-08-23 | End: 2017-10-10 | Stop reason: SDUPTHER

## 2017-08-23 RX ORDER — GLIPIZIDE 5 MG/1
5 TABLET, FILM COATED, EXTENDED RELEASE ORAL
Qty: 30 TABLET | Refills: 1 | Status: SHIPPED | OUTPATIENT
Start: 2017-08-23 | End: 2017-08-30 | Stop reason: SDUPTHER

## 2017-08-23 RX ORDER — FERROUS GLUCONATE 324(38)MG
324 TABLET ORAL
Qty: 30 TABLET | Refills: 1 | Status: SHIPPED | OUTPATIENT
Start: 2017-08-23 | End: 2018-01-10 | Stop reason: ALTCHOICE

## 2017-08-23 RX ORDER — BISACODYL 5 MG
10 TABLET, DELAYED RELEASE (ENTERIC COATED) ORAL DAILY PRN
Qty: 30 TABLET | Refills: 1 | Status: SHIPPED | OUTPATIENT
Start: 2017-08-23 | End: 2017-10-02 | Stop reason: ALTCHOICE

## 2017-08-23 RX ORDER — ESCITALOPRAM OXALATE 10 MG/1
10 TABLET ORAL DAILY
Qty: 30 TABLET | Refills: 1 | Status: SHIPPED | OUTPATIENT
Start: 2017-08-23 | End: 2018-05-22 | Stop reason: SDUPTHER

## 2017-08-23 RX ORDER — HYDROCODONE BITARTRATE AND ACETAMINOPHEN 5; 325 MG/1; MG/1
2 TABLET ORAL 3 TIMES DAILY PRN
Qty: 30 TABLET | Refills: 0 | Status: SHIPPED | OUTPATIENT
Start: 2017-08-23 | End: 2017-08-30 | Stop reason: ALTCHOICE

## 2017-08-23 RX ORDER — METFORMIN HYDROCHLORIDE 750 MG/1
750 TABLET, EXTENDED RELEASE ORAL 2 TIMES DAILY WITH MEALS
Qty: 60 TABLET | Refills: 1 | Status: SHIPPED | OUTPATIENT
Start: 2017-08-23 | End: 2017-11-02 | Stop reason: SDUPTHER

## 2017-08-23 RX ORDER — TAMSULOSIN HYDROCHLORIDE 0.4 MG/1
0.4 CAPSULE ORAL DAILY
Qty: 30 CAPSULE | Refills: 1 | Status: SHIPPED | OUTPATIENT
Start: 2017-08-23 | End: 2017-09-26 | Stop reason: SDUPTHER

## 2017-08-23 RX ORDER — MEMANTINE HYDROCHLORIDE 28 MG/1
28 CAPSULE, EXTENDED RELEASE ORAL DAILY
Qty: 30 CAPSULE | Refills: 1 | Status: SHIPPED | OUTPATIENT
Start: 2017-08-23 | End: 2017-10-02 | Stop reason: DRUGHIGH

## 2017-08-23 RX ORDER — ENALAPRIL MALEATE 2.5 MG/1
2.5 TABLET ORAL DAILY
Qty: 30 TABLET | Refills: 1 | Status: SHIPPED | OUTPATIENT
Start: 2017-08-23 | End: 2018-08-23

## 2017-08-23 RX ORDER — LEVOTHYROXINE SODIUM 150 UG/1
150 TABLET ORAL
Qty: 30 TABLET | Refills: 1 | Status: SHIPPED | OUTPATIENT
Start: 2017-08-23 | End: 2017-08-25 | Stop reason: SDUPTHER

## 2017-08-23 RX ORDER — ASPIRIN 325 MG
325 TABLET ORAL 2 TIMES DAILY
Qty: 30 TABLET | Refills: 1 | Status: SHIPPED | OUTPATIENT
Start: 2017-08-23 | End: 2017-10-02 | Stop reason: ALTCHOICE

## 2017-08-23 RX ORDER — ENOXAPARIN SODIUM 100 MG/ML
40 INJECTION SUBCUTANEOUS DAILY
Qty: 14 SYRINGE | Refills: 0 | Status: SHIPPED | OUTPATIENT
Start: 2017-08-23 | End: 2017-08-30 | Stop reason: ALTCHOICE

## 2017-08-23 RX ADMIN — ASPIRIN 325 MG ORAL TABLET 325 MG: 325 PILL ORAL at 08:08

## 2017-08-23 RX ADMIN — GEMFIBROZIL 600 MG: 600 TABLET ORAL at 06:08

## 2017-08-23 RX ADMIN — FERROUS GLUCONATE 324 MG: 324 TABLET ORAL at 09:08

## 2017-08-23 RX ADMIN — METFORMIN HYDROCHLORIDE 750 MG: 750 TABLET, EXTENDED RELEASE ORAL at 08:08

## 2017-08-23 RX ADMIN — LEVOTHYROXINE SODIUM 150 MCG: 150 TABLET ORAL at 06:08

## 2017-08-23 RX ADMIN — MEMANTINE HYDROCHLORIDE 28 MG: 28 CAPSULE, EXTENDED RELEASE ORAL at 08:08

## 2017-08-23 RX ADMIN — ENALAPRIL MALEATE 2.5 MG: 2.5 TABLET ORAL at 08:08

## 2017-08-23 RX ADMIN — ESCITALOPRAM 10 MG: 10 TABLET, FILM COATED ORAL at 08:08

## 2017-08-23 RX ADMIN — THERA TABS 1 TABLET: TAB at 08:08

## 2017-08-23 RX ADMIN — TAMSULOSIN HYDROCHLORIDE 0.4 MG: 0.4 CAPSULE ORAL at 08:08

## 2017-08-23 RX ADMIN — ENOXAPARIN SODIUM 40 MG: 100 INJECTION SUBCUTANEOUS at 12:08

## 2017-08-23 RX ADMIN — GLIPIZIDE 5 MG: 5 TABLET, FILM COATED, EXTENDED RELEASE ORAL at 09:08

## 2017-08-23 NOTE — PLAN OF CARE
Problem: Patient Care Overview  Goal: Plan of Care Review  Outcome: Ongoing (interventions implemented as appropriate)  Patient awake/alert. Forgetfulness remains noted. Medicated x 1 for pain. Remains continent,Free from falls. Plan of care continued

## 2017-08-23 NOTE — PLAN OF CARE
Problem: SLP Goal  Goal: SLP Goal  Long term goals:  1. Pt will use appropriate memory strategies to schedule and recall weekly activities, express needs and recall names to maintain safety and participate socially in functional living environment.     Short term goals:  1. Pt will demonstrate use of memory strategies with 80% acc given verbal and/or visual cues with 80% acc following review.  2. Pt will recall a 3 part functional message related to ADL's with min verbal and visual assistance immediately after review with 80% acc.  3. Pt will recall daily activities via retelling/answering questions with 80% acc given min verbal and visual assistance   4. Pt will use external memory aids and compensatory strategies to recall routine, personal information, and recent events to improve orientation to time & recall daily events with 80% acc and min verbal and/or visual cues.   5. Pt will participate in ongoing assessment of reasoning, problem solving and safety awareness skills    Outcome: Ongoing (interventions implemented as appropriate)  Pt with reduced problem solving, safety awareness and insight to deficits. Demonstrated impulsivity, requiring max cueing to discontinue attempts to get out of bed w/out assistance or walker.

## 2017-08-23 NOTE — DISCHARGE SUMMARY
ATTENDING PHYSICIAN:  Dr. Gutierrez.     DATE OF ADMISSION:  08/09/2017    DATE OF DISCHARGE:  08/23/2017    ADMIT DIAGNOSES:  1.  Status post right total hip arthroplasty.  2.  History of hypertension.  3.  History of diabetes.  4.  History of anemia.  5.  History of coronary artery disease.    DISCHARGE DIAGNOSES:  1.  Status post right total hip arthroplasty.  2.  History of hypertension.  3.  History of diabetes.  4.  History of anemia.  5.  History of coronary artery disease.    CONDITION:  Stable.    ACTIVITY:  As tolerated, fall precaution.    HOSPITAL COURSE:  Mr. Crockett is an 88-year-old gentleman whom during the   course of this hospitalization has remained motivated, has participated in all   aspects of his therapy and has made significant progress.  As of now, his   surgical incision has healed well, staples have been removed and Steri-Strips   has been applied.  The patient is continent of bowel and bladder.  His diet is   2000-calorie ADA.  Appetite is good.  Pain is 4/10, managed with current   medication.  The patient is standby assist for supine to sit and standby assist   for sit to stand.  He is ambulating approximately 150 to 175 feet at standby   assist with use of rolling walker.  He is able to propel his wheelchair for   greater than 300 feet at standby assist.  His range of motion is limited by   anterior hip precautions on the right side, otherwise is within functional   limits.  He is also able to negotiate twelve 4 inches steps at standby assist.    His strengths for left lower extremity is 4 to 4+/5, for right hip abduction and   adduction is 2/5 and right hip flexion is 3-/5 and right ankle is 3-/5 and   right knee extension is 4-/5.  He is at supervision for feeding, modified   independent for grooming, setup for upper body dressing, standby assist for   lower body dressing, standby assist for bathing, toileting, toilet transfer and   tub transfer.  His strength for bilateral upper  extremities are 4+ to 5/5.  He   is demonstrating pharyngeal dysphagia and he is utilizing nectar thick liquid to   avoid aspiration.  His Mini-Mental status is 22/30.  He is at max assist for   memory, modified independent for receptive and expressive language and min   assist for problem solving.  The patient continues to benefit from further   therapy.  With that in mind, arrangement for home health therapy is being made   as the patient has transport patient issue.  Otherwise, as the patient's   situation allow the patient would be arranged for outpatient therapy.    MEDICATIONS:  Aspirin 325 mg 1 p.o. b.i.d., enalapril 2.5 mg 1 p.o. daily,   Lovenox 40 mg subcutaneously daily for additional two-week, escitalopram 10 mg 1   p.o. daily, ferrous gluconate 325 mg 1 p.o. with breakfast, gemfibrozil 600 mg   1 p.o. b.i.d., glipizide 5 mg 1 p.o. with breakfast, insulin detemir 18 units   subQ at bedtime, levothyroxine 150 mcg p.o. breakfast and memantine 28 mg 1 p.o.   daily, metformin 750 mg 1 p.o. b.i.d., multivitamin 1 p.o. daily, tamsulosin   0.4 mg 1 p.o. daily and hydrocodone 5 mg 1 p.o. t.i.d. p.r.n. pain.    FOLLOWUP:  1.  Primary care M.DPatricia  2.  Orthopedic surgeon, call for appointment and the patient will have home   health therapy with followup as outpatient therapy.      AV/HN  dd: 08/23/2017 07:59:02 (CDT)  td: 08/23/2017 11:41:11 (CDT)  Doc ID   #[id number]  Job ID #[id number]    CC:

## 2017-08-23 NOTE — PLAN OF CARE
08/22/17 2039   Patient Assessment/Suction   Level of Consciousness (AVPU) alert   Respiratory Effort Normal;Unlabored   PRE-TX-O2-ETCO2   O2 Device (Oxygen Therapy) room air   SpO2 (!) 94 %   Pulse Oximetry Type Intermittent   $ Pulse Oximetry - Multiple Charge Pulse Oximetry - Multiple   Pulse 67   Resp 16   Pt assessed, no distress noted.

## 2017-08-23 NOTE — PT/OT/SLP PROGRESS
Occupational Therapy  Treatment / Discharge Note    Saman Crockett   MRN: [id number]   Admitting Diagnosis: femur fx with displacement s/p R hip arthroplasty     OT Date of Treatment: 08/23/17   Total Time (min): 55 min    Billable Minutes:  Self Care/Home Management 55  Total Minutes: 55    General Precautions: Standard, aspiration, fall, hearing impaired  Orthopedic Precautions: RLE weight bearing as tolerated, RLE anterior precautions  Braces: N/A    Do you have any cultural, spiritual, Scientology conflicts, given your current situation?: None    Subjective:  Communicated with nurse prior to session.    Pain/Comfort  Pain Rating 1: 0/10    Objective:       Functional Mobility:  Bed Mobility:   Supine to sit: Modified Independent     Transfer Training:   Sit to stand:Supervision or Set-up Assistance with Rolling Walker VC for safety awareness  Toilet Transfer:  Pt Stand Pivot with Modified Independent with Rolling Walker and bedside commode positioned over toilet  Patient tub bench transfer Stand Pivot with Stand-by Assistance with Rolling Walker.with verbal cues for correct sequence/technique  >>>> Patient ambulating EOB > toilet > tub bench > EOB > sink for approximately 40 ft with SBA and no LOB     Feeding:  Modified Independent after set up of breakfast tray    Grooming:  Patient washing hands, combing hair, and brushing teeth in standing with Modifed [place] x 4 mins with no LOB    Bathing:  Patient performed bathing with Supervision or Set-up Assistance with grab bar, Handheld shower head and tub bench at Shower.    UE Dressing:  Patient performed UE Dressing with Supervision or Set-up Assistance with no AE at tub bench.    LE Dressing:  Patient don/doffed socks with Supervision or Set-up Assistance using sock aid with Set up to don socks  Patient don/doffed boxer and shorts with Supervision or Set-up Assistance with RW in standing to manage clothing over hips     Toilet Training:  Pt performed toileting  with Modified Independent with bedside commode positioned over toilet and RW     Balance:   Static Sit: GOOD+: Takes MAXIMAL challenges from all directions.    Dynamic Sit:  GOOD: Maintains balance through MODERATE excursions of active trunk movement  Static Stand: GOOD: Takes MODERATE challenges from all directions  Dynamic stand: GOOD-: Needs SUPERVISION only during gait and able to self right with moderate     Additional Treatment:  - OTR reinforcing safety awareness and correct transfer sequence/technique with proper hand placement, safety awareness, adaptive techniques to increase independence & safety with self care tasks, and use of adaptive equipment. Patient will require daily reinforcement due to cognitive deficits     Patient left up in chair with all lines intact, call button in reach, chair alarm on and nurse notified    ASSESSMENT:  Saman Crockett is a 88 y.o. male with a medical diagnosis of femur fx with displacement s/p R hip arthroplasty and presents with satisfactory OT goal achievement. Patient remains at risk of fall due to cognitive deficits (kyung memory). Patient to be discharged home today 8-23-17 with spouse and home health OT services with bedside commode, RW, and wheelchair.     Rehab potential is good    Activity tolerance: Good    Discharge recommendations: home, home health OT     Equipment recommendations: bedside commode     GOALS:    Occupational Therapy Goals     Not on file          Multidisciplinary Problems (Resolved)        Problem: Occupational Therapy Goal    Goal Priority Disciplines Outcome Interventions   Occupational Therapy Goal   (Resolved)     OT, PT/OT Outcome(s) achieved    Description:  Goals to be met by: 8/24/17     Patient will increase functional independence with ADLs by performing:    Feeding with [place].  UE Dressing with Supervision.  LE Dressing with Minimal Assistance with DME/AE as needed.  Grooming while seated with [place].  Toileting from  toilet with Moderate Assistance for hygiene and clothing management.   Bathing from shower chair/bench with Minimal Assistance.  Shower transfer with Supervision.  Toilet transfer to toilet with Modified [place].                        Plan:  Patient to be seen 6 x/week to address the above listed problems via self-care/home management, community/work re-entry, therapeutic activities, therapeutic exercises, therapeutic groups, neuromuscular re-education, cognitive retraining, wheelchair management/training  Plan of Care expires: 08/24/17  Plan of Care reviewed with: patient    Chica CHEL May, BRAYDEN  08/23/2017

## 2017-08-23 NOTE — DISCHARGE INSTRUCTIONS
Referral made to [facility] for PT, OT, nurse and aide to follow at home.  Their phone # is [phone].    Wheelchair, rolling walker ordered from [employer] and bedside commode ordered from [facility] for home use.  Their phone #s are: [employer] = [phone]; [facility] = [phone].  (The wheelchair is a rental so when you no longer need it, call [employer] and they will pick it up.)    You will need to make follow-up appts with Dr. Gaitan and your PCP within the next 2 weeks.      CHECK BLOOD SUGAR DAILY AND KEEP A LOG. IF BLOOD SUGAR LESS THAN 110 NOTIFY MD FOR INSTRUCTION ON DOSEAGE    LOVENOX INJECTION DAILY.    ALLOW STERI STRIPS TO FALL OFF AT THEIR OWN PACE.        NO TUB BATHS OR HOT TUBS OR SWIMMING POOLS UNTIL RELEASED BY DOCTOR.    MAY SHOWER WASH WITH SOAP AND WATER, PAT DRY .    REPORT ANY DRAINAGE OR FEVER OR REDNESS  FROM INCISION TO   IMMEDIATELY    FALL PREVENTION AND SAFETY.

## 2017-08-23 NOTE — PLAN OF CARE
Problem: Occupational Therapy Goal  Goal: Occupational Therapy Goal  Goals to be met by: 8/24/17     Patient will increase functional independence with ADLs by performing:    Feeding with [place].  UE Dressing with Supervision.  LE Dressing with Minimal Assistance with DME/AE as needed.  Grooming while seated with [place].  Toileting from toilet with Moderate Assistance for hygiene and clothing management.   Bathing from shower chair/bench with Minimal Assistance.  Shower transfer with Supervision.  Toilet transfer to toilet with Modified [place].       Outcome: Outcome(s) achieved Date Met: 08/23/17  Satisfactory OT goal achievement

## 2017-08-23 NOTE — PT/OT/SLP PROGRESS
Speech Language Pathology Treatment          Saman Crockett   MRN: [id number]     Diet recommendations: Solid Diet Level: Regular  Liquid Diet Level: Nectar Thick   · No straws   · Small bites/sips   · Alternating bites/sips   · Meds whole buried in puree   · Eliminate distractions   · Assistance with thickening liquids  · Given Pt noncompliance w/ thickened water, recommend initiation and reinforcement of Lant[place] Water Protocol  ? unthickend water allowed ONLY between meals (at least 30 minutes after PO intake)   SLP Treatment Date: 08/22/17  Speech Start Time: 0830     Speech Stop Time: 0900     Speech Total (min): 30 min       TREATMENT BILLABLE MINUTES:  Speech Therapy Individual 15 and Treatment Swallowing Dysfunction 15    General Precautions: Standard, fall, nectar thick, hearing impaired        Subjective:  Pt agitated; max encouragement required for Pt to participate in therapy     Objective:   1. Pt will tolerate nectar thick liquids with no overt s/s of aspiration or changes in vocal quality  2. Pt and family will demonstrate compliance with lantigua water protocol   3. Pt will use external memory aids and compensatory strategies to recall routine, personal information, and recent events to improve orientation to time & recall daily events with 80% acc and min verbal and/or visual cues.    4. Pt will demonstrate safety awareness to immediate environment with 80% acc given min assistance      Assessment:  Saman Crockett is a 88 y.o. male with a SLP diagnosis of severe short term memory deficits and pharyngeal dysphagia. Pt seen w/ breakfast tray at EOB. Pt with dry, productive (able to expel material x1) 3x across soft solid x3 and 7 oz of NTL. Given no evidence of wet vocal quality, Pt appears to be tolerating current diet textures. Pt demonstrated impulsivity and limited safety awareness, expressing interest in transferring from bed to bedside chair independently. Despite max verbal cueing, Pt unable to  identify hazards, fall precautions, solutions and potential consequences. Ultimately, Pt assisted in transfer from bed to wheelchair w/ safety belt/alarm. Orientation attempted however, Pt immediately agitated when prompted to utilize external aids. Task discontinued.     Diet recommendations:        Liquid Diet Level: Nectar Thick  Solid: Regular     Discharge recommendations: Discharge Facility/Level Of Care Needs: home     Goals:    SLP Goals        Problem: SLP Goal    Goal Priority Disciplines Outcome   SLP Goal     SLP Ongoing (interventions implemented as appropriate)   Description:  Long term goals:  1. Pt will use appropriate memory strategies to schedule and recall weekly activities, express needs and recall names to maintain safety and participate socially in functional living environment.     Short term goals:  1. Pt will demonstrate use of memory strategies with 80% acc given verbal and/or visual cues with 80% acc following review.  2. Pt will recall a 3 part functional message related to ADL's with min verbal and visual assistance immediately after review with 80% acc.  3. Pt will recall daily activities via retelling/answering questions with 80% acc given min verbal and visual assistance   4. Pt will use external memory aids and compensatory strategies to recall routine, personal information, and recent events to improve orientation to time & recall daily events with 80% acc and min verbal and/or visual cues.   5. Pt will participate in ongoing assessment of reasoning, problem solving and safety awareness skills                      Plan:   Patient to be seen Therapy Frequency: 5 x/week   Plan of Care Expires: 08/23/17  Plan of Care reviewed with: patient, family  SLP Follow-up?: Yes  SLP - Next Visit Date: 08/22/17           ST Berna 8/23/2017

## 2017-08-23 NOTE — NURSING
Lovenox injection explained to patient's son and then demonstrated by the son. Insulin injection also taught. Discharge orders reviewed with patient and son with a verbal answer of understanding. Patient left with son in a car to go to a house in [place].

## 2017-08-24 ENCOUNTER — TELEPHONE (OUTPATIENT)
Dept: FAMILY MEDICINE | Facility: CLINIC | Age: 82
End: 2017-08-24

## 2017-08-24 DIAGNOSIS — E11.42 TYPE 2 DIABETES MELLITUS WITH DIABETIC POLYNEUROPATHY, WITHOUT LONG-TERM CURRENT USE OF INSULIN: Primary | ICD-10-CM

## 2017-08-24 RX ORDER — FERROUS SULFATE 325(65) MG
TABLET ORAL
Refills: 4 | COMMUNITY
Start: 2017-07-27 | End: 2017-10-02 | Stop reason: ALTCHOICE

## 2017-08-24 RX ORDER — INSULIN PUMP SYRINGE, 3 ML
EACH MISCELLANEOUS
Qty: 1 EACH | Refills: 0 | Status: SHIPPED | OUTPATIENT
Start: 2017-08-24 | End: 2017-08-25 | Stop reason: SDUPTHER

## 2017-08-24 RX ORDER — LANCETS 28 GAUGE
1 EACH MISCELLANEOUS DAILY
Qty: 100 EACH | Refills: 3 | Status: SHIPPED | OUTPATIENT
Start: 2017-08-24 | End: 2017-08-25 | Stop reason: SDUPTHER

## 2017-08-25 ENCOUNTER — TELEPHONE (OUTPATIENT)
Dept: FAMILY MEDICINE | Facility: CLINIC | Age: 82
End: 2017-08-25

## 2017-08-25 DIAGNOSIS — E11.9 DIABETES MELLITUS WITHOUT COMPLICATION: Primary | ICD-10-CM

## 2017-08-25 RX ORDER — LEVOTHYROXINE SODIUM 150 UG/1
TABLET ORAL
Qty: 30 TABLET | Refills: 11 | Status: SHIPPED | OUTPATIENT
Start: 2017-08-25 | End: 2018-09-17 | Stop reason: SDUPTHER

## 2017-08-25 RX ORDER — LANCETS 28 GAUGE
1 EACH MISCELLANEOUS DAILY
Qty: 100 EACH | Refills: 3 | Status: SHIPPED | OUTPATIENT
Start: 2017-08-25 | End: 2017-08-30 | Stop reason: ALTCHOICE

## 2017-08-25 RX ORDER — INSULIN PUMP SYRINGE, 3 ML
EACH MISCELLANEOUS
Qty: 1 EACH | Refills: 0 | Status: SHIPPED | OUTPATIENT
Start: 2017-08-25 | End: 2018-07-05

## 2017-08-25 NOTE — PATIENT INSTRUCTIONS
Total Hip Replacement  Total hip replacement surgery almost always reduces joint pain. During this surgery, your problem hip joint is replaced with an artificial joint, called a prosthesis.  Benefits of hip replacement  Total hip replacement surgery almost always:  · Stops or greatly reduces hip pain. Even the pain from surgery should go away within weeks.  · Increases leg function. Without hip pain, youll be able to use your legs more. This will build up your muscles.  · Improves quality of life by allowing you to do daily tasks and low-impact activities in greater comfort.  · Provides years of easier movement. Most total hip replacements last for many years.  Your surgical experience  You will most likely arrive at the hospital on the morning of surgery. In many cases, pre-op tests are done days or even weeks ahead of time. Follow all of your surgeons instructions on preparing for surgery. When you arrive, youll be given forms to fill out. You will also talk with the anesthesiologist,  the doctor who gives the anesthesia, if you havent done so already.  Preparing for surgery  You will be told when to stop eating and drinking before surgery. If you take daily medicines, especially blood thinners, ask your doctor if you should still take them the morning of surgery. You may need to stop certain medicines several days or weeks before the surgery. At the hospital your temperature, pulse, breathing, and blood pressure will be checked. An IV (intravenous) line will be started to provide fluids and medicines needed during surgery.    The surgical procedure  When the surgical team is ready, youll be taken to the operating room. There youll be given anesthesia. The anesthesia will help you sleep through surgery, or it will make you numb from the waist down. Then an incision is made, giving the surgeon access to your hip joint. The damaged ball is removed, and the socket is prepared to hold the prosthesis. After the  new joint is in place, the incision is closed with staples or stitches.  Preparing the bone  The hip is a ball-and-socket joint. The ball is cut from the thighbone, and the surface of the old socket is smoothed. Then the new socket is put into the pelvis. The socket is usually press-fit and may be held in place with screws. A press-fit prosthesis has tiny pores on its surface that your bone will grow into. Cement or press-fit may be used to hold the ball-and-stem portion of the hip replacement.    Joining the new parts  The new hip stem is inserted into the upper portion of your thighbone. After the stem is secure in the thighbone, the new ball and socket are joined. The stem of the prosthesis may be held with cement or press-fit. Your surgeon will choose the method that is best for you.  In the recovery room  After surgery youll be sent to the recovery room, also called the PACU (postanesthesia care unit). Your condition will be watched closely, and youll be given pain medications. You may have a catheter (small tube) in your bladder and a drain in your hip. To keep your new joint stable, a foam wedge or pillows may be placed between your legs. In some cases, a brace is used.  Risks and complications  As with any surgery, hip replacement has possible risks and complications. These include the following:  · Reaction to the anesthesia  · Blood clots  · Infection  · Dislocation of the joint or loosening of the prosthesis  · Fracture  · Wearing out the prosthetic  · Damage to nearby blood vessels, bones, or nerves  · Thigh pain   Date Last Reviewed: 8/28/2015 © 2000-2016 [employer]. [address] [place] [id number]. All rights reserved. This information is not intended as a substitute for professional medical care. Always follow your healthcare professional's instructions.

## 2017-08-25 NOTE — TELEPHONE ENCOUNTER
Daughter called regarding patient   She would like to speak with you about blood sugars and Insulin  She checked bloodsugar with  strips   11pm 2017 180  7 am  2017 111  Waiting to get new glucometer from pharmacy please call and speak with daughter regarding patient  Patient's daughter talked with me at length about his difficulties getting a glucose meter and strips which apparently seems to be getting straightened out he also has had a new prescription for Levemir flex pan which is being approved by prior off and he had one blood sugar of 180 other when seem to be better controlled I advised her to Be consistent with his dosing and scheduling of his insulin 18 units at bedtime as scribed and come in on Wednesday with his log and we will review blood sugars in the control.

## 2017-08-25 NOTE — TELEPHONE ENCOUNTER
Jeffrey with [facility] called and they need diagnosis codes on rx for glucometer for insurance please send rx again

## 2017-08-28 ENCOUNTER — TELEPHONE (OUTPATIENT)
Dept: FAMILY MEDICINE | Facility: CLINIC | Age: 82
End: 2017-08-28

## 2017-08-28 RX ORDER — SIMVASTATIN 40 MG/1
40 TABLET, FILM COATED ORAL DAILY
Refills: 2 | COMMUNITY
Start: 2017-08-23 | End: 2017-08-30 | Stop reason: ALTCHOICE

## 2017-08-28 NOTE — TELEPHONE ENCOUNTER
Bailey with Home Health called re patient   Phone [phone]  List of patients blood sugar  08/26/2017 am 86  08/26/2017 pm  200    08/27/2017 am 121  08/27/2017 pm 132    08/28/2017 am  102  These levels are without the Levimir  Patient taking Metformin 750 1 bid  Glyburide 5mg 1 once a day   Please call in regards to discontinuing the Levimir

## 2017-08-29 NOTE — TELEPHONE ENCOUNTER
Spoke with Bailey with home health advised to d/c virginiaimir continue other medications as directed

## 2017-08-30 ENCOUNTER — OFFICE VISIT (OUTPATIENT)
Dept: FAMILY MEDICINE | Facility: CLINIC | Age: 82
End: 2017-08-30
Payer: MEDICARE

## 2017-08-30 VITALS
RESPIRATION RATE: 16 BRPM | SYSTOLIC BLOOD PRESSURE: 130 MMHG | BODY MASS INDEX: 23.73 KG/M2 | DIASTOLIC BLOOD PRESSURE: 60 MMHG | HEART RATE: 64 BPM | WEIGHT: 175 LBS | TEMPERATURE: 98 F

## 2017-08-30 DIAGNOSIS — Z96.641 HISTORY OF RIGHT HIP REPLACEMENT: ICD-10-CM

## 2017-08-30 DIAGNOSIS — L89.152 DECUBITUS ULCER OF SACRAL REGION, STAGE 2: ICD-10-CM

## 2017-08-30 PROCEDURE — 1111F DSCHRG MED/CURRENT MED MERGE: CPT | Mod: ,,, | Performed by: FAMILY MEDICINE

## 2017-08-30 PROCEDURE — 1159F MED LIST DOCD IN RCRD: CPT | Mod: ,,, | Performed by: FAMILY MEDICINE

## 2017-08-30 PROCEDURE — 99213 OFFICE O/P EST LOW 20 MIN: CPT | Mod: ,,, | Performed by: FAMILY MEDICINE

## 2017-08-30 RX ORDER — CYPROHEPTADINE HYDROCHLORIDE 4 MG/1
4 TABLET ORAL NIGHTLY
Qty: 30 TABLET | Refills: 11 | Status: SHIPPED | OUTPATIENT
Start: 2017-08-30 | End: 2017-12-21

## 2017-08-30 RX ORDER — GLIPIZIDE 5 MG/1
TABLET ORAL
COMMUNITY
Start: 2017-06-13 | End: 2018-02-20 | Stop reason: SDUPTHER

## 2017-08-30 NOTE — PROGRESS NOTES
Subjective:       Patient ID: Saman Crockett is a 88 y.o. male.    Chief Complaint: Hospital Follow Up    HTN is here today for follow-up from the hospital after a fractured hip after a fall and had total hip replacement August 5 in 2 weeks rehabilitation and is back home.      Wound Check   Treated in ED: bedsore on sacrum x 10-14dd. Prior ED Treatment: boudreauxs buttpaste. His temperature was unmeasured prior to arrival. There has been no drainage from the wound. The redness has worsened. The swelling has not changed. The pain has worsened.     Review of Systems   Constitutional: Positive for activity change (pain on sitting.).        Poor appitite not eating       Objective:      Physical Exam   Cardiovascular: Normal rate, regular rhythm, normal heart sounds and intact distal pulses.    Pulmonary/Chest: Effort normal and breath sounds normal.   Musculoskeletal:        Legs:  Neurological: He is alert. He has normal reflexes.       Assessment:       1. History of right hip replacement    2. Decubitus ulcer of sacral region, stage 2        Plan:       Saman was seen today for hospital follow up.    Diagnoses and all orders for this visit:    History of right hip replacement  -     cyproheptadine (PERIACTIN) 4 mg tablet; Take 1 tablet (4 mg total) by mouth every evening.    Decubitus ulcer of sacral region, stage 2  -     cyproheptadine (PERIACTIN) 4 mg tablet; Take 1 tablet (4 mg total) by mouth every evening.  -     CBC auto differential; Future  -     Prealbumin; Future  -     CBC auto differential  -     Prealbumin         Return in about 1 month (around 9/30/2017).

## 2017-09-01 LAB
BASOPHILS # BLD AUTO: 45 CELLS/UL (ref 0–200)
BASOPHILS NFR BLD AUTO: 0.4 %
EOSINOPHIL # BLD AUTO: 362 CELLS/UL (ref 15–500)
EOSINOPHIL NFR BLD AUTO: 3.2 %
ERYTHROCYTE [DISTWIDTH] IN BLOOD BY AUTOMATED COUNT: 12.5 % (ref 11–15)
HCT VFR BLD AUTO: 34.6 % (ref 38.5–50)
HGB BLD-MCNC: 11.5 G/DL (ref 13.2–17.1)
LYMPHOCYTES # BLD AUTO: 4384 CELLS/UL (ref 850–3900)
LYMPHOCYTES NFR BLD AUTO: 38.8 %
MCH RBC QN AUTO: 30.6 PG (ref 27–33)
MCHC RBC AUTO-ENTMCNC: 33.2 G/DL (ref 32–36)
MCV RBC AUTO: 92 FL (ref 80–100)
MONOCYTES # BLD AUTO: 689 CELLS/UL (ref 200–950)
MONOCYTES NFR BLD AUTO: 6.1 %
NEUTROPHILS # BLD AUTO: 5820 CELLS/UL (ref 1500–7800)
NEUTROPHILS NFR BLD AUTO: 51.5 %
PLATELET # BLD AUTO: 432 THOUSAND/UL (ref 140–400)
PMV BLD REES-ECKER: 9.1 FL (ref 7.5–12.5)
PREALB SERPL-MCNC: 24 MG/DL (ref 21–43)
RBC # BLD AUTO: 3.76 MILLION/UL (ref 4.2–5.8)
WBC # BLD AUTO: 11.3 THOUSAND/UL (ref 3.8–10.8)

## 2017-09-08 ENCOUNTER — OFFICE VISIT (OUTPATIENT)
Dept: ORTHOPEDICS | Facility: CLINIC | Age: 82
End: 2017-09-08
Payer: MEDICARE

## 2017-09-08 VITALS
WEIGHT: 175 LBS | DIASTOLIC BLOOD PRESSURE: 58 MMHG | HEIGHT: 72 IN | BODY MASS INDEX: 23.7 KG/M2 | HEART RATE: 70 BPM | SYSTOLIC BLOOD PRESSURE: 118 MMHG

## 2017-09-08 DIAGNOSIS — Z96.641 S/P TOTAL HIP ARTHROPLASTY, RIGHT: Primary | ICD-10-CM

## 2017-09-08 PROCEDURE — 99024 POSTOP FOLLOW-UP VISIT: CPT | Mod: ,,, | Performed by: ORTHOPAEDIC SURGERY

## 2017-09-08 NOTE — PROGRESS NOTES
Subjective:       Chief Complaint    Chief Complaint   Patient presents with    Post-op Evaluation     NP p/o 4 weeks & 4 days, right hip.  DOS: 8/7/17, total hip arthroplasty right hip w/ anterior approach w/ hana table.  Patient states some pain but not much.  Home P.T. 3 times a week plus home health visits.  Using walker.  Home health nurse sent a note stating he has had increased groin pain w/ supine side leg raises & abduction & also w/ bending to put shoes on.  Patient has had decreased appetite since surgery.         HPI  Saman Crockett is a 88 y.o.  male who presents Right total hip replacement for displaced femoral neck fracture. Doing very well. Uses a walker but is having a hard time keeping up with it.      Past History  Past Medical History:   Diagnosis Date    Diabetes mellitus     Elevated PSA     Hyperlipidemia     Kidney stone     Thyroid disease      Past Surgical History:   Procedure Laterality Date    LITHOTRIPSY      tonisilectomy      TOTAL HIP ARTHROPLASTY Right 08/07/2017     Social History     Social History    Marital status:      Spouse name: N/A    Number of children: N/A    Years of education: N/A     Occupational History    Not on file.     Social History Main Topics    Smoking status: Former Smoker     Types: Cigarettes     Quit date: 8/14/1973    Smokeless tobacco: Never Used    Alcohol use No    Drug use: No    Sexual activity: Not on file     Other Topics Concern    Not on file     Social History Narrative    No narrative on file         Medications  Current Outpatient Prescriptions   Medication Sig    aspirin 325 MG tablet Take 1 tablet (325 mg total) by mouth 2 (two) times daily.    bisacodyl (DULCOLAX) 5 mg EC tablet Take 2 tablets (10 mg total) by mouth daily as needed for Constipation.    blood sugar diagnostic Strp Test blood sugar once a day    blood-glucose meter kit Test blood sugar once a day    cyproheptadine (PERIACTIN) 4 mg tablet Take  1 tablet (4 mg total) by mouth every evening.    enalapril (VASOTEC) 2.5 MG tablet Take 1 tablet (2.5 mg total) by mouth once daily.    escitalopram oxalate (LEXAPRO) 10 MG tablet Take 1 tablet (10 mg total) by mouth once daily.    ferrous gluconate (FERGON) 324 MG tablet Take 1 tablet (324 mg total) by mouth daily with breakfast.    ferrous sulfate 325 mg (65 mg iron) Tab tablet TAKE 1 TABLET BY MOUTH ONCE A DAY WITH BREAKFAST    gemfibrozil (LOPID) 600 MG tablet Take 1 tablet (600 mg total) by mouth 2 (two) times daily before meals.    glipiZIDE (GLUCOTROL) 5 MG tablet     levothyroxine (SYNTHROID) 150 MCG tablet TAKE ONE TABLET BY MOUTH daily    memantine 28 mg CSpX Take 1 capsule (28 mg total) by mouth once daily.    metformin (GLUCOPHAGE-XR) 750 MG 24 hr tablet Take 1 tablet (750 mg total) by mouth 2 (two) times daily with meals.    multivitamin (THERAGRAN) tablet Take 1 tablet by mouth once daily.    tamsulosin (FLOMAX) 0.4 mg Cp24 Take 1 capsule (0.4 mg total) by mouth once daily.     No current facility-administered medications for this visit.        Allergies  Review of patient's allergies indicates:   Allergen Reactions    Levaquin [levofloxacin] Nausea Only         Review of Systems     Constitutional: Negative    HENT: Negative  Eyes: Negative  Respiratory: Negative  Cardiovascular: Negative  Musculoskeletal: HPI  Skin: Negative  Neurological: Negative  Hematological: Negative  Endocrine: Negative      Physical Exam    Vitals:    09/08/17 1041   BP: (!) 118/58   Pulse: 70     Physical Examination:Right hip incision is well-healed. Abduction 35° flexion past 90°, external rotation 30, internal rotation 20.     Skin-  General appearance -  well appearing, and in no distress  Mental status - awake  Neck - supple  Chest -  symmetric air entry  Heart - normal rate   Abdomen - soft      Assessment/Plan   S/P total hip arthroplasty, right      She is doing very well. We have to keep him in the  walker cozy self all without it. Follow-up in a month      This note was dictated using voice recognition software and may contain grammatical errors.

## 2017-09-27 ENCOUNTER — TELEPHONE (OUTPATIENT)
Dept: FAMILY MEDICINE | Facility: CLINIC | Age: 82
End: 2017-09-27

## 2017-09-27 NOTE — TELEPHONE ENCOUNTER
Discuss case with Bailey with [facility] home health: DR Singer changed his Periactin 2 AM and set a bedtime but this would probably decrease his daytime drowsiness and could be contributing to appetite loss we will resume the Periactin at bedtime reduce the dose of Namenda to half of what he was taking or DC it altogether start ginkgo bilobaand OTC recommended doses. We'll get weekly weights and a CMP in 3 albumin on next blood draw.

## 2017-09-27 NOTE — TELEPHONE ENCOUNTER
Bailey [phone] with [facility] called regarding Mr. Crockett  Daughter concerned patient is down 15 lbs in a few months, no appetite.  Dr Rea changed Periactin to the am instead of at night but it is not helping appetite and patient is sleepy.  Also concerned that the Namenda may be causing the loss of appetite.  Please call and discuss with Bailey

## 2017-10-02 ENCOUNTER — OFFICE VISIT (OUTPATIENT)
Dept: FAMILY MEDICINE | Facility: CLINIC | Age: 82
End: 2017-10-02
Payer: MEDICARE

## 2017-10-02 VITALS
SYSTOLIC BLOOD PRESSURE: 126 MMHG | BODY MASS INDEX: 24.01 KG/M2 | WEIGHT: 177 LBS | TEMPERATURE: 98 F | RESPIRATION RATE: 20 BRPM | HEART RATE: 80 BPM | DIASTOLIC BLOOD PRESSURE: 78 MMHG

## 2017-10-02 DIAGNOSIS — R63.0 ANOREXIA: ICD-10-CM

## 2017-10-02 PROCEDURE — G0008 ADMIN INFLUENZA VIRUS VAC: HCPCS | Mod: ,,, | Performed by: FAMILY MEDICINE

## 2017-10-02 PROCEDURE — 90662 IIV NO PRSV INCREASED AG IM: CPT | Mod: ,,, | Performed by: FAMILY MEDICINE

## 2017-10-02 PROCEDURE — 90670 PCV13 VACCINE IM: CPT | Mod: ,,, | Performed by: FAMILY MEDICINE

## 2017-10-02 PROCEDURE — G0009 ADMIN PNEUMOCOCCAL VACCINE: HCPCS | Mod: ,,, | Performed by: FAMILY MEDICINE

## 2017-10-02 PROCEDURE — 99213 OFFICE O/P EST LOW 20 MIN: CPT | Mod: 25,,, | Performed by: FAMILY MEDICINE

## 2017-10-02 RX ORDER — MEMANTINE HYDROCHLORIDE 10 MG/1
10 TABLET ORAL DAILY
Qty: 30 TABLET | Refills: 11 | Status: SHIPPED | OUTPATIENT
Start: 2017-10-02 | End: 2017-10-11 | Stop reason: DRUGHIGH

## 2017-10-02 RX ORDER — CLOPIDOGREL BISULFATE 75 MG/1
75 TABLET ORAL DAILY
Qty: 30 TABLET | Refills: 11 | Status: SHIPPED | OUTPATIENT
Start: 2017-10-02 | End: 2017-12-21

## 2017-10-02 RX ORDER — ASPIRIN 81 MG/1
81 TABLET ORAL DAILY
COMMUNITY

## 2017-10-02 RX ORDER — MEGESTROL ACETATE 125 MG/ML
625 SUSPENSION ORAL DAILY
Qty: 150 ML | Refills: 11 | Status: SHIPPED | OUTPATIENT
Start: 2017-10-02 | End: 2018-10-02

## 2017-10-02 NOTE — PROGRESS NOTES
Subjective:       Patient ID: Saman Crockett is a 88 y.o. male.    Chief Complaint: Medication Problem (discuss medication. )    Patient presents today for follow-up he has not had an increase in appetite with the Periactin despite switching it to nighttime only he only eats T equivalent of a peanut butter sandwich and a ham sandwich and whatever candy is available per day. Will not tolerate sufficient dietary supplements i.e. ensure etc. more than twice a week. Has no personal history of cancer or blood clots has had ischemic strokes in the past.  Wt Readings from Last 1 Encounters:  10/02/17 1326 : 80.3 kg (177 lb)            Review of Systems    Objective:      Physical Exam   Constitutional: He appears well-developed and well-nourished.   Cardiovascular: Normal rate, regular rhythm, normal heart sounds and intact distal pulses.  Exam reveals no gallop and no friction rub.    No murmur heard.  Pulmonary/Chest: Effort normal and breath sounds normal. No respiratory distress. He has no wheezes. He has no rales.   Skin: Skin is warm and dry.   Psychiatric: He has a normal mood and affect. His behavior is normal. Judgment and thought content normal.   Nursing note and vitals reviewed.      Assessment:       1. Anorexia        Plan:       Saman was seen today for medication problem.    Diagnoses and all orders for this visit:    Anorexia  -     clopidogrel (PLAVIX) 75 mg tablet; Take 1 tablet (75 mg total) by mouth once daily.  -     megestrol (MEGACE ES) 625 mg/5 mL Susp; Take 5 mLs (625 mg total) by mouth once daily.    Other orders  -     memantine (NAMENDA) 10 MG Tab; Take 1 tablet (10 mg total) by mouth once daily.         No Follow-up on file.

## 2017-10-03 LAB
ALBUMIN SERPL-MCNC: 4 G/DL (ref 3.1–4.7)
ALP SERPL-CCNC: 157 IU/L (ref 40–104)
ALT (SGPT): 12 IU/L (ref 3–33)
AST SERPL-CCNC: 20 IU/L (ref 10–40)
BILIRUB SERPL-MCNC: 0.8 MG/DL (ref 0.3–1)
BUN SERPL-MCNC: 40 MG/DL (ref 8–20)
CALCIUM SERPL-MCNC: 9.3 MG/DL (ref 7.7–10.4)
CHLORIDE: 105 MMOL/L (ref 98–110)
CO2 SERPL-SCNC: 21.3 MMOL/L (ref 22.8–31.6)
CREATININE: 1.54 MG/DL (ref 0.6–1.4)
GLUCOSE: 177 MG/DL (ref 70–99)
POTASSIUM SERPL-SCNC: 4.8 MMOL/L (ref 3.5–5)
PROT SERPL-MCNC: 7.6 G/DL (ref 6–8.2)
SODIUM: 137 MMOL/L (ref 134–144)

## 2017-10-10 RX ORDER — GEMFIBROZIL 600 MG/1
TABLET, FILM COATED ORAL
Qty: 180 TABLET | Refills: 3 | Status: SHIPPED | OUTPATIENT
Start: 2017-10-10 | End: 2018-01-10 | Stop reason: ALTCHOICE

## 2017-10-11 ENCOUNTER — OFFICE VISIT (OUTPATIENT)
Dept: ORTHOPEDICS | Facility: CLINIC | Age: 82
End: 2017-10-11
Payer: MEDICARE

## 2017-10-11 VITALS
HEIGHT: 72 IN | HEART RATE: 69 BPM | SYSTOLIC BLOOD PRESSURE: 118 MMHG | BODY MASS INDEX: 23.98 KG/M2 | DIASTOLIC BLOOD PRESSURE: 62 MMHG | WEIGHT: 177 LBS

## 2017-10-11 DIAGNOSIS — Z96.641 HX OF TOTAL HIP ARTHROPLASTY, RIGHT: Primary | ICD-10-CM

## 2017-10-11 DIAGNOSIS — Z98.890 POST-OPERATIVE STATE: ICD-10-CM

## 2017-10-11 PROCEDURE — 73502 X-RAY EXAM HIP UNI 2-3 VIEWS: CPT | Mod: RT,,, | Performed by: ORTHOPAEDIC SURGERY

## 2017-10-11 PROCEDURE — 99024 POSTOP FOLLOW-UP VISIT: CPT | Mod: ,,, | Performed by: ORTHOPAEDIC SURGERY

## 2017-10-11 RX ORDER — MEMANTINE HYDROCHLORIDE 28 MG/1
1 CAPSULE, EXTENDED RELEASE ORAL DAILY
Refills: 11 | COMMUNITY
Start: 2017-09-18 | End: 2017-10-11

## 2017-10-11 NOTE — PROGRESS NOTES
Subjective:       Chief Complaint    Chief Complaint   Patient presents with    Post-op Evaluation     9 weeks & 2 days, right hip.  DOS: 8/7/17, total hip arthroplasty right hip w/ anterior approach w/ hana table.  Patient states he has pain every once in a while but overall doing well.  Still doing home P.T. 2 times a week.         HPI  Saman Crockett is a 88 y.o.  male who presents Follow-up right total hip arthroplasty, overall doing well.      Past History  Past Medical History:   Diagnosis Date    Diabetes mellitus     Elevated PSA     Hyperlipidemia     Kidney stone     Memory difficulties     Thyroid disease      Past Surgical History:   Procedure Laterality Date    LITHOTRIPSY      tonisilectomy      TOTAL HIP ARTHROPLASTY Right 08/07/2017     Social History     Social History    Marital status:      Spouse name: N/A    Number of children: N/A    Years of education: N/A     Occupational History    Not on file.     Social History Main Topics    Smoking status: Former Smoker     Types: Cigarettes     Quit date: 8/14/1973    Smokeless tobacco: Never Used    Alcohol use No    Drug use: No    Sexual activity: Not on file     Other Topics Concern    Not on file     Social History Narrative    No narrative on file         Medications  Current Outpatient Prescriptions   Medication Sig    aspirin (ECOTRIN) 81 MG EC tablet Take 81 mg by mouth once daily.    blood sugar diagnostic Strp Test blood sugar once a day    blood-glucose meter kit Test blood sugar once a day    clopidogrel (PLAVIX) 75 mg tablet Take 1 tablet (75 mg total) by mouth once daily.    cyproheptadine (PERIACTIN) 4 mg tablet Take 1 tablet (4 mg total) by mouth every evening.    enalapril (VASOTEC) 2.5 MG tablet Take 1 tablet (2.5 mg total) by mouth once daily.    escitalopram oxalate (LEXAPRO) 10 MG tablet Take 1 tablet (10 mg total) by mouth once daily.    ferrous gluconate (FERGON) 324 MG tablet Take 1 tablet  (324 mg total) by mouth daily with breakfast.    gemfibrozil (LOPID) 600 MG tablet TAKE 1 TABLET BY MOUTH  TWICE A DAY    glipiZIDE (GLUCOTROL) 5 MG tablet     levothyroxine (SYNTHROID) 150 MCG tablet TAKE ONE TABLET BY MOUTH daily    megestrol (MEGACE ES) 625 mg/5 mL Susp Take 5 mLs (625 mg total) by mouth once daily.    metformin (GLUCOPHAGE-XR) 750 MG 24 hr tablet Take 1 tablet (750 mg total) by mouth 2 (two) times daily with meals.    multivitamin (THERAGRAN) tablet Take 1 tablet by mouth once daily.    tamsulosin (FLOMAX) 0.4 mg Cp24 Take 1 capsule (0.4 mg total) by mouth once daily.     No current facility-administered medications for this visit.        Allergies  Review of patient's allergies indicates:   Allergen Reactions    Levaquin [levofloxacin] Nausea Only         Review of Systems     Constitutional: Negative    HENT: Negative  Eyes: Negative  Respiratory: Negative  Cardiovascular: Negative  Musculoskeletal: HPI  Skin: Negative  Neurological: Negative  Hematological: Negative  Endocrine: Negative      Physical Exam    Vitals:    10/11/17 1253   BP: 118/62   Pulse: 69     Physical Examination:Right hip examination reveals no tenderness over the anterior hip. Abduction, 40°. Flexion 100°. External rotation 45°. Internal rotation 20°. All movement is painless.     Skin-clear  General appearance -  well appearing, and in no distress  Mental status - awake  Neck - supple  Chest -  symmetric air entry  Heart - normal rate   Abdomen - soft      Assessment/Plan   Hx of total hip arthroplasty, right    Post-operative state    Views of the right hip show a well-positioned total hip arthroplasty. A transition to a cane. Continue with home health PT discontinue the nursing aspect.        This note was dictated using voice recognition software and may contain grammatical errors.

## 2017-11-02 RX ORDER — METFORMIN HYDROCHLORIDE 750 MG/1
TABLET, EXTENDED RELEASE ORAL
Qty: 60 TABLET | Refills: 11 | Status: SHIPPED | OUTPATIENT
Start: 2017-11-02 | End: 2018-11-06 | Stop reason: SDUPTHER

## 2017-12-21 ENCOUNTER — OFFICE VISIT (OUTPATIENT)
Dept: FAMILY MEDICINE | Facility: CLINIC | Age: 82
End: 2017-12-21
Payer: MEDICARE

## 2017-12-21 VITALS
HEIGHT: 72 IN | HEART RATE: 68 BPM | BODY MASS INDEX: 23.84 KG/M2 | RESPIRATION RATE: 16 BRPM | SYSTOLIC BLOOD PRESSURE: 130 MMHG | TEMPERATURE: 99 F | WEIGHT: 176 LBS | DIASTOLIC BLOOD PRESSURE: 70 MMHG

## 2017-12-21 DIAGNOSIS — F02.818 LATE ONSET ALZHEIMER'S DISEASE WITH BEHAVIORAL DISTURBANCE: ICD-10-CM

## 2017-12-21 DIAGNOSIS — E78.2 MIXED HYPERLIPIDEMIA: ICD-10-CM

## 2017-12-21 DIAGNOSIS — G30.1 LATE ONSET ALZHEIMER'S DISEASE WITH BEHAVIORAL DISTURBANCE: ICD-10-CM

## 2017-12-21 DIAGNOSIS — F05 SUNDOWN SYNDROME: ICD-10-CM

## 2017-12-21 DIAGNOSIS — R41.3 MEMORY DIFFICULTIES: ICD-10-CM

## 2017-12-21 DIAGNOSIS — E03.9 ACQUIRED HYPOTHYROIDISM: Primary | ICD-10-CM

## 2017-12-21 PROBLEM — E07.9 THYROID DISEASE: Status: ACTIVE | Noted: 2017-05-31

## 2017-12-21 PROBLEM — E78.5 HYPERLIPIDEMIA: Status: ACTIVE | Noted: 2017-12-21

## 2017-12-21 PROBLEM — E11.9 DIABETES MELLITUS: Status: ACTIVE | Noted: 2017-05-31

## 2017-12-21 PROCEDURE — 99213 OFFICE O/P EST LOW 20 MIN: CPT | Mod: ,,, | Performed by: FAMILY MEDICINE

## 2017-12-21 NOTE — PROGRESS NOTES
Subjective:       Patient ID: Saman Crockett is a 89 y.o. male.    Chief Complaint: Follow-up (discuss medications )      Sleeping a lot, wont get out of bed, misses am dosing of meds. Some sundowning at night his medication list was reviewed and there were no sedating antihistamines controlled substances or others that would contribute to morning sleepiness or nighttime awakenings    Cholesterol in January 2016 was a fairly well-controlled total cholesterol 202 triglycerides 133    Allergies and Medications:   Review of patient's allergies indicates:   Allergen Reactions    Levaquin [levofloxacin] Nausea Only     Current Outpatient Prescriptions   Medication Sig Dispense Refill    aspirin (ECOTRIN) 81 MG EC tablet Take 81 mg by mouth once daily.      blood sugar diagnostic Strp Test blood sugar once a day 30 strip 11    blood-glucose meter kit Test blood sugar once a day 1 each 0    escitalopram oxalate (LEXAPRO) 10 MG tablet Take 1 tablet (10 mg total) by mouth once daily. 30 tablet 1    ferrous gluconate (FERGON) 324 MG tablet Take 1 tablet (324 mg total) by mouth daily with breakfast. 30 tablet 1    gemfibrozil (LOPID) 600 MG tablet TAKE 1 TABLET BY MOUTH  TWICE A  tablet 3    glipiZIDE (GLUCOTROL) 5 MG tablet       ipratropium (ATROVENT) 0.06 % nasal spray       levothyroxine (SYNTHROID) 150 MCG tablet TAKE ONE TABLET BY MOUTH daily 30 tablet 11    megestrol (MEGACE ES) 625 mg/5 mL Susp Take 5 mLs (625 mg total) by mouth once daily. 150 mL 11    memantine (NAMENDA) 10 MG Tab       metFORMIN (GLUCOPHAGE-XR) 750 MG 24 hr tablet TAKE TWO TABLETS BY MOUTH daily 60 tablet 11    multivitamin (THERAGRAN) tablet Take 1 tablet by mouth once daily. 30 tablet 1    tamsulosin (FLOMAX) 0.4 mg Cp24 Take 1 capsule (0.4 mg total) by mouth once daily. 90 capsule 0    enalapril (VASOTEC) 2.5 MG tablet Take 1 tablet (2.5 mg total) by mouth once daily. 30 tablet 1     No current facility-administered  medications for this visit.        Family History:   Family History   Problem Relation Age of Onset    Heart disease Father        Social History:   Social History     Social History    Marital status:      Spouse name: N/A    Number of children: N/A    Years of education: N/A     Occupational History    Not on file.     Social History Main Topics    Smoking status: Former Smoker     Types: Cigarettes     Quit date: 8/14/1973    Smokeless tobacco: Never Used    Alcohol use No    Drug use: No    Sexual activity: Not on file     Other Topics Concern    Not on file     Social History Narrative    No narrative on file       Review of Systems    Objective:     Vitals:    12/21/17 1022   BP: 130/70   Pulse: 68   Resp: 16   Temp: 98.9 °F (37.2 °C)        Physical Exam    Assessment:       1. Acquired hypothyroidism    2. Memory difficulties    3. SunDown syndrome    4. Mixed hyperlipidemia    5. Late onset Alzheimer's disease with behavioral disturbance        Plan:       Saman was seen today for follow-up.    Diagnoses and all orders for this visit:    Acquired hypothyroidism  -     TSH; Future  -     T4; Future  -     TSH  -     T4    Memory difficulties  -     Ambulatory referral to Hospice    SunDown syndrome  -     Ambulatory referral to Hospice    Mixed hyperlipidemia  -     Lipid panel; Future  -     Ambulatory referral to Hospice  -     Lipid panel    Late onset Alzheimer's disease with behavioral disturbance  -     Ambulatory referral to Hospice         Return in about 3 months (around 3/21/2018).

## 2017-12-26 LAB
T4 FREE SP9 P CHAL SERPL-SCNC: 0.98 NG/DL (ref 0.45–1.27)
TSH SERPL DL<=0.005 MIU/L-ACNC: 0.05 ULU/ML (ref 0.3–5.6)

## 2018-01-02 ENCOUNTER — TELEPHONE (OUTPATIENT)
Dept: FAMILY MEDICINE | Facility: CLINIC | Age: 83
End: 2018-01-02

## 2018-01-02 DIAGNOSIS — J06.9 UPPER RESPIRATORY TRACT INFECTION, UNSPECIFIED TYPE: Primary | ICD-10-CM

## 2018-01-02 RX ORDER — PROMETHAZINE HYDROCHLORIDE AND DEXTROMETHORPHAN HYDROBROMIDE 6.25; 15 MG/5ML; MG/5ML
5 SYRUP ORAL 4 TIMES DAILY
Qty: 180 ML | Refills: 2 | Status: SHIPPED | OUTPATIENT
Start: 2018-01-02 | End: 2018-01-12

## 2018-01-02 NOTE — TELEPHONE ENCOUNTER
----- Message from Cherri Rollins sent at 1/2/2018  9:39 AM CST -----  Contact: Laura, a family member  Patient has appt tomorrow - he is coughing  A lot, & his spouse is in hospital with  Pneumonia.  Laura wanted me to ask if Dr VENTURA would call in some cough medication.

## 2018-01-03 ENCOUNTER — OFFICE VISIT (OUTPATIENT)
Dept: FAMILY MEDICINE | Facility: CLINIC | Age: 83
End: 2018-01-03
Payer: MEDICARE

## 2018-01-03 VITALS
SYSTOLIC BLOOD PRESSURE: 140 MMHG | BODY MASS INDEX: 22.38 KG/M2 | RESPIRATION RATE: 20 BRPM | HEART RATE: 80 BPM | TEMPERATURE: 99 F | OXYGEN SATURATION: 98 % | WEIGHT: 165 LBS | DIASTOLIC BLOOD PRESSURE: 62 MMHG

## 2018-01-03 DIAGNOSIS — J18.9 PNEUMONIA DUE TO INFECTIOUS ORGANISM, UNSPECIFIED LATERALITY, UNSPECIFIED PART OF LUNG: ICD-10-CM

## 2018-01-03 DIAGNOSIS — J18.9 PNEUMONIA DUE TO INFECTIOUS ORGANISM, UNSPECIFIED LATERALITY, UNSPECIFIED PART OF LUNG: Primary | ICD-10-CM

## 2018-01-03 DIAGNOSIS — E11.8 TYPE 2 DIABETES MELLITUS WITH COMPLICATION, WITHOUT LONG-TERM CURRENT USE OF INSULIN: ICD-10-CM

## 2018-01-03 PROCEDURE — 99214 OFFICE O/P EST MOD 30 MIN: CPT | Mod: 25,,, | Performed by: FAMILY MEDICINE

## 2018-01-03 PROCEDURE — 96372 THER/PROPH/DIAG INJ SC/IM: CPT | Mod: ,,, | Performed by: FAMILY MEDICINE

## 2018-01-03 RX ORDER — BETAMETHASONE SODIUM PHOSPHATE AND BETAMETHASONE ACETATE 3; 3 MG/ML; MG/ML
12 INJECTION, SUSPENSION INTRA-ARTICULAR; INTRALESIONAL; INTRAMUSCULAR; SOFT TISSUE
Status: COMPLETED | OUTPATIENT
Start: 2018-01-03 | End: 2018-01-03

## 2018-01-03 RX ORDER — AMOXICILLIN AND CLAVULANATE POTASSIUM 500; 125 MG/1; MG/1
1 TABLET, FILM COATED ORAL 3 TIMES DAILY
Qty: 30 TABLET | Refills: 0 | Status: SHIPPED | OUTPATIENT
Start: 2018-01-03 | End: 2018-01-13

## 2018-01-03 RX ORDER — CEFTRIAXONE 500 MG/1
500 INJECTION, POWDER, FOR SOLUTION INTRAMUSCULAR; INTRAVENOUS
Status: COMPLETED | OUTPATIENT
Start: 2018-01-03 | End: 2018-01-03

## 2018-01-03 RX ADMIN — CEFTRIAXONE 500 MG: 500 INJECTION, POWDER, FOR SOLUTION INTRAMUSCULAR; INTRAVENOUS at 03:01

## 2018-01-03 RX ADMIN — BETAMETHASONE SODIUM PHOSPHATE AND BETAMETHASONE ACETATE 12 MG: 3; 3 INJECTION, SUSPENSION INTRA-ARTICULAR; INTRALESIONAL; INTRAMUSCULAR; SOFT TISSUE at 03:01

## 2018-01-03 NOTE — PROGRESS NOTES
Subjective:       Patient ID: Saman Crockett is a 89 y.o. male.    Chief Complaint: Cough and URI (BS (NOT FASTING))      Patient has a 5 day history of cough and fatigue weakness and pallor his wife is got out of the hospital with pneumonia.  Also hearing has gone rapidly worse since the onset of the symptoms.      Cough   This is a new problem. The current episode started in the past 7 days (5 dd). The problem has been resolved. The problem occurs every few minutes. He has tried OTC cough suppressant for the symptoms. There is no history of asthma, COPD, emphysema or pneumonia.       Allergies and Medications:   Review of patient's allergies indicates:   Allergen Reactions    Levaquin [levofloxacin] Nausea Only     Current Outpatient Prescriptions   Medication Sig Dispense Refill    aspirin (ECOTRIN) 81 MG EC tablet Take 81 mg by mouth once daily.      blood sugar diagnostic Strp Test blood sugar once a day 30 strip 11    blood-glucose meter kit Test blood sugar once a day 1 each 0    enalapril (VASOTEC) 2.5 MG tablet Take 1 tablet (2.5 mg total) by mouth once daily. 30 tablet 1    escitalopram oxalate (LEXAPRO) 10 MG tablet Take 1 tablet (10 mg total) by mouth once daily. 30 tablet 1    ferrous gluconate (FERGON) 324 MG tablet Take 1 tablet (324 mg total) by mouth daily with breakfast. 30 tablet 1    gemfibrozil (LOPID) 600 MG tablet TAKE 1 TABLET BY MOUTH  TWICE A  tablet 3    glipiZIDE (GLUCOTROL) 5 MG tablet       ipratropium (ATROVENT) 0.06 % nasal spray       levothyroxine (SYNTHROID) 150 MCG tablet TAKE ONE TABLET BY MOUTH daily 30 tablet 11    megestrol (MEGACE ES) 625 mg/5 mL Susp Take 5 mLs (625 mg total) by mouth once daily. 150 mL 11    memantine (NAMENDA) 10 MG Tab       metFORMIN (GLUCOPHAGE-XR) 750 MG 24 hr tablet TAKE TWO TABLETS BY MOUTH daily 60 tablet 11    multivitamin (THERAGRAN) tablet Take 1 tablet by mouth once daily. 30 tablet 1     promethazine-dextromethorphan (PROMETHAZINE-DM) 6.25-15 mg/5 mL Syrp Take 5 mLs by mouth 4 (four) times daily. 180 mL 2    tamsulosin (FLOMAX) 0.4 mg Cp24 Take 1 capsule (0.4 mg total) by mouth once daily. 90 capsule 0    amoxicillin-clavulanate 500-125mg (AUGMENTIN) 500-125 mg Tab Take 1 tablet (500 mg total) by mouth 3 (three) times daily. 30 tablet 0     Current Facility-Administered Medications   Medication Dose Route Frequency Provider Last Rate Last Dose    betamethasone acetate-betamethasone sodium phosphate injection 12 mg  12 mg Intramuscular 1 time in Clinic/HOD Rajan Munson MD        cefTRIAXone injection 500 mg  500 mg Intramuscular 1 time in Clinic/HOD Rajan Munson MD           Family History:   Family History   Problem Relation Age of Onset    Heart disease Father        Social History:   Social History     Social History    Marital status:      Spouse name: N/A    Number of children: N/A    Years of education: N/A     Occupational History    Not on file.     Social History Main Topics    Smoking status: Former Smoker     Types: Cigarettes     Quit date: 8/14/1973    Smokeless tobacco: Never Used    Alcohol use No    Drug use: No    Sexual activity: Not on file     Other Topics Concern    Not on file     Social History Narrative    No narrative on file       Review of Systems    Objective:     Vitals:    01/03/18 1425   BP: (!) 140/62   Pulse: 80   Resp: 20   Temp: 98.9 °F (37.2 °C)        Physical Exam   Constitutional: He appears well-developed and well-nourished. No distress.   HENT:   Head: Normocephalic and atraumatic.   Right Ear: Hearing, tympanic membrane, external ear and ear canal normal. No drainage, swelling or tenderness. No foreign bodies. Tympanic membrane is not injected, not scarred, not perforated, not erythematous, not retracted and not bulging. Tympanic membrane mobility is normal. No middle ear effusion. No hemotympanum. No decreased hearing is noted.    Left Ear: Hearing, tympanic membrane, external ear and ear canal normal. No drainage, swelling or tenderness. No foreign bodies. Tympanic membrane is not injected, not scarred, not perforated, not erythematous, not retracted and not bulging. Tympanic membrane mobility is normal.  No middle ear effusion. No hemotympanum. No decreased hearing is noted.   Nose: Nose normal. No mucosal edema, rhinorrhea, nose lacerations, sinus tenderness, nasal deformity or septal deviation. Right sinus exhibits no maxillary sinus tenderness and no frontal sinus tenderness. Left sinus exhibits no maxillary sinus tenderness and no frontal sinus tenderness.   Mouth/Throat: Uvula is midline and oropharynx is clear and moist. Normal dentition. No oropharyngeal exudate, posterior oropharyngeal edema, posterior oropharyngeal erythema or tonsillar abscesses.   Eyes: Conjunctivae and EOM are normal. Pupils are equal, round, and reactive to light. Right eye exhibits no discharge. Left eye exhibits no discharge. No scleral icterus.   Neck: Normal range of motion. Neck supple. No thyromegaly present.   Cardiovascular: Normal rate, regular rhythm, normal heart sounds and intact distal pulses.  Exam reveals no gallop and no friction rub.    No murmur heard.  Pulmonary/Chest: Effort normal and breath sounds normal. No accessory muscle usage or stridor. Tachypnea noted. No apnea and no bradypnea. No respiratory distress. He has no decreased breath sounds. He has no wheezes. He has no rales.         He exhibits no tenderness.   Lymphadenopathy:     He has no cervical adenopathy.   Skin: He is not diaphoretic.   Nursing note and vitals reviewed.      Assessment:       1. Pneumonia due to infectious organism, unspecified laterality, unspecified part of lung        Plan:       Saman was seen today for cough and uri.    Diagnoses and all orders for this visit:    Pneumonia due to infectious organism, unspecified laterality, unspecified part of lung  -      X-Ray Chest PA And Lateral; Future    Other orders  -     amoxicillin-clavulanate 500-125mg (AUGMENTIN) 500-125 mg Tab; Take 1 tablet (500 mg total) by mouth 3 (three) times daily.  -     betamethasone acetate-betamethasone sodium phosphate injection 12 mg; Inject 2 mLs (12 mg total) into the muscle one time.  -     cefTRIAXone injection 500 mg; Inject 0.5 g (500 mg total) into the muscle one time.         Return in about 1 week (around 1/10/2018), or if symptoms worsen or fail to improve.

## 2018-01-04 ENCOUNTER — TELEPHONE (OUTPATIENT)
Dept: FAMILY MEDICINE | Facility: CLINIC | Age: 83
End: 2018-01-04

## 2018-01-05 ENCOUNTER — TELEPHONE (OUTPATIENT)
Dept: FAMILY MEDICINE | Facility: CLINIC | Age: 83
End: 2018-01-05

## 2018-01-08 ENCOUNTER — TELEPHONE (OUTPATIENT)
Dept: FAMILY MEDICINE | Facility: CLINIC | Age: 83
End: 2018-01-08

## 2018-01-08 NOTE — TELEPHONE ENCOUNTER
Baiely with [facility] called in regards to patient she would like to discuss patient's blood sugar levels with you. Blood sugar 01/07/18 not fasting 411  Fasting today 322 patient taking glyburide, metformin and lantus 10 mg in a.m. Patient is alert and talking no problems.  Advised if patient should be lethargic or change in mental status call [phone].   She would like to discuss increasing the Lantus . Please call Bailey at [phone]

## 2018-01-09 NOTE — TELEPHONE ENCOUNTER
Case discussed with Bailey at [facility]:Above blood sugars are noted. Sugars are improved but still way above desirable. We will increase his Lantus to 20 units every morning and follow his blood sugars for 3 days and report back to me with his sugar diary after 3 days.

## 2018-01-10 ENCOUNTER — OFFICE VISIT (OUTPATIENT)
Dept: FAMILY MEDICINE | Facility: CLINIC | Age: 83
End: 2018-01-10
Payer: MEDICARE

## 2018-01-10 VITALS
HEART RATE: 64 BPM | SYSTOLIC BLOOD PRESSURE: 112 MMHG | RESPIRATION RATE: 16 BRPM | DIASTOLIC BLOOD PRESSURE: 60 MMHG | BODY MASS INDEX: 23.19 KG/M2 | TEMPERATURE: 98 F | WEIGHT: 171 LBS

## 2018-01-10 DIAGNOSIS — F05 SUNDOWN SYNDROME: ICD-10-CM

## 2018-01-10 DIAGNOSIS — J18.9 PNEUMONIA DUE TO INFECTIOUS ORGANISM, UNSPECIFIED LATERALITY, UNSPECIFIED PART OF LUNG: Primary | ICD-10-CM

## 2018-01-10 DIAGNOSIS — H91.13 PRESBYCUSIS OF BOTH EARS: ICD-10-CM

## 2018-01-10 DIAGNOSIS — F02.80 LATE ONSET ALZHEIMER'S DISEASE WITHOUT BEHAVIORAL DISTURBANCE: ICD-10-CM

## 2018-01-10 DIAGNOSIS — E11.8 TYPE 2 DIABETES MELLITUS WITH COMPLICATION, WITHOUT LONG-TERM CURRENT USE OF INSULIN: ICD-10-CM

## 2018-01-10 DIAGNOSIS — G30.1 LATE ONSET ALZHEIMER'S DISEASE WITHOUT BEHAVIORAL DISTURBANCE: ICD-10-CM

## 2018-01-10 PROCEDURE — 99214 OFFICE O/P EST MOD 30 MIN: CPT | Mod: ,,, | Performed by: FAMILY MEDICINE

## 2018-01-10 RX ORDER — QUETIAPINE FUMARATE 25 MG/1
25 TABLET, FILM COATED ORAL NIGHTLY
Qty: 30 TABLET | Refills: 11 | Status: SHIPPED | OUTPATIENT
Start: 2018-01-10 | End: 2019-01-10

## 2018-01-10 NOTE — PROGRESS NOTES
Subjective:       Patient ID: Saman Crockett is a 89 y.o. male.    Chief Complaint: Pneumonia; Blood Sugar Problem; and Sores (on both feet)      There is a follow-up for treatment of pneumonia he was given Rocephin and a course of antibiotics and a steroid. We went up on his insulin because of high sugars in the 4 and 500s on up from 10-20 units of insulin and his sugars have dropped from 500 down into the 2 and 300s on 20 units over the last 2 days.        Pneumonia     Sores         Allergies and Medications:   Review of patient's allergies indicates:   Allergen Reactions    Levaquin [levofloxacin] Nausea Only     Current Outpatient Prescriptions   Medication Sig Dispense Refill    amoxicillin-clavulanate 500-125mg (AUGMENTIN) 500-125 mg Tab Take 1 tablet (500 mg total) by mouth 3 (three) times daily. 30 tablet 0    aspirin (ECOTRIN) 81 MG EC tablet Take 81 mg by mouth once daily.      blood sugar diagnostic Strp Test blood sugar once a day 30 strip 11    blood-glucose meter kit Test blood sugar once a day 1 each 0    enalapril (VASOTEC) 2.5 MG tablet Take 1 tablet (2.5 mg total) by mouth once daily. 30 tablet 1    escitalopram oxalate (LEXAPRO) 10 MG tablet Take 1 tablet (10 mg total) by mouth once daily. 30 tablet 1    glipiZIDE (GLUCOTROL) 5 MG tablet       ipratropium (ATROVENT) 0.06 % nasal spray       levothyroxine (SYNTHROID) 150 MCG tablet TAKE ONE TABLET BY MOUTH daily 30 tablet 11    megestrol (MEGACE ES) 625 mg/5 mL Susp Take 5 mLs (625 mg total) by mouth once daily. 150 mL 11    metFORMIN (GLUCOPHAGE-XR) 750 MG 24 hr tablet TAKE TWO TABLETS BY MOUTH daily 60 tablet 11    multivitamin (THERAGRAN) tablet Take 1 tablet by mouth once daily. 30 tablet 1    promethazine-dextromethorphan (PROMETHAZINE-DM) 6.25-15 mg/5 mL Syrp Take 5 mLs by mouth 4 (four) times daily. 180 mL 2    tamsulosin (FLOMAX) 0.4 mg Cp24 Take 1 capsule (0.4 mg total) by mouth once daily. 90 capsule 0    QUEtiapine  (SEROQUEL) 25 MG Tab Take 1 tablet (25 mg total) by mouth every evening. 30 tablet 11     No current facility-administered medications for this visit.        Family History:   Family History   Problem Relation Age of Onset    Heart disease Father        Social History:   Social History     Social History    Marital status:      Spouse name: N/A    Number of children: N/A    Years of education: N/A     Occupational History    Not on file.     Social History Main Topics    Smoking status: Former Smoker     Types: Cigarettes     Quit date: 8/14/1973    Smokeless tobacco: Never Used    Alcohol use No    Drug use: No    Sexual activity: Not on file     Other Topics Concern    Not on file     Social History Narrative    No narrative on file       Review of Systems   Psychiatric/Behavioral: Positive for sleep disturbance (Patient is having sleep disturbance his room house at night because of his ementia and will often sleep late into the morning).       Objective:     Vitals:    01/10/18 1602   BP: 112/60   Pulse: 64   Resp: 16   Temp: 98.4 °F (36.9 °C)        Physical Exam   Constitutional: He appears well-developed and well-nourished. No distress.   HENT:   Head: Normocephalic and atraumatic.   Right Ear: Hearing, tympanic membrane, external ear and ear canal normal. No drainage, swelling or tenderness. No foreign bodies. Tympanic membrane is not injected, not scarred, not perforated, not erythematous, not retracted and not bulging. Tympanic membrane mobility is normal. No middle ear effusion. No hemotympanum. No decreased hearing is noted.   Left Ear: Hearing, tympanic membrane, external ear and ear canal normal. No drainage, swelling or tenderness. No foreign bodies. Tympanic membrane is not injected, not scarred, not perforated, not erythematous, not retracted and not bulging. Tympanic membrane mobility is normal.  No middle ear effusion. No hemotympanum. No decreased hearing is noted.   Nose: Nose  normal. No mucosal edema, rhinorrhea, nose lacerations, sinus tenderness, nasal deformity or septal deviation. Right sinus exhibits no maxillary sinus tenderness and no frontal sinus tenderness. Left sinus exhibits no maxillary sinus tenderness and no frontal sinus tenderness.   Mouth/Throat: Uvula is midline and oropharynx is clear and moist. Normal dentition. No oropharyngeal exudate, posterior oropharyngeal edema, posterior oropharyngeal erythema or tonsillar abscesses.   Eyes: Conjunctivae and EOM are normal. Pupils are equal, round, and reactive to light. Right eye exhibits no discharge. Left eye exhibits no discharge. No scleral icterus.   Neck: Normal range of motion. Neck supple. No thyromegaly present.   Cardiovascular: Normal rate, regular rhythm, normal heart sounds and intact distal pulses.  Exam reveals no gallop and no friction rub.    No murmur heard.  Pulmonary/Chest: Effort normal and breath sounds normal. No stridor. No respiratory distress. He has no wheezes. He has no rales. He exhibits no tenderness.   Lymphadenopathy:     He has no cervical adenopathy.   Skin: He is not diaphoretic.   Nursing note and vitals reviewed.      Assessment:       1. Pneumonia due to infectious organism, unspecified laterality, unspecified part of lung    2. Type 2 diabetes mellitus with complication, without long-term current use of insulin    3. SunDown syndrome    4. Late onset Alzheimer's disease without behavioral disturbance    5. Presbycusis of both ears        Plan:       Saman was seen today for pneumonia, blood sugar problem and sores.    Diagnoses and all orders for this visit:    Pneumonia due to infectious organism, unspecified laterality, unspecified part of lung    Type 2 diabetes mellitus with complication, without long-term current use of insulin  We will increase his insulin to 25 units per day  SunDown syndrome  -     QUEtiapine (SEROQUEL) 25 MG Tab; Take 1 tablet (25 mg total) by mouth every  evening.    Late onset Alzheimer's disease without behavioral disturbance  -     QUEtiapine (SEROQUEL) 25 MG Tab; Take 1 tablet (25 mg total) by mouth every evening.    Presbycusis of both ears  -     Ambulatory consult to ENT         Return in about 3 months (around 4/10/2018).

## 2018-01-11 ENCOUNTER — TELEPHONE (OUTPATIENT)
Dept: FAMILY MEDICINE | Facility: CLINIC | Age: 83
End: 2018-01-11

## 2018-01-11 DIAGNOSIS — E10.628 TYPE 1 DIABETES MELLITUS WITH PRESSURE CALLUS: Primary | ICD-10-CM

## 2018-01-11 DIAGNOSIS — L84 TYPE 1 DIABETES MELLITUS WITH PRESSURE CALLUS: Primary | ICD-10-CM

## 2018-01-12 PROBLEM — E10.628 TYPE 1 DIABETES MELLITUS WITH PRESSURE CALLUS: Status: ACTIVE | Noted: 2017-05-31

## 2018-01-12 PROBLEM — L84 TYPE 1 DIABETES MELLITUS WITH PRESSURE CALLUS: Status: ACTIVE | Noted: 2017-05-31

## 2018-01-15 ENCOUNTER — TELEPHONE (OUTPATIENT)
Dept: FAMILY MEDICINE | Facility: CLINIC | Age: 83
End: 2018-01-15

## 2018-01-15 DIAGNOSIS — E11.9 DIABETES MELLITUS WITHOUT COMPLICATION: ICD-10-CM

## 2018-01-15 NOTE — TELEPHONE ENCOUNTER
Nurse called regarding Blood sugar  Family increased lantus to 25 u as per your instructions blood sugar is down to 127 to 200.  They decreased back down to 20 u   Should they continue 20 u q day and use 25u units if over 250 only as needed please call   Bailey [phone]

## 2018-01-15 NOTE — TELEPHONE ENCOUNTER
----- Message from Cherri Rollins sent at 1/15/2018 10:45 AM CST -----  Roxanne/pt's daughter Came in, [phone], needs refill test stips acue check francisco plus -wants it to go to [facility] [phone], they have never had it filled there.

## 2018-01-18 ENCOUNTER — TELEPHONE (OUTPATIENT)
Dept: FAMILY MEDICINE | Facility: CLINIC | Age: 83
End: 2018-01-18

## 2018-01-18 NOTE — TELEPHONE ENCOUNTER
Blood sugars reviewed and discussed with Bailey Joya consistently over 200. Patient is eating better but gets up at night E as well and they can't stop it. We need to increase his Lantus to 30 units and continue to monitor make another change in 3 days if necessary.

## 2018-01-18 NOTE — TELEPHONE ENCOUNTER
Update on Mr. Crockett and his blood sugars  01/18/2018    278  01/17/2018    244  01/16/2018    275  Patient is eating during the night so these are not fasting   Patient taking 25 u lantus in am   Please call to discuss   Bailey [phone]

## 2018-01-19 DIAGNOSIS — E11.9 DIABETES MELLITUS WITHOUT COMPLICATION: ICD-10-CM

## 2018-01-19 NOTE — TELEPHONE ENCOUNTER
----- Message from Carola Pollard sent at 1/19/2018  9:30 AM CST -----  Contact: Daughter Bib Alexander   Needs ACU Check strips refilled

## 2018-02-20 DIAGNOSIS — E11.9 DIABETES MELLITUS WITHOUT COMPLICATION: Primary | ICD-10-CM

## 2018-02-21 RX ORDER — GLIPIZIDE 5 MG/1
5 TABLET ORAL
Qty: 30 TABLET | Refills: 6 | Status: SHIPPED | OUTPATIENT
Start: 2018-02-21 | End: 2018-02-27 | Stop reason: SDUPTHER

## 2018-02-27 ENCOUNTER — OFFICE VISIT (OUTPATIENT)
Dept: FAMILY MEDICINE | Facility: CLINIC | Age: 83
End: 2018-02-27
Payer: MEDICARE

## 2018-02-27 VITALS
RESPIRATION RATE: 20 BRPM | SYSTOLIC BLOOD PRESSURE: 100 MMHG | DIASTOLIC BLOOD PRESSURE: 60 MMHG | HEART RATE: 76 BPM | WEIGHT: 172 LBS | BODY MASS INDEX: 23.33 KG/M2

## 2018-02-27 DIAGNOSIS — E11.9 DIABETES MELLITUS WITHOUT COMPLICATION: ICD-10-CM

## 2018-02-27 DIAGNOSIS — F05 SUNDOWN SYNDROME: ICD-10-CM

## 2018-02-27 DIAGNOSIS — Z96.641 STATUS POST TOTAL REPLACEMENT OF RIGHT HIP: ICD-10-CM

## 2018-02-27 DIAGNOSIS — R41.3 MEMORY DIFFICULTIES: Primary | ICD-10-CM

## 2018-02-27 PROCEDURE — 99214 OFFICE O/P EST MOD 30 MIN: CPT | Mod: ,,, | Performed by: FAMILY MEDICINE

## 2018-02-27 PROCEDURE — 1159F MED LIST DOCD IN RCRD: CPT | Mod: ,,, | Performed by: FAMILY MEDICINE

## 2018-02-27 RX ORDER — GLIPIZIDE 5 MG/1
5 TABLET ORAL
Qty: 90 TABLET | Refills: 3 | Status: SHIPPED | OUTPATIENT
Start: 2018-02-27 | End: 2019-02-27

## 2018-02-27 NOTE — PROGRESS NOTES
Subjective:       Patient ID: Saman Crockett is a 89 y.o. male.    Chief Complaint: Back Pain; Follow-up (fill out paper work); and Medication Refill (glipizide #90 to [facility])      Patient is here to fill out paperwork for VA benefits he is essentially homebound at home health intermittently for acute on chronic problems.      Back Pain   This is a chronic problem. The current episode started more than 1 year ago. The problem occurs intermittently. The problem has been waxing and waning since onset.   Medication Refill         Allergies and Medications:   Review of patient's allergies indicates:   Allergen Reactions    Levaquin [levofloxacin] Nausea Only     Current Outpatient Prescriptions   Medication Sig Dispense Refill    aspirin (ECOTRIN) 81 MG EC tablet Take 81 mg by mouth once daily.      blood sugar diagnostic Strp ACCU CHECK AVIVIA PLUS TEST STRIPS   Test blood sugar once a day 30 strip 11    blood-glucose meter kit Test blood sugar once a day 1 each 0    enalapril (VASOTEC) 2.5 MG tablet Take 1 tablet (2.5 mg total) by mouth once daily. 30 tablet 1    escitalopram oxalate (LEXAPRO) 10 MG tablet Take 1 tablet (10 mg total) by mouth once daily. 30 tablet 1    glipiZIDE (GLUCOTROL) 5 MG tablet Take 1 tablet (5 mg total) by mouth daily with breakfast. 90 tablet 3    ipratropium (ATROVENT) 0.06 % nasal spray       levothyroxine (SYNTHROID) 150 MCG tablet TAKE ONE TABLET BY MOUTH daily 30 tablet 11    megestrol (MEGACE ES) 625 mg/5 mL Susp Take 5 mLs (625 mg total) by mouth once daily. 150 mL 11    metFORMIN (GLUCOPHAGE-XR) 750 MG 24 hr tablet TAKE TWO TABLETS BY MOUTH daily 60 tablet 11    multivitamin (THERAGRAN) tablet Take 1 tablet by mouth once daily. 30 tablet 1    QUEtiapine (SEROQUEL) 25 MG Tab Take 1 tablet (25 mg total) by mouth every evening. 30 tablet 11    tamsulosin (FLOMAX) 0.4 mg Cp24 Take 1 capsule (0.4 mg total) by mouth once daily. 90 capsule 0     No current  facility-administered medications for this visit.        Family History:   Family History   Problem Relation Age of Onset    Heart disease Father        Social History:   Social History     Social History    Marital status:      Spouse name: N/A    Number of children: N/A    Years of education: N/A     Occupational History    Not on file.     Social History Main Topics    Smoking status: Former Smoker     Types: Cigarettes     Quit date: 8/14/1973    Smokeless tobacco: Never Used    Alcohol use No    Drug use: No    Sexual activity: Not on file     Other Topics Concern    Not on file     Social History Narrative    No narrative on file       Review of Systems   Musculoskeletal: Positive for back pain.       Objective:     Vitals:    02/27/18 1051   BP: 100/60   Pulse: 76   Resp: 20        Physical Exam  forms were filled out for disability evaluation and VA benefits because of advanced disability patient is unable to ambulate even 5 feet with walker and full assistance only use for transfer. Patient is fully dependent and can only feed himself but does no personal hygiene or duties in the kitchen without full assist unable to do his own cooking.  Assessment:       1. Memory difficulties    2. Status post total replacement of right hip    3. SunDown syndrome    4. Diabetes mellitus without complication        Plan:       Saman was seen today for back pain, follow-up and medication refill.    Diagnoses and all orders for this visit:    Memory difficulties  -     Ambulatory referral to Home Health    Status post total replacement of right hip  -     Ambulatory referral to Home Health    SunDown syndrome  -     Ambulatory referral to Home Health    Diabetes mellitus without complication  -     glipiZIDE (GLUCOTROL) 5 MG tablet; Take 1 tablet (5 mg total) by mouth daily with breakfast.  Forms completed for VA disability benefits.       Follow-up in about 3 months (around 5/27/2018), or if symptoms  worsen or fail to improve.

## 2018-05-23 RX ORDER — ESCITALOPRAM OXALATE 10 MG/1
TABLET ORAL
Qty: 30 TABLET | Refills: 11 | Status: SHIPPED | OUTPATIENT
Start: 2018-05-23 | End: 2019-04-23 | Stop reason: SDUPTHER

## 2018-06-05 DIAGNOSIS — R33.9 URINE RETENTION: ICD-10-CM

## 2018-06-06 RX ORDER — TAMSULOSIN HYDROCHLORIDE 0.4 MG/1
CAPSULE ORAL
Qty: 90 CAPSULE | Refills: 3 | Status: SHIPPED | OUTPATIENT
Start: 2018-06-06 | End: 2019-07-03 | Stop reason: SDUPTHER

## 2018-06-15 NOTE — TELEPHONE ENCOUNTER
Please call regarding Mr. Crockett  C/o upper lung wheezing cough is worse at night    Should he be doing nebulizer treatments and should he see Podiatrist for the callus/redness on both feet.  Blood sugars are improving   01/11/18 241   Please call and discuss patient [phone]  
Update from Bailey with are not home health:  Patient's blood sugars are improving last was 241 if not gone to 25 units because to steroids or working the way out of his system and they wanted to wait. I told her that if his sugar was over 200 this evening they need to increase 25 units per day. He is developing calluses on his feet will make a referral to  for evaluation of the same.  
1/week(s)

## 2018-06-18 DIAGNOSIS — E11.9 DIABETES MELLITUS WITHOUT COMPLICATION: ICD-10-CM

## 2018-06-18 NOTE — TELEPHONE ENCOUNTER
----- Message from Brianne Bonilla sent at 6/18/2018  2:00 PM CDT -----  Needs script accu check avia, plus 50  test strips, sent to [facility]

## 2018-07-05 ENCOUNTER — TELEPHONE (OUTPATIENT)
Dept: FAMILY MEDICINE | Facility: CLINIC | Age: 83
End: 2018-07-05

## 2018-07-05 PROBLEM — Z79.4 TYPE 2 DIABETES MELLITUS WITH COMPLICATION, WITH LONG-TERM CURRENT USE OF INSULIN: Status: ACTIVE | Noted: 2017-05-31

## 2018-07-05 PROBLEM — E11.8 TYPE 2 DIABETES MELLITUS WITH COMPLICATION, WITH LONG-TERM CURRENT USE OF INSULIN: Status: ACTIVE | Noted: 2017-05-31

## 2018-07-05 RX ORDER — INSULIN GLARGINE 100 [IU]/ML
INJECTION, SOLUTION SUBCUTANEOUS NIGHTLY
Qty: 2 SYRINGE | Refills: 11 | Status: CANCELLED | OUTPATIENT
Start: 2018-07-05 | End: 2019-07-05

## 2018-08-07 DIAGNOSIS — E11.9 DIABETES MELLITUS WITHOUT COMPLICATION: Primary | ICD-10-CM

## 2018-08-07 RX ORDER — INSULIN GLARGINE 100 [IU]/ML
30 INJECTION, SOLUTION SUBCUTANEOUS DAILY
Qty: 9 ML | Refills: 11 | Status: SHIPPED | OUTPATIENT
Start: 2018-08-07 | End: 2019-09-08 | Stop reason: SDUPTHER

## 2018-08-21 DIAGNOSIS — E11.9 DIABETES MELLITUS WITHOUT COMPLICATION: ICD-10-CM

## 2018-08-21 RX ORDER — GLIPIZIDE 5 MG/1
TABLET ORAL
Qty: 30 TABLET | Refills: 6 | Status: SHIPPED | OUTPATIENT
Start: 2018-08-21 | End: 2019-03-29 | Stop reason: SDUPTHER

## 2018-09-17 RX ORDER — LEVOTHYROXINE SODIUM 150 UG/1
TABLET ORAL
Qty: 30 TABLET | Refills: 11 | Status: SHIPPED | OUTPATIENT
Start: 2018-09-17 | End: 2019-09-21 | Stop reason: SDUPTHER

## 2018-10-18 ENCOUNTER — CLINICAL SUPPORT (OUTPATIENT)
Dept: FAMILY MEDICINE | Facility: CLINIC | Age: 83
End: 2018-10-18
Payer: MEDICARE

## 2018-10-18 DIAGNOSIS — Z23 IMMUNIZATION DUE: Primary | ICD-10-CM

## 2018-10-18 PROCEDURE — G0008 ADMIN INFLUENZA VIRUS VAC: HCPCS | Mod: ,,, | Performed by: FAMILY MEDICINE

## 2018-10-18 PROCEDURE — 90662 IIV NO PRSV INCREASED AG IM: CPT | Mod: ,,, | Performed by: FAMILY MEDICINE

## 2018-10-29 ENCOUNTER — OFFICE VISIT (OUTPATIENT)
Dept: ORTHOPEDICS | Facility: CLINIC | Age: 83
End: 2018-10-29
Payer: MEDICARE

## 2018-10-29 DIAGNOSIS — Z96.641 HISTORY OF RIGHT HIP REPLACEMENT: ICD-10-CM

## 2018-10-29 DIAGNOSIS — M70.61 TROCHANTERIC BURSITIS OF RIGHT HIP: Primary | ICD-10-CM

## 2018-10-29 PROCEDURE — 99213 OFFICE O/P EST LOW 20 MIN: CPT | Mod: 25,,, | Performed by: ORTHOPAEDIC SURGERY

## 2018-10-29 PROCEDURE — 73502 X-RAY EXAM HIP UNI 2-3 VIEWS: CPT | Mod: ,,, | Performed by: ORTHOPAEDIC SURGERY

## 2018-10-29 PROCEDURE — 20610 DRAIN/INJ JOINT/BURSA W/O US: CPT | Mod: RT,,, | Performed by: ORTHOPAEDIC SURGERY

## 2018-10-29 RX ORDER — METHYLPREDNISOLONE ACETATE 40 MG/ML
40 INJECTION, SUSPENSION INTRA-ARTICULAR; INTRALESIONAL; INTRAMUSCULAR; SOFT TISSUE
Status: DISCONTINUED | OUTPATIENT
Start: 2018-10-29 | End: 2018-10-29 | Stop reason: HOSPADM

## 2018-10-29 RX ADMIN — METHYLPREDNISOLONE ACETATE 40 MG: 40 INJECTION, SUSPENSION INTRA-ARTICULAR; INTRALESIONAL; INTRAMUSCULAR; SOFT TISSUE at 02:10

## 2018-10-29 NOTE — PROCEDURES
Tendon Sheath  Date/Time: 10/29/2018 2:45 PM  Performed by: Nick Pagan Jr., MD  Authorized by: Nick Pagan Jr., MD     Consent Done?:  Yes (Verbal)  Timeout: prior to procedure the correct patient, procedure, and site was verified    Timeout: prior to procedure the correct patient, procedure, and site was verified    Location: hip right.  Prep: patient was prepped and draped in usual sterile fashion    Ultrasonic guidance for needle placement?: No    Needle size:  25 G  Medications:  40 mg methylPREDNISolone acetate 40 mg/mL  Patient tolerance:  Patient tolerated the procedure well with no immediate complications

## 2018-10-29 NOTE — PROGRESS NOTES
"[facility]    Subjective:     Chief Complaint:   Chief Complaint   Patient presents with    Right Hip - Pain     Patient fell about 2 weeks. Since then having trouble walking       Past Medical History:   Diagnosis Date    Diabetes mellitus     Elevated PSA     Hyperlipidemia     Kidney stone     Memory difficulties     Thyroid disease        Past Surgical History:   Procedure Laterality Date    LITHOTRIPSY      tonisilectomy      TOTAL HIP ARTHROPLASTY Right 08/07/2017       Current Outpatient Medications   Medication Sig    aspirin (ECOTRIN) 81 MG EC tablet Take 81 mg by mouth once daily.    cyproheptadine (PERIACTIN) 4 mg tablet     escitalopram oxalate (LEXAPRO) 10 MG tablet TAKE ONE TABLET BY MOUTH EVERY DAY    glipiZIDE (GLUCOTROL) 5 MG tablet Take 1 tablet (5 mg total) by mouth daily with breakfast.    glipiZIDE (GLUCOTROL) 5 MG tablet TAKE ONE TABLET BY MOUTH daily WITH BREAKFAST    insulin glargine (LANTUS SOLOSTAR) 100 unit/mL (3 mL) InPn pen Inject 30 Units into the skin once daily.    levothyroxine (SYNTHROID) 150 MCG tablet TAKE ONE TABLET BY MOUTH daily    metFORMIN (GLUCOPHAGE-XR) 750 MG 24 hr tablet TAKE TWO TABLETS BY MOUTH daily    multivitamin (THERAGRAN) tablet Take 1 tablet by mouth once daily.    pen needle, diabetic 29 gauge x 3/8" Ndle 1 Device by Misc.(Non-Drug; Combo Route) route every evening.    QUEtiapine (SEROQUEL) 25 MG Tab Take 1 tablet (25 mg total) by mouth every evening.    tamsulosin (FLOMAX) 0.4 mg Cp24 TAKE ONE CAPSULE BY MOUTH EVERY NIGHT AT BEDTIME    enalapril (VASOTEC) 2.5 MG tablet Take 1 tablet (2.5 mg total) by mouth once daily.    ipratropium (ATROVENT) 0.06 % nasal spray     megestrol (MEGACE ES) 625 mg/5 mL Susp Take 5 mLs (625 mg total) by mouth once daily.     No current facility-administered medications for this visit.        Review of patient's allergies indicates:   Allergen Reactions    Levaquin [levofloxacin] Nausea Only "       Family History   Problem Relation Age of Onset    Heart disease Father        Social History     Socioeconomic History    Marital status:      Spouse name: Not on file    Number of children: Not on file    Years of education: Not on file    Highest education level: Not on file   Social Needs    Financial resource strain: Not on file    Food insecurity - worry: Not on file    Food insecurity - inability: Not on file    Transportation needs - medical: Not on file    Transportation needs - non-medical: Not on file   Occupational History    Not on file   Tobacco Use    Smoking status: Former Smoker     Types: Cigarettes     Last attempt to quit: 1973     Years since quittin.2    Smokeless tobacco: Never Used   Substance and Sexual Activity    Alcohol use: No    Drug use: No    Sexual activity: Not on file   Other Topics Concern    Not on file   Social History Narrative    Not on file       History of present illness: 89-year-old male fell about 2 weeks ago and had some pain in the right hip. He was able to get up and walk didn't think too much about fracture but the pain is actually gotten a little bit worse in the right hip area over the last few days where he struggling to get up and walk.      Review of Systems:    Constitution: Negative for chills, fever, and sweats.  Negative for unexplained weight loss.    HENT:  Negative for headaches and blurry vision.    Cardiovascular:Negative for chest pain or irregular heart beat. Negative for hypertension.    Respiratory:  Negative for cough and shortness of breath.    Gastrointestinal: Negative for abdominal pain, heartburn, melena, nausea, and vomitting.    Genitourinary:  Negative bladder incontinence and dysuria.    Musculoskeletal:  See HPI for details.     Neurological: Negative for numbness.    Psychiatric/Behavioral: Negative for depression.  The patient is not nervous/anxious.      Endocrine: Negative for  polyuria    Hematologic/Lymphatic: Negative for bleeding problem.  Does not bruise/bleed easily.    Skin: Negative for poor would healing and rash    Objective:      Physical Examination:    Vital Signs:  There were no vitals filed for this visit.    There is no height or weight on file to calculate BMI.    This a well-developed, well nourished patient in no acute distress.  They are alert and oriented and cooperative to examination.        So physical exam shows that he can move the right hip similar to the other hip with only some pain at the trochanter. He does not have a screw shooting pain in the groin does not really have back pain. Has a total hip on the right of from a few years ago from a fracture.  Pertinent New Results:    XRAY Report / Interpretation:   So AP lateral oblique of the right hip shows total hip arthroplasty uncemented in good position. I don't see any acute changes of fracture around the femur or the acetabulum. There is some spurring at the trochanter laterally. There is no significant heterotopic ossification around the right total hip.Electronically Signed By Nick Pagan JR, MD    Assessment/Plan:      Some mild impression is I don't think he is fracture around the prosthesis on the right. He doesn't act like that and keep he can stand up on the right hip. I think more likely he has bursitis of the right hip and he does have a Spurling laterally that would make him more prone to that and treat him as a trochanteric bursitis. Treatment today 1 cc Depo-Medrol 5 cc lidocaine at the trochanter the right hip. Afterwards he had less pain move the hip. He could stand up on the right hip the whole time. We will see him back when necessary. If he does not make a good recovery then a CT scan may be indicated for the right hip. He has some dementia and he is relatively low demand patient.      This note was created using Dragon voice recognition software that occasionally misinterpreted phrases or  words.

## 2018-11-06 RX ORDER — METFORMIN HYDROCHLORIDE 750 MG/1
TABLET, EXTENDED RELEASE ORAL
Qty: 60 TABLET | Refills: 11 | Status: SHIPPED | OUTPATIENT
Start: 2018-11-06 | End: 2019-11-15 | Stop reason: SDUPTHER

## 2018-12-10 DIAGNOSIS — E11.9 DIABETES MELLITUS WITHOUT COMPLICATION: ICD-10-CM

## 2018-12-10 RX ORDER — LANCETS
EACH MISCELLANEOUS
Qty: 100 EACH | Refills: 3 | Status: SHIPPED | OUTPATIENT
Start: 2018-12-10

## 2019-02-04 DIAGNOSIS — E11.9 DIABETES MELLITUS WITHOUT COMPLICATION: ICD-10-CM

## 2019-03-29 ENCOUNTER — TELEPHONE (OUTPATIENT)
Dept: FAMILY MEDICINE | Facility: CLINIC | Age: 84
End: 2019-03-29

## 2019-03-29 DIAGNOSIS — E11.9 DIABETES MELLITUS WITHOUT COMPLICATION: ICD-10-CM

## 2019-03-29 RX ORDER — GLIPIZIDE 5 MG/1
TABLET ORAL
Qty: 30 TABLET | Refills: 6 | Status: SHIPPED | OUTPATIENT
Start: 2019-03-29 | End: 2019-05-20 | Stop reason: SDUPTHER

## 2019-03-29 NOTE — TELEPHONE ENCOUNTER
Spoke with the grand daughter Laura.  Wanted to schedule F/U appointment.  Last OV 2/18.  She reports the patient is on hospice.  Will consult hospice to see what MD is monitoring the patient and will refill medications.  Authorized 30 day supply.

## 2019-04-10 ENCOUNTER — TELEPHONE (OUTPATIENT)
Dept: FAMILY MEDICINE | Facility: CLINIC | Age: 84
End: 2019-04-10

## 2019-04-10 NOTE — TELEPHONE ENCOUNTER
Per Laura (Granddaughter) pt is on hospice services and will not be returning to the clinic for appts.

## 2019-04-23 RX ORDER — ESCITALOPRAM OXALATE 10 MG/1
TABLET ORAL
Qty: 30 TABLET | Refills: 11 | Status: SHIPPED | OUTPATIENT
Start: 2019-04-23

## 2019-05-20 DIAGNOSIS — E11.9 DIABETES MELLITUS WITHOUT COMPLICATION: ICD-10-CM

## 2019-05-20 RX ORDER — GLIPIZIDE 5 MG/1
TABLET ORAL
Qty: 30 TABLET | Refills: 6 | Status: SHIPPED | OUTPATIENT
Start: 2019-05-20

## 2019-07-03 DIAGNOSIS — R33.9 URINE RETENTION: ICD-10-CM

## 2019-07-03 RX ORDER — TAMSULOSIN HYDROCHLORIDE 0.4 MG/1
CAPSULE ORAL
Qty: 90 CAPSULE | Refills: 3 | Status: SHIPPED | OUTPATIENT
Start: 2019-07-03 | End: 2020-07-02

## 2019-09-08 DIAGNOSIS — E11.9 DIABETES MELLITUS WITHOUT COMPLICATION: ICD-10-CM

## 2019-09-09 RX ORDER — INSULIN GLARGINE 100 [IU]/ML
30 INJECTION, SOLUTION SUBCUTANEOUS NIGHTLY
Qty: 9 ML | Refills: 0 | Status: SHIPPED | OUTPATIENT
Start: 2019-09-09 | End: 2020-01-13 | Stop reason: SDUPTHER

## 2019-09-23 RX ORDER — LEVOTHYROXINE SODIUM 150 UG/1
TABLET ORAL
Qty: 30 TABLET | Refills: 0 | Status: SHIPPED | OUTPATIENT
Start: 2019-09-23

## 2019-10-14 DIAGNOSIS — E11.9 DIABETES MELLITUS WITHOUT COMPLICATION: ICD-10-CM

## 2019-10-14 RX ORDER — INSULIN GLARGINE 100 [IU]/ML
INJECTION, SOLUTION SUBCUTANEOUS
Qty: 1 BOX | Refills: 11 | Status: SHIPPED | OUTPATIENT
Start: 2019-10-14 | End: 2020-04-11

## 2019-10-22 RX ORDER — PEN NEEDLE, DIABETIC 29 G X1/2"
NEEDLE, DISPOSABLE MISCELLANEOUS
Qty: 200 EACH | Refills: 3 | Status: SHIPPED | OUTPATIENT
Start: 2019-10-22

## 2019-10-22 RX ORDER — LEVOTHYROXINE SODIUM 150 UG/1
TABLET ORAL
Qty: 30 TABLET | Refills: 0 | OUTPATIENT
Start: 2019-10-22

## 2019-11-15 RX ORDER — METFORMIN HYDROCHLORIDE 750 MG/1
TABLET, EXTENDED RELEASE ORAL
Qty: 60 TABLET | Refills: 11 | Status: SHIPPED | OUTPATIENT
Start: 2019-11-15 | End: 2020-11-14

## 2020-01-12 ENCOUNTER — HOSPITAL ENCOUNTER (EMERGENCY)
Facility: HOSPITAL | Age: 85
Discharge: HOME | End: 2020-01-12
Attending: EMERGENCY MEDICINE
Payer: MEDICARE

## 2020-01-12 VITALS
DIASTOLIC BLOOD PRESSURE: 65 MMHG | HEIGHT: 72 IN | TEMPERATURE: 98 F | HEART RATE: 76 BPM | BODY MASS INDEX: 25.06 KG/M2 | RESPIRATION RATE: 18 BRPM | SYSTOLIC BLOOD PRESSURE: 148 MMHG | OXYGEN SATURATION: 93 % | WEIGHT: 185 LBS
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
## 2020-01-12 DIAGNOSIS — S42.92XA SHOULDER FRACTURE, LEFT, CLOSED, INITIAL ENCOUNTER: ICD-10-CM
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
## 2020-01-12 DIAGNOSIS — S42.292A HUMERAL HEAD FRACTURE, LEFT, CLOSED, INITIAL ENCOUNTER: ICD-10-CM

## 2020-01-12 PROCEDURE — 25000003 PHARM REV CODE 250: Performed by: EMERGENCY MEDICINE

## 2020-01-12 PROCEDURE — 99283 EMERGENCY DEPT VISIT LOW MDM: CPT | Mod: 25

## 2020-01-12 RX ORDER — OXYCODONE AND ACETAMINOPHEN 5; 325 MG/1; MG/1
1 TABLET ORAL EVERY 6 HOURS PRN
Qty: 10 TABLET | Refills: 0 | Status: SHIPPED | OUTPATIENT
Start: 2020-01-12

## 2020-01-12 RX ORDER — ACETAMINOPHEN 325 MG/1
650 TABLET ORAL
Status: COMPLETED | OUTPATIENT
Start: 2020-01-12 | End: 2020-01-12

## 2020-01-12 RX ADMIN — ACETAMINOPHEN 650 MG: 325 TABLET ORAL at 10:01

## 2020-01-13 ENCOUNTER — OFFICE VISIT (OUTPATIENT)
Dept: ORTHOPEDICS | Facility: CLINIC | Age: 85
End: 2020-01-13
Payer: MEDICARE

## 2020-01-13 VITALS
BODY MASS INDEX: 25.06 KG/M2 | DIASTOLIC BLOOD PRESSURE: 52 MMHG | SYSTOLIC BLOOD PRESSURE: 97 MMHG | HEART RATE: 84 BPM | WEIGHT: 185 LBS | HEIGHT: 72 IN

## 2020-01-13 DIAGNOSIS — S42.352A CLOSED DISPLACED COMMINUTED FRACTURE OF SHAFT OF LEFT HUMERUS, INITIAL ENCOUNTER: Primary | ICD-10-CM

## 2020-01-13 DIAGNOSIS — R07.81 RIB PAIN ON LEFT SIDE: ICD-10-CM

## 2020-01-13 PROCEDURE — 1125F AMNT PAIN NOTED PAIN PRSNT: CPT | Mod: S$GLB,,, | Performed by: ORTHOPAEDIC SURGERY

## 2020-01-13 PROCEDURE — 23600 PR CLOSED RX PROX HUMERUS FRACTURE: ICD-10-PCS | Mod: GW,LT,S$GLB, | Performed by: ORTHOPAEDIC SURGERY

## 2020-01-13 PROCEDURE — 1159F MED LIST DOCD IN RCRD: CPT | Mod: S$GLB,,, | Performed by: ORTHOPAEDIC SURGERY

## 2020-01-13 PROCEDURE — 1159F PR MEDICATION LIST DOCUMENTED IN MEDICAL RECORD: ICD-10-PCS | Mod: S$GLB,,, | Performed by: ORTHOPAEDIC SURGERY

## 2020-01-13 PROCEDURE — 99214 OFFICE O/P EST MOD 30 MIN: CPT | Mod: 57,GW,25,S$GLB | Performed by: ORTHOPAEDIC SURGERY

## 2020-01-13 PROCEDURE — 99214 PR OFFICE/OUTPT VISIT, EST, LEVL IV, 30-39 MIN: ICD-10-PCS | Mod: 57,GW,25,S$GLB | Performed by: ORTHOPAEDIC SURGERY

## 2020-01-13 PROCEDURE — 1125F PR PAIN SEVERITY QUANTIFIED, PAIN PRESENT: ICD-10-PCS | Mod: S$GLB,,, | Performed by: ORTHOPAEDIC SURGERY

## 2020-01-13 PROCEDURE — 23600 CLTX PROX HUMRL FX W/O MNPJ: CPT | Mod: GW,LT,S$GLB, | Performed by: ORTHOPAEDIC SURGERY

## 2020-01-13 RX ORDER — HYDROCODONE BITARTRATE AND ACETAMINOPHEN 7.5; 325 MG/1; MG/1
1 TABLET ORAL EVERY 6 HOURS PRN
Qty: 28 TABLET | Refills: 0 | Status: SHIPPED | OUTPATIENT
Start: 2020-01-13

## 2020-01-13 NOTE — ED PROVIDER NOTES
Encounter Date: 2020       History     Chief Complaint   Patient presents with    Fall     Fell in the shower, injuring his left shoulder.      89-year-old male presented emergency department with left shoulder pain after fall.  Patient slipped in shower and fell and landed on his left shoulder.  Patient denies any other complaints.  Denies headache or head injury or loss of consciousness.  Denies chest pain or shortness of breath or any weakness or numbness.  Patient usually walks with a walker.  Patient complains of pain in the left shoulder area and does not have any other complaints        Review of patient's allergies indicates:   Allergen Reactions    Levaquin [levofloxacin] Nausea Only     Past Medical History:   Diagnosis Date    Diabetes mellitus     Elevated PSA     Hyperlipidemia     Kidney stone     Memory difficulties     Thyroid disease      Past Surgical History:   Procedure Laterality Date    LITHOTRIPSY      tonisilectomy      TOTAL HIP ARTHROPLASTY Right 2017     Family History   Problem Relation Age of Onset    Heart disease Father      Social History     Tobacco Use    Smoking status: Former Smoker     Types: Cigarettes     Last attempt to quit: 1973     Years since quittin.4    Smokeless tobacco: Never Used   Substance Use Topics    Alcohol use: No    Drug use: No     Review of Systems   Constitutional: Negative.    HENT: Negative.    Eyes: Negative.    Respiratory: Negative.    Cardiovascular: Negative.    Gastrointestinal: Negative.    Endocrine: Negative.    Genitourinary: Negative.    Musculoskeletal: Positive for arthralgias.        Left shoulder pain and injury and swelling   Skin: Negative.    Allergic/Immunologic: Negative.    Neurological: Negative.    Hematological: Negative.    Psychiatric/Behavioral: Negative.    All other systems reviewed and are negative.      Physical Exam     Initial Vitals [20 2106]   BP Pulse Resp Temp SpO2   (!)  172/77 74 18 97.9 °F (36.6 °C) 98 %      MAP       --         Physical Exam    Nursing note and vitals reviewed.  Constitutional: He appears well-developed and well-nourished.   HENT:   Head: Normocephalic and atraumatic.   Nose: Nose normal.   Eyes: Conjunctivae and EOM are normal.   Neck: Normal range of motion. No tracheal deviation present.   Cardiovascular: Normal rate, regular rhythm, normal heart sounds and intact distal pulses. Exam reveals no friction rub.    No murmur heard.  Pulmonary/Chest: Breath sounds normal. No respiratory distress. He has no wheezes. He has no rales.   Abdominal: Soft. He exhibits no distension. There is no tenderness.   Musculoskeletal: He exhibits tenderness. He exhibits no edema.   Left shoulder is tender to palpation with swelling and deformity of the left shoulder.  Extremities neurovascularly intact   Neurological: He is alert and oriented to person, place, and time. He has normal strength.   Mental status at baseline   Skin: Skin is warm and dry. Capillary refill takes less than 2 seconds.   Psychiatric: He has a normal mood and affect. Thought content normal.         ED Course   Procedures  Labs Reviewed - No data to display       Imaging Results          X-Ray Shoulder 2 or More Views Left (In process)  Result time 01/12/20 21:14:59   Procedure changed from X-ray Shoulder 2 or More Views Right               X-Rays:   Independently Interpreted Readings:   Other Readings:  Left shoulder x-ray shows comminuted humeral head fracture    Medical Decision Making:   Differential Diagnosis:   Patient presented emergency department after a fall with left shoulder pain and swelling and has comminuted left humeral head fracture.  Left upper extremities neurovascularly intact.  Sling placed and pain managed and patient feeling better so discharged with instructions and follow up with Orthopedics.  Extremities neurovascularly intact after sling placement  Clinical Tests:   Radiological  Study: Reviewed                   ED Course as of Jan 12 2237   Sun Jan 12, 2020 2155 Humeral head fracture    [BF]      ED Course User Index  [BF] JAMIA Forte                Clinical Impression:       ICD-10-CM ICD-9-CM   1. Fall, initial encounter W19.XXXA E888.9   2. Fall in bathtub, initial encounter W18.2XXA E885.9   3. Shoulder fracture, left, closed, initial encounter S42.92XA 812.00   4. Humeral head fracture, left, closed, initial encounter S42.292A 812.09                             Reid Schaeffer MD  01/12/20 0083

## 2020-01-13 NOTE — PROGRESS NOTES
"[facility]    Subjective:     Chief Complaint:   Chief Complaint   Patient presents with    Left Shoulder - Pain     Left shoulder / humerus pain x 01.12.2020. States that he fell in the shower and went to the ER and was dx with a humerus fx.        Past Medical History:   Diagnosis Date    Diabetes mellitus     Elevated PSA     Hyperlipidemia     Kidney stone     Memory difficulties     Thyroid disease     TIA (transient ischemic attack)        Past Surgical History:   Procedure Laterality Date    CARDIAC SURGERY      LITHOTRIPSY      tonisilectomy      TOTAL HIP ARTHROPLASTY Right 08/07/2017       Current Outpatient Medications   Medication Sig    ACCU-CHEK SOFTCLIX LANCETS Misc USE TO CHECK GLUCOSE ONCE DAILY    aspirin (ECOTRIN) 81 MG EC tablet Take 81 mg by mouth once daily.    blood sugar diagnostic Strp USE TO CHECK GLUCOSE ONCE DAILY    escitalopram oxalate (LEXAPRO) 10 MG tablet TAKE ONE TABLET BY MOUTH EVERY DAY    glipiZIDE (GLUCOTROL) 5 MG tablet TAKE ONE TABLET BY MOUTH daily WITH BREAKFAST    LANTUS SOLOSTAR U-100 INSULIN glargine 100 units/mL (3mL) SubQ pen INJECT 30 UNITS SUBCUTANEOUSLY ONCE DAILY    levothyroxine (SYNTHROID) 150 MCG tablet TAKE ONE TABLET BY MOUTH daily    metFORMIN (GLUCOPHAGE-XR) 750 MG 24 hr tablet TAKE TWO TABLETS BY MOUTH daily    multivitamin (THERAGRAN) tablet Take 1 tablet by mouth once daily.    pen needle, diabetic 31 gauge x 1/4" Ndle USE AS DIRECTED ONCE DAILY IN THE EVENING    tamsulosin (FLOMAX) 0.4 mg Cap TAKE ONE CAPSULE BY MOUTH EVERY NIGHT AT BEDTIME    cyproheptadine (PERIACTIN) 4 mg tablet     enalapril (VASOTEC) 2.5 MG tablet Take 1 tablet (2.5 mg total) by mouth once daily.    ipratropium (ATROVENT) 0.06 % nasal spray     megestrol (MEGACE ES) 625 mg/5 mL Susp Take 5 mLs (625 mg total) by mouth once daily.    oxyCODONE-acetaminophen (PERCOCET) 5-325 mg per tablet Take 1 tablet by mouth every 6 (six) hours as needed for " Pain. (Patient not taking: Reported on 2020)    QUEtiapine (SEROQUEL) 25 MG Tab Take 1 tablet (25 mg total) by mouth every evening.     No current facility-administered medications for this visit.        Review of patient's allergies indicates:   Allergen Reactions    Levaquin [levofloxacin] Nausea Only       Family History   Problem Relation Age of Onset    Heart disease Father        Social History     Socioeconomic History    Marital status:      Spouse name: Not on file    Number of children: Not on file    Years of education: Not on file    Highest education level: Not on file   Occupational History    Not on file   Social Needs    Financial resource strain: Not on file    Food insecurity:     Worry: Not on file     Inability: Not on file    Transportation needs:     Medical: Not on file     Non-medical: Not on file   Tobacco Use    Smoking status: Former Smoker     Types: Cigarettes     Last attempt to quit: 1973     Years since quittin.4    Smokeless tobacco: Never Used   Substance and Sexual Activity    Alcohol use: No    Drug use: No    Sexual activity: Not on file   Lifestyle    Physical activity:     Days per week: Not on file     Minutes per session: Not on file    Stress: Not on file   Relationships    Social connections:     Talks on phone: Not on file     Gets together: Not on file     Attends Restorationist service: Not on file     Active member of club or organization: Not on file     Attends meetings of clubs or organizations: Not on file     Relationship status: Not on file   Other Topics Concern    Not on file   Social History Narrative    Not on file       History of present illness:  He fell yesterday in bathtub injuring his back to some extent and his left shoulder.  He is pretty frail overall.  He is here with some vague back pain and left shoulder pain      Review of Systems:    Constitution: Negative for chills, fever, and sweats.  Negative for  unexplained weight loss.    HENT:  Negative for headaches and blurry vision.    Cardiovascular:Negative for chest pain or irregular heart beat. Negative for hypertension.    Respiratory:  Negative for cough and shortness of breath.    Gastrointestinal: Negative for abdominal pain, heartburn, melena, nausea, and vomitting.    Genitourinary:  Negative bladder incontinence and dysuria.    Musculoskeletal:  See HPI for details.     Neurological: Negative for numbness.    Psychiatric/Behavioral: Negative for depression.  The patient is not nervous/anxious.      Endocrine: Negative for polyuria    Hematologic/Lymphatic: Negative for bleeding problem.  Does not bruise/bleed easily.    Skin: Negative for poor would healing and rash    Objective:      Physical Examination:    Vital Signs:    Vitals:    01/13/20 1037   BP: (!) 97/52   Pulse: 84       Body mass index is 25.09 kg/m².    This a well-developed, well nourished patient in no acute distress.  They are alert and oriented and cooperative to examination.        So physical exam shows he is in a wheelchair.  He is alert.  He has pain in the proximal humerus on the left.  Mild swelling mild ecchymosis.  Lower arm looks fine.  Perhaps some tenderness in the left ribcage the midportion left.  There is no midline lumbar or thoracic pain.  No ecchymosis in the T-spine region. AP and lateral x-ray left shoulder that comes with him shows a 3 part fracture proximal humerus looks to be acute mild displacement.  Pertinent New Results:    XRAY Report / Interpretation:   AP P and oblique of the left ribcage shows perhaps 1 mid rib fracture on one view only that is not totally diagnostic.  No displaced rib fractures. Electronically Signed By Nick Pagan JR, MD    Assessment/Plan:      So my impression is he clearly has a 3 part fracture left shoulder.  Our plan is shoulder immobilizer. He may have a rib injury as well although I am not 100% convinced.  We have a shoulder  immobilizer that has a rib belt type device built into it so this right shoulder immobilizer would help both the rib if it is fractured or even bruise along with the shoulder so in each shoulder immobilizer Norco 7 for pain decreased activity of course he has wheelchair for the most part see him back 2 weeks.      This note was created using Dragon voice recognition software that occasionally misinterpreted phrases or words.

## 2020-01-13 NOTE — DISCHARGE INSTRUCTIONS
Take Tylenol for pain.  Rest, ice to the fracture site.  Return to emergency department for worsening symptoms or any problems

## 2020-12-04 NOTE — PATIENT CARE CONFERENCE
Weekly Staffing Report      Date Admitted: 8/9/2017 :   Staffing Date: 8/15/2017     Patient Active Problem List   Diagnosis    Elevated PSA    Hypothyroidism    Type 2 diabetes mellitus with diabetic polyneuropathy, without long-term current use of insulin    Eczema of both upper extremities    Insect bites and stings    Lumbar and sacral arthritis    S/P total hip arthroplasty          Team Members Present:  Physician Team Member: Dr Gutierrez  Nursing Team Member: Deven Sylvester RN  Case Management Team Member: Theresa You CM/SW  PT Team Member: Jeffrey Murphy PT  OT Team Member: Chica May OT  SLP Team Member: Huan PICHARDO    Nursing  Skin: Intact, incision stable   Bowel: Continent  Bladder:continent  Last BM: 8-14-17  Diet: diabetic, 2000 calorie  Appetite: good   Pain Level: 5/10    Physical Therapy  Supine to Sit:  minimal assist  Sit to Stand: minimal assist  Gait:  feet contact guard Rolling walker  Wheelchair Mobility: 200 feet standy by assistance  ROM: limited by ant hip precaution on right   Stairs:6 four inch steps CGA standy by assistance  Strength: right abd & add 2-/5, otherwise 3-/5, left 4 to 4+/5    Occupational Therapy  Feeding: set up  Grooming:supervision  UED: supervision  LED: minimal assist  Bathing:minimal assist   Toileting:standy by assistance  Toilet Transfer:  contact guard to SBA  Tub Transfer:  contact guard  Strength: bilateral 4 to 4+/5    Speech Therapy  Swallow: wfl  MMS: 17/30  Memory: maximal assist  Receptive Language: supervision  Expressive Language: supervision  Problem solving: minimal assist            Summary of Progress:  good    Barriers to Progress/Discharge: pain , hip precaution     Tolerates 3 hours of therapy: Yes    Comments: home with daughter     Estimated Length of Stay: 8-23-17                normal...